# Patient Record
Sex: FEMALE | Race: WHITE | Employment: OTHER | URBAN - METROPOLITAN AREA
[De-identification: names, ages, dates, MRNs, and addresses within clinical notes are randomized per-mention and may not be internally consistent; named-entity substitution may affect disease eponyms.]

---

## 2018-04-11 RX ORDER — LATANOPROST 50 UG/ML
1 SOLUTION/ DROPS OPHTHALMIC NIGHTLY
COMMUNITY
End: 2020-02-25 | Stop reason: CLARIF

## 2018-04-17 ENCOUNTER — APPOINTMENT (OUTPATIENT)
Dept: GENERAL RADIOLOGY | Age: 83
End: 2018-04-17
Attending: UROLOGY
Payer: MEDICARE

## 2018-04-17 ENCOUNTER — ANESTHESIA EVENT (OUTPATIENT)
Dept: OPERATING ROOM | Age: 83
End: 2018-04-17
Payer: MEDICARE

## 2018-04-17 ENCOUNTER — HOSPITAL ENCOUNTER (OUTPATIENT)
Age: 83
Setting detail: OUTPATIENT SURGERY
Discharge: HOME | End: 2018-04-17
Attending: UROLOGY | Admitting: UROLOGY
Payer: MEDICARE

## 2018-04-17 ENCOUNTER — ANESTHESIA (OUTPATIENT)
Dept: OPERATING ROOM | Age: 83
End: 2018-04-17
Payer: MEDICARE

## 2018-04-17 VITALS
TEMPERATURE: 98.1 F | SYSTOLIC BLOOD PRESSURE: 148 MMHG | HEIGHT: 62 IN | WEIGHT: 135 LBS | DIASTOLIC BLOOD PRESSURE: 70 MMHG | OXYGEN SATURATION: 98 % | HEART RATE: 60 BPM | RESPIRATION RATE: 17 BRPM | BODY MASS INDEX: 24.84 KG/M2

## 2018-04-17 VITALS
SYSTOLIC BLOOD PRESSURE: 127 MMHG | OXYGEN SATURATION: 98 % | DIASTOLIC BLOOD PRESSURE: 66 MMHG | RESPIRATION RATE: 17 BRPM

## 2018-04-17 DIAGNOSIS — C66.2 URETERAL CARCINOMA, LEFT (HCC): Primary | ICD-10-CM

## 2018-04-17 PROCEDURE — C2617 STENT, NON-COR, TEM W/O DEL: HCPCS | Performed by: UROLOGY

## 2018-04-17 PROCEDURE — 76000 FLUOROSCOPY <1 HR PHYS/QHP: CPT

## 2018-04-17 PROCEDURE — 6360000004 HC RX CONTRAST MEDICATION: Performed by: UROLOGY

## 2018-04-17 PROCEDURE — C1758 CATHETER, URETERAL: HCPCS | Performed by: UROLOGY

## 2018-04-17 PROCEDURE — 3700000000 HC ANESTHESIA ATTENDED CARE: Performed by: UROLOGY

## 2018-04-17 PROCEDURE — A9577 INJ MULTIHANCE: HCPCS | Performed by: UROLOGY

## 2018-04-17 PROCEDURE — 74420 UROGRAPHY RTRGR +-KUB: CPT

## 2018-04-17 PROCEDURE — 3600000013 HC SURGERY LEVEL 3 ADDTL 15MIN: Performed by: UROLOGY

## 2018-04-17 PROCEDURE — 6360000002 HC RX W HCPCS: Performed by: UROLOGY

## 2018-04-17 PROCEDURE — 3600000003 HC SURGERY LEVEL 3 BASE: Performed by: UROLOGY

## 2018-04-17 PROCEDURE — 3700000001 HC ADD 15 MINUTES (ANESTHESIA): Performed by: UROLOGY

## 2018-04-17 PROCEDURE — 6360000002 HC RX W HCPCS: Performed by: NURSE ANESTHETIST, CERTIFIED REGISTERED

## 2018-04-17 PROCEDURE — C1894 INTRO/SHEATH, NON-LASER: HCPCS | Performed by: UROLOGY

## 2018-04-17 PROCEDURE — 2580000003 HC RX 258: Performed by: UROLOGY

## 2018-04-17 PROCEDURE — 7100000010 HC PHASE II RECOVERY - FIRST 15 MIN: Performed by: UROLOGY

## 2018-04-17 PROCEDURE — 88112 CYTOPATH CELL ENHANCE TECH: CPT

## 2018-04-17 PROCEDURE — 2500000003 HC RX 250 WO HCPCS: Performed by: NURSE ANESTHETIST, CERTIFIED REGISTERED

## 2018-04-17 PROCEDURE — 7100000011 HC PHASE II RECOVERY - ADDTL 15 MIN: Performed by: UROLOGY

## 2018-04-17 DEVICE — URETERAL STENT
Type: IMPLANTABLE DEVICE | Status: FUNCTIONAL
Brand: PERCUFLEX™

## 2018-04-17 RX ORDER — DEXAMETHASONE SODIUM PHOSPHATE 10 MG/ML
INJECTION, SOLUTION INTRAMUSCULAR; INTRAVENOUS PRN
Status: DISCONTINUED | OUTPATIENT
Start: 2018-04-17 | End: 2018-04-17 | Stop reason: SDUPTHER

## 2018-04-17 RX ORDER — SODIUM CHLORIDE 9 MG/ML
INJECTION, SOLUTION INTRAVENOUS CONTINUOUS
Status: DISCONTINUED | OUTPATIENT
Start: 2018-04-17 | End: 2018-04-17 | Stop reason: HOSPADM

## 2018-04-17 RX ORDER — SODIUM CHLORIDE 0.9 % (FLUSH) 0.9 %
10 SYRINGE (ML) INJECTION PRN
Status: DISCONTINUED | OUTPATIENT
Start: 2018-04-17 | End: 2018-04-17 | Stop reason: HOSPADM

## 2018-04-17 RX ORDER — HYDROMORPHONE HCL 110MG/55ML
0.5 PATIENT CONTROLLED ANALGESIA SYRINGE INTRAVENOUS EVERY 4 HOURS PRN
Status: DISCONTINUED | OUTPATIENT
Start: 2018-04-17 | End: 2018-04-17 | Stop reason: HOSPADM

## 2018-04-17 RX ORDER — CIPROFLOXACIN 2 MG/ML
200 INJECTION, SOLUTION INTRAVENOUS
Status: COMPLETED | OUTPATIENT
Start: 2018-04-17 | End: 2018-04-17

## 2018-04-17 RX ORDER — PROPOFOL 10 MG/ML
INJECTION, EMULSION INTRAVENOUS CONTINUOUS PRN
Status: DISCONTINUED | OUTPATIENT
Start: 2018-04-17 | End: 2018-04-17 | Stop reason: SDUPTHER

## 2018-04-17 RX ORDER — PHENAZOPYRIDINE HYDROCHLORIDE 100 MG/1
100 TABLET, FILM COATED ORAL 3 TIMES DAILY PRN
Qty: 30 TABLET | Refills: 1 | Status: SHIPPED | OUTPATIENT
Start: 2018-04-17 | End: 2018-04-20

## 2018-04-17 RX ORDER — CIPROFLOXACIN 250 MG/1
250 TABLET, FILM COATED ORAL 2 TIMES DAILY
Qty: 6 TABLET | Refills: 0 | Status: SHIPPED | OUTPATIENT
Start: 2018-04-17 | End: 2018-04-20

## 2018-04-17 RX ORDER — ONDANSETRON 2 MG/ML
4 INJECTION INTRAMUSCULAR; INTRAVENOUS EVERY 6 HOURS PRN
Status: DISCONTINUED | OUTPATIENT
Start: 2018-04-17 | End: 2018-04-17 | Stop reason: HOSPADM

## 2018-04-17 RX ORDER — SODIUM CHLORIDE 0.9 % (FLUSH) 0.9 %
10 SYRINGE (ML) INJECTION EVERY 12 HOURS SCHEDULED
Status: DISCONTINUED | OUTPATIENT
Start: 2018-04-17 | End: 2018-04-17 | Stop reason: HOSPADM

## 2018-04-17 RX ORDER — ACETAMINOPHEN AND CODEINE PHOSPHATE 300; 30 MG/1; MG/1
1 TABLET ORAL EVERY 4 HOURS PRN
Status: DISCONTINUED | OUTPATIENT
Start: 2018-04-17 | End: 2018-04-17 | Stop reason: HOSPADM

## 2018-04-17 RX ORDER — ONDANSETRON 2 MG/ML
INJECTION INTRAMUSCULAR; INTRAVENOUS PRN
Status: DISCONTINUED | OUTPATIENT
Start: 2018-04-17 | End: 2018-04-17 | Stop reason: SDUPTHER

## 2018-04-17 RX ORDER — PHENAZOPYRIDINE HYDROCHLORIDE 100 MG/1
100 TABLET, FILM COATED ORAL 3 TIMES DAILY PRN
Status: DISCONTINUED | OUTPATIENT
Start: 2018-04-17 | End: 2018-04-17 | Stop reason: HOSPADM

## 2018-04-17 RX ORDER — EPHEDRINE SULFATE/0.9% NACL/PF 50 MG/5 ML
SYRINGE (ML) INTRAVENOUS PRN
Status: DISCONTINUED | OUTPATIENT
Start: 2018-04-17 | End: 2018-04-17 | Stop reason: SDUPTHER

## 2018-04-17 RX ORDER — ACETAMINOPHEN AND CODEINE PHOSPHATE 300; 30 MG/1; MG/1
1-2 TABLET ORAL EVERY 4 HOURS PRN
Qty: 10 TABLET | Refills: 0 | Status: SHIPPED | OUTPATIENT
Start: 2018-04-17 | End: 2018-04-27

## 2018-04-17 RX ORDER — KETAMINE HYDROCHLORIDE 10 MG/ML
INJECTION, SOLUTION INTRAMUSCULAR; INTRAVENOUS PRN
Status: DISCONTINUED | OUTPATIENT
Start: 2018-04-17 | End: 2018-04-17 | Stop reason: SDUPTHER

## 2018-04-17 RX ADMIN — KETAMINE HYDROCHLORIDE 20 MG: 10 INJECTION, SOLUTION INTRAMUSCULAR; INTRAVENOUS at 11:02

## 2018-04-17 RX ADMIN — SODIUM CHLORIDE: 9 INJECTION, SOLUTION INTRAVENOUS at 08:11

## 2018-04-17 RX ADMIN — Medication 5 MG: at 11:06

## 2018-04-17 RX ADMIN — CIPROFLOXACIN 200 MG: 2 INJECTION, SOLUTION INTRAVENOUS at 09:51

## 2018-04-17 RX ADMIN — ONDANSETRON 4 MG: 2 INJECTION INTRAMUSCULAR; INTRAVENOUS at 10:57

## 2018-04-17 RX ADMIN — PROPOFOL 75 MCG/KG/MIN: 10 INJECTION, EMULSION INTRAVENOUS at 10:46

## 2018-04-17 RX ADMIN — DEXAMETHASONE SODIUM PHOSPHATE 10 MG: 10 INJECTION, SOLUTION INTRAMUSCULAR; INTRAVENOUS at 10:56

## 2018-04-17 ASSESSMENT — PULMONARY FUNCTION TESTS
PIF_VALUE: 0
PIF_VALUE: 1
PIF_VALUE: 0

## 2018-04-17 ASSESSMENT — PAIN - FUNCTIONAL ASSESSMENT: PAIN_FUNCTIONAL_ASSESSMENT: 0-10

## 2018-04-17 ASSESSMENT — PAIN SCALES - GENERAL
PAINLEVEL_OUTOF10: 0

## 2019-04-01 ENCOUNTER — TELEPHONE (OUTPATIENT)
Dept: ORTHOPEDIC SURGERY | Age: 84
End: 2019-04-01

## 2019-04-01 NOTE — TELEPHONE ENCOUNTER
Pam's daughter Juan Plater calling to schedule appointment, stating Sofia Nicholas is Dr. Marj Taylor aunt and she had a tear in her knee that he treated previously. Call transferred to [facility].

## 2019-04-04 ENCOUNTER — OFFICE VISIT (OUTPATIENT)
Dept: ORTHOPEDIC SURGERY | Age: 84
End: 2019-04-04
Payer: MEDICARE

## 2019-04-04 ENCOUNTER — HOSPITAL ENCOUNTER (OUTPATIENT)
Dept: GENERAL RADIOLOGY | Age: 84
Discharge: HOME | End: 2019-04-06
Payer: MEDICARE

## 2019-04-04 VITALS
HEIGHT: 63 IN | BODY MASS INDEX: 24.63 KG/M2 | HEART RATE: 57 BPM | SYSTOLIC BLOOD PRESSURE: 184 MMHG | WEIGHT: 139 LBS | DIASTOLIC BLOOD PRESSURE: 77 MMHG

## 2019-04-04 DIAGNOSIS — M17.11 LOCALIZED OSTEOARTHRITIS OF RIGHT KNEE: ICD-10-CM

## 2019-04-04 DIAGNOSIS — M25.561 CHRONIC PAIN OF RIGHT KNEE: Primary | ICD-10-CM

## 2019-04-04 DIAGNOSIS — M25.561 CHRONIC PAIN OF RIGHT KNEE: ICD-10-CM

## 2019-04-04 DIAGNOSIS — G89.29 CHRONIC PAIN OF RIGHT KNEE: ICD-10-CM

## 2019-04-04 DIAGNOSIS — G89.29 CHRONIC PAIN OF RIGHT KNEE: Primary | ICD-10-CM

## 2019-04-04 PROCEDURE — 73560 X-RAY EXAM OF KNEE 1 OR 2: CPT

## 2019-04-04 PROCEDURE — 99202 OFFICE O/P NEW SF 15 MIN: CPT | Performed by: ORTHOPAEDIC SURGERY

## 2019-04-04 PROCEDURE — 20610 DRAIN/INJ JOINT/BURSA W/O US: CPT | Performed by: ORTHOPAEDIC SURGERY

## 2019-04-04 PROCEDURE — 99203 OFFICE O/P NEW LOW 30 MIN: CPT | Performed by: ORTHOPAEDIC SURGERY

## 2019-04-04 PROCEDURE — 2500000003 HC RX 250 WO HCPCS

## 2019-04-04 PROCEDURE — 6360000002 HC RX W HCPCS

## 2019-04-04 RX ORDER — LIDOCAINE HYDROCHLORIDE 10 MG/ML
4 INJECTION, SOLUTION INFILTRATION; PERINEURAL ONCE
Status: COMPLETED | OUTPATIENT
Start: 2019-04-04 | End: 2019-04-04

## 2019-04-04 RX ORDER — BUPIVACAINE HYDROCHLORIDE 2.5 MG/ML
4 INJECTION, SOLUTION INFILTRATION; PERINEURAL ONCE
Status: COMPLETED | OUTPATIENT
Start: 2019-04-04 | End: 2019-04-04

## 2019-04-04 RX ORDER — TRIAMCINOLONE ACETONIDE 40 MG/ML
40 INJECTION, SUSPENSION INTRA-ARTICULAR; INTRAMUSCULAR ONCE
Status: COMPLETED | OUTPATIENT
Start: 2019-04-04 | End: 2019-04-04

## 2019-04-04 RX ADMIN — BUPIVACAINE HYDROCHLORIDE 10 MG: 2.5 INJECTION, SOLUTION INFILTRATION; PERINEURAL at 15:02

## 2019-04-04 RX ADMIN — TRIAMCINOLONE ACETONIDE 40 MG: 40 INJECTION, SUSPENSION INTRA-ARTICULAR; INTRAMUSCULAR at 15:04

## 2019-04-04 RX ADMIN — LIDOCAINE HYDROCHLORIDE 4 ML: 10 INJECTION, SOLUTION INFILTRATION; PERINEURAL at 15:03

## 2019-04-09 PROBLEM — M17.11 LOCALIZED OSTEOARTHRITIS OF RIGHT KNEE: Status: ACTIVE | Noted: 2019-04-09

## 2019-04-09 RX ORDER — TRIAMCINOLONE ACETONIDE 40 MG/ML
40 INJECTION, SUSPENSION INTRA-ARTICULAR; INTRAMUSCULAR ONCE
Status: DISCONTINUED | OUTPATIENT
Start: 2019-04-09 | End: 2019-10-31 | Stop reason: HOSPADM

## 2019-04-09 RX ORDER — BUPIVACAINE HYDROCHLORIDE 2.5 MG/ML
3 INJECTION, SOLUTION EPIDURAL; INFILTRATION; INTRACAUDAL ONCE
Status: DISCONTINUED | OUTPATIENT
Start: 2019-04-09 | End: 2019-10-31 | Stop reason: HOSPADM

## 2019-04-09 RX ORDER — LIDOCAINE HYDROCHLORIDE 10 MG/ML
3 INJECTION, SOLUTION INFILTRATION; PERINEURAL ONCE
Status: DISCONTINUED | OUTPATIENT
Start: 2019-04-09 | End: 2019-10-31 | Stop reason: HOSPADM

## 2019-04-09 NOTE — PROGRESS NOTES
Orthopaedic New Patient Note        Kiki Moctezuma is a 80 y.o. female, her YOB: 1928 with the following history as recorded in [facility]:    Patient Active Problem List    Diagnosis Date Noted    Localized osteoarthritis of right knee 04/09/2019    Gastritis 01/30/2015    Gastric mass 01/30/2015    Abdominal pain 01/29/2015    Hematemesis 01/29/2015    Hyperlipidemia     Hypertension     Glaucoma      Current Outpatient Medications   Medication Sig Dispense Refill    latanoprost (XALATAN) 0.005 % ophthalmic solution Place 1 drop into both eyes nightly      Naproxen Sodium (ALEVE) 220 MG CAPS Take 1 capsule by mouth daily as needed for Pain HOLD FOR PROCEDURE      timolol (BETIMOL) 0.25 % ophthalmic solution Place 1-2 drops into both eyes daily. Take 1 drop in the morning in both eyes      valsartan (DIOVAN) 320 MG tablet Take 320 mg by mouth every morning       simvastatin (ZOCOR) 10 MG tablet Take 20 mg by mouth nightly.  amLODIPine (NORVASC) 2.5 MG tablet Take 2.5 mg by mouth every morning       citalopram (CELEXA) 10 MG tablet Take 10 mg by mouth every morning        No current facility-administered medications for this visit. Allergies: Penicillins and Sulfa antibiotics  Past Medical History:   Diagnosis Date    Glaucoma     Hyperlipidemia     Hypertension      Past Surgical History:   Procedure Laterality Date    CYSTOSCOPY  04/04/2017    cysto retro left ureteroscopy, stent removal with Dr. Ed Johnson  07/18/2017    stent placement    CYSTOSCOPY  10/19/2017    Left stent placement    HYSTERECTOMY      MS CYSTOURETHROSCOPY,URETER CATHETER Left 4/17/2018    CYSTOSCOPY RETROGRADE PYELOGRAM BLADDER AND LEFT URETERAL WASHINGS INSERTION LEFT URETERAL STENT LEFT URETEROSCOPY performed by Brigette Peck MD at [facility] ENDOSCOPY  1/29/15     History reviewed. No pertinent family history.   Social History     Tobacco non-distended, non-tender and no rebound tenderness or guarding  NEUROLOGIC: Awake, alert, oriented to name, place and time. Cranial nerves II-XII are grossly intact. Motor is 5 out of 5 bilaterally. Sensory is intact. Right Lower Extremity Exam:  Skin intact, mild knee effusion  No pain with SLR or logroll  Tender to palpation over MJL. Demonstrates active knee flexion/extension, ankle plantar/dorsiflexion/great toe extension. States sensation intact to gross touch in sural/deep peroneal/superficial peroneal/saphenous/posterior tibial nerve distributions to foot/ankle. Active range of motion of knee 5 - 100  Stable V/V, L/PD  Palpable dorsalis pedis and posterior tibialis pulses, cap refill brisk in toes, foot warm/perfused. Compartments supple throughout thigh and leg, calves supple/NT    Xrays Reviewed  Right knee - mild to moderate degenerative changes seen most notable medial and PF compartmets, no acute fractures, osseous alignments maintained, mild osteopenia    ASSESSMENT:    ICD-10-CM    1. Chronic pain of right knee M25.561 XR KNEE RIGHT (1-2 VIEWS)    G89.29 bupivacaine (MARCAINE) 0.25 % injection 10 mg     lidocaine 1 % injection 4 mL     triamcinolone acetonide (KENALOG-40) injection 40 mg   2. Localized osteoarthritis of right knee M17.11        PLAN:   Had lengthy discussion with patient and family regarding their diagnosis, typical prognosis, and expected outcomes. We also discussed treatment options including treatment algorithm for knee OA  They have elected for Right knee CSI today  Can f/u PRN    PROCEDURE:  Right knee corticosteroid injection  With the patient's permission, her right knee was injected in standard sterile fashion with a mixture of (3ml of 1% lidocaine, 3ml of 0.25% bupivacain, 1ml of kenalog). Through the typical anteromedial portal site of the knee, the knee was injected without difficulty. The patient tolerated this well. A band-aid was applied.      Electronically Signed By  Charlie Kinsey DO  4/4/19    NOTE: This report was transcribed using voice recognition software.  Every effort was made to ensure accuracy; however, inadvertent computerized transcription errors may be present

## 2019-07-15 ENCOUNTER — TELEPHONE (OUTPATIENT)
Dept: PRIMARY CARE CLINIC | Age: 84
End: 2019-07-15

## 2019-07-16 ENCOUNTER — TELEPHONE (OUTPATIENT)
Dept: PRIMARY CARE CLINIC | Age: 84
End: 2019-07-16

## 2019-07-31 ENCOUNTER — TELEPHONE (OUTPATIENT)
Dept: PEDIATRICS CLINIC | Age: 84
End: 2019-07-31

## 2019-07-31 DIAGNOSIS — E11.9 DIET-CONTROLLED DIABETES MELLITUS (HCC): ICD-10-CM

## 2019-07-31 DIAGNOSIS — E03.9 ACQUIRED HYPOTHYROIDISM: Primary | ICD-10-CM

## 2019-07-31 DIAGNOSIS — E78.2 MIXED HYPERLIPIDEMIA: ICD-10-CM

## 2019-08-12 ENCOUNTER — HOSPITAL ENCOUNTER (OUTPATIENT)
Age: 84
Discharge: HOME | End: 2019-08-14
Payer: MEDICARE

## 2019-08-12 DIAGNOSIS — E11.9 DIET-CONTROLLED DIABETES MELLITUS (HCC): ICD-10-CM

## 2019-08-12 DIAGNOSIS — E78.2 MIXED HYPERLIPIDEMIA: ICD-10-CM

## 2019-08-12 DIAGNOSIS — E03.9 ACQUIRED HYPOTHYROIDISM: ICD-10-CM

## 2019-08-12 LAB
ALBUMIN SERPL-MCNC: 4.3 G/DL (ref 3.5–5.2)
ALP BLD-CCNC: 86 U/L (ref 35–104)
ALT SERPL-CCNC: 13 U/L (ref 0–32)
ANION GAP SERPL CALCULATED.3IONS-SCNC: 14 MMOL/L (ref 7–16)
AST SERPL-CCNC: 20 U/L (ref 0–31)
BASOPHILS ABSOLUTE: 0.05 E9/L (ref 0–0.2)
BASOPHILS RELATIVE PERCENT: 1 % (ref 0–2)
BILIRUB SERPL-MCNC: 0.5 MG/DL (ref 0–1.2)
BUN BLDV-MCNC: 24 MG/DL (ref 8–23)
CALCIUM SERPL-MCNC: 9.7 MG/DL (ref 8.6–10.2)
CHLORIDE BLD-SCNC: 106 MMOL/L (ref 98–107)
CHOLESTEROL, TOTAL: 239 MG/DL (ref 0–199)
CO2: 21 MMOL/L (ref 22–29)
CREAT SERPL-MCNC: 0.9 MG/DL (ref 0.5–1)
EOSINOPHILS ABSOLUTE: 0.11 E9/L (ref 0.05–0.5)
EOSINOPHILS RELATIVE PERCENT: 2.2 % (ref 0–6)
GFR AFRICAN AMERICAN: >60
GFR NON-AFRICAN AMERICAN: 59 ML/MIN/1.73
GLUCOSE BLD-MCNC: 103 MG/DL (ref 74–99)
HBA1C MFR BLD: 5.8 % (ref 4–5.6)
HCT VFR BLD CALC: 45.5 % (ref 34–48)
HDLC SERPL-MCNC: 50 MG/DL
HEMOGLOBIN: 13.9 G/DL (ref 11.5–15.5)
IMMATURE GRANULOCYTES #: 0.01 E9/L
IMMATURE GRANULOCYTES %: 0.2 % (ref 0–5)
LDL CHOLESTEROL CALCULATED: 150 MG/DL (ref 0–99)
LYMPHOCYTES ABSOLUTE: 1.16 E9/L (ref 1.5–4)
LYMPHOCYTES RELATIVE PERCENT: 23.7 % (ref 20–42)
MCH RBC QN AUTO: 30.3 PG (ref 26–35)
MCHC RBC AUTO-ENTMCNC: 30.5 % (ref 32–34.5)
MCV RBC AUTO: 99.1 FL (ref 80–99.9)
MONOCYTES ABSOLUTE: 0.4 E9/L (ref 0.1–0.95)
MONOCYTES RELATIVE PERCENT: 8.2 % (ref 2–12)
NEUTROPHILS ABSOLUTE: 3.16 E9/L (ref 1.8–7.3)
NEUTROPHILS RELATIVE PERCENT: 64.7 % (ref 43–80)
PDW BLD-RTO: 13.3 FL (ref 11.5–15)
PLATELET # BLD: 167 E9/L (ref 130–450)
PMV BLD AUTO: 10.7 FL (ref 7–12)
POTASSIUM SERPL-SCNC: 4.7 MMOL/L (ref 3.5–5)
RBC # BLD: 4.59 E12/L (ref 3.5–5.5)
SODIUM BLD-SCNC: 141 MMOL/L (ref 132–146)
T4 TOTAL: 5.8 MCG/DL (ref 4.5–11.7)
TOTAL PROTEIN: 7.3 G/DL (ref 6.4–8.3)
TRIGL SERPL-MCNC: 197 MG/DL (ref 0–149)
TSH SERPL DL<=0.05 MIU/L-ACNC: 2.5 UIU/ML (ref 0.27–4.2)
VLDLC SERPL CALC-MCNC: 39 MG/DL
WBC # BLD: 4.9 E9/L (ref 4.5–11.5)

## 2019-08-12 PROCEDURE — 84443 ASSAY THYROID STIM HORMONE: CPT

## 2019-08-12 PROCEDURE — 85025 COMPLETE CBC W/AUTO DIFF WBC: CPT

## 2019-08-12 PROCEDURE — 80053 COMPREHEN METABOLIC PANEL: CPT

## 2019-08-12 PROCEDURE — 80061 LIPID PANEL: CPT

## 2019-08-12 PROCEDURE — 84436 ASSAY OF TOTAL THYROXINE: CPT

## 2019-08-12 PROCEDURE — 36415 COLL VENOUS BLD VENIPUNCTURE: CPT

## 2019-08-12 PROCEDURE — 83036 HEMOGLOBIN GLYCOSYLATED A1C: CPT

## 2019-08-20 ENCOUNTER — OFFICE VISIT (OUTPATIENT)
Dept: PRIMARY CARE CLINIC | Age: 84
End: 2019-08-20
Payer: MEDICARE

## 2019-08-20 VITALS
TEMPERATURE: 97.8 F | WEIGHT: 140.8 LBS | BODY MASS INDEX: 27.64 KG/M2 | SYSTOLIC BLOOD PRESSURE: 128 MMHG | HEIGHT: 60 IN | DIASTOLIC BLOOD PRESSURE: 82 MMHG

## 2019-08-20 DIAGNOSIS — F41.9 ANXIETY: ICD-10-CM

## 2019-08-20 DIAGNOSIS — I10 ESSENTIAL HYPERTENSION: Primary | ICD-10-CM

## 2019-08-20 DIAGNOSIS — E03.9 ACQUIRED HYPOTHYROIDISM: ICD-10-CM

## 2019-08-20 DIAGNOSIS — M81.0 AGE-RELATED OSTEOPOROSIS WITHOUT CURRENT PATHOLOGICAL FRACTURE: ICD-10-CM

## 2019-08-20 DIAGNOSIS — K21.9 GASTROESOPHAGEAL REFLUX DISEASE WITHOUT ESOPHAGITIS: ICD-10-CM

## 2019-08-20 DIAGNOSIS — E78.2 MIXED HYPERLIPIDEMIA: ICD-10-CM

## 2019-08-20 DIAGNOSIS — E11.9 DIET-CONTROLLED DIABETES MELLITUS (HCC): ICD-10-CM

## 2019-08-20 PROCEDURE — 99214 OFFICE O/P EST MOD 30 MIN: CPT | Performed by: FAMILY MEDICINE

## 2019-08-20 RX ORDER — CITALOPRAM 10 MG/1
10 TABLET ORAL EVERY MORNING
Qty: 90 TABLET | Refills: 12 | Status: SHIPPED
Start: 2019-08-20 | End: 2020-08-19 | Stop reason: SDUPTHER

## 2019-08-20 RX ORDER — SIMVASTATIN 20 MG
20 TABLET ORAL NIGHTLY
Qty: 90 TABLET | Refills: 5 | Status: SHIPPED
Start: 2019-08-20 | End: 2020-10-26 | Stop reason: SDUPTHER

## 2019-08-20 RX ORDER — RANITIDINE 150 MG/1
150 TABLET ORAL 2 TIMES DAILY
Qty: 180 TABLET | Refills: 5 | Status: SHIPPED | OUTPATIENT
Start: 2019-08-20 | End: 2019-10-29 | Stop reason: ALTCHOICE

## 2019-08-20 RX ORDER — LOSARTAN POTASSIUM 100 MG/1
100 TABLET ORAL DAILY
Qty: 90 TABLET | Refills: 5 | Status: SHIPPED
Start: 2019-08-20 | End: 2020-10-16 | Stop reason: SDUPTHER

## 2019-08-20 RX ORDER — AMLODIPINE BESYLATE 2.5 MG/1
2.5 TABLET ORAL EVERY MORNING
Qty: 90 TABLET | Refills: 12 | Status: SHIPPED | OUTPATIENT
Start: 2019-08-20 | End: 2019-11-11 | Stop reason: SDUPTHER

## 2019-08-20 ASSESSMENT — ENCOUNTER SYMPTOMS
ALLERGIC/IMMUNOLOGIC NEGATIVE: 1
EYES NEGATIVE: 1
RESPIRATORY NEGATIVE: 1
GASTROINTESTINAL NEGATIVE: 1

## 2019-08-20 NOTE — PROGRESS NOTES
Medical: Not on file     Non-medical: Not on file   Tobacco Use    Smoking status: Never Smoker    Smokeless tobacco: Never Used   Substance and Sexual Activity    Alcohol use: No    Drug use: No    Sexual activity: Never   Lifestyle    Physical activity:     Days per week: Not on file     Minutes per session: Not on file    Stress: Not on file   Relationships    Social connections:     Talks on phone: Not on file     Gets together: Not on file     Attends Quaker service: Not on file     Active member of club or organization: Not on file     Attends meetings of clubs or organizations: Not on file     Relationship status: Not on file    Intimate partner violence:     Fear of current or ex partner: Not on file     Emotionally abused: Not on file     Physically abused: Not on file     Forced sexual activity: Not on file   Other Topics Concern    Not on file   Social History Narrative    [id number] : 1928 Sex: F Age: 80 years    Subjective    CC: HERE FOR 6 MO CK FOR BP    HPI: HERE WITH DAUGHTER STIL ACITVE--- LAB NTOED MISSES MEDS A LOT----LAB OK D    LOW    ROS:    Const: DENIES SYMPTOMS OTHER THAN STATED ABOVE    Eyes: DENIES SUDDEN CHANGE OF VISION    ENMT: DENIES PURULENT DISCHARGE AND PAINFUL LESIONS    CV: DENIES SQUEEZING CHEST PAIN, MASSIVE EDEMA OR CONSTANT PALPITAIONS    Resp: DENIES HEMOPTYSIS    GI: DENIES CONSTANT ABDOMINAL PAIN OR GROSSLY BLOODY DISCHARGBE    : DENIES DYSURIA OR HEMATURIA    Musculo: DENIES JOINT PAIN AND WEAKNESS    Skin: DENIES PURULENT DRAINING RASH    Neuro: DENIES INTRACTABLE HEADACHE, SEIZURE AND SYNCOPE    Psych: DENIES CONSTANT ANXIETY, SEVERE DEPRESSION AND RECURRENT PANIC    Endocrine: DENIES EXTREME SHIFTS IN GLUCOSE AND SEVERE THYROID SYMPTOMS    Hema/Lymph: DENIES GROSS BLEEDING OR CLOTTING SYMPTOMS    Current Meds:Handicap Placard dx OA 5 yrs    Losartan Potassium 100 mg 1 by mouth every day replaces valsartan    Ranitidine 150 Maximum Strength 150 mg 1 by mouth twice a day    Amlodipine Besylate 2.5 mg one daily for bp---morning    Citalopram Hydrobromide 10 mg 1 po qam --for stress    Timolol Maleate 0.5 % 1 gtt ou bid    Zocor 20 mg generic ok - 1 po qd--hs no food    Allergies: Sulfa - rash, Enalapril Maleate    PMH:    Shot Record:    Roceph-Rochepin 1GM-NDC#[id number]    Ceft1gm-Ceftriaxone Sodium 1GM Injection 16. Chronic Diagnosis: Degenerative joint disease involving multiple joints, Type II diabetes mellitus    uncontrolled, Essential hypertension, Mixed hyperlipidemia. Health Maintenance:    Couseled on Home Safety - (2017)    Stress Test - 09 neg. (Jaimes)    Duplex Carotid Ultasound - 09 no evidence of significant stenosis    HTN    LIPID    GLAUCOMA    C HYSTER    CVA--    OA L KNEE    DIET DM    SON SHAHZAD HINES AND WILL LIVE WITH HER    Pam Luis  1928 Page #2    LOW VIT B12 AND VIT D 4-10    DAUGHTER WORKS AT DR [employer] OFFICE    GYN DR Estrella Haji    US KIDNEY WITH RIGHT RENAL CYST AND SEVERAL RIGHT RENAL STONES    ADMIT WITH GI BLEED----AND EGD WITH ESOPHAGITIS AND POSS MASS BUT BX NEG---    2-15----DR HORN------RE DONE DR WALLER 5-15    ER WITH FALL WITH 20 % COMP FX L-1 AND CT ABD IWTH LEFT HYDRONEPHROSIS AND    URETER WITH NO STONE------GB STONE OR SLUDGE NOTED    CT  WITH DISTAL LEFT URETER MASS---DX WITH URETER CA---- LEFT----DR MAGGI FITZPATRICK-----STENT    SON DX WITH BLADDER CA -    Reviewed, no changes.     Objective    BP: 128/82 T: 98.2 Ht: 60\" 5'0\" Ht cm: 152.4 Wt: 141lb Wt Prior: 137lb as of 18 Wt Dif: +4lb Wt    k.958 Wt kg Prior: 62.143 as of 18 Wt kg Dif: +1.815 BMI: 27.5 BSA: 1.61    Exam:    Const: ALERT AND ORIENTED X 3    Eyes: EOMI, CLARE    ENMT: CLEAR WITH NO ERYTHEMA    Neck: SUPPLE AND SYMMETRIC WITH NO MASSES OR JVD    Resp: NO RALES, RONCHI OR WHEEZING    CV:R R R WITH NO

## 2019-10-10 ENCOUNTER — TELEPHONE (OUTPATIENT)
Dept: PRIMARY CARE CLINIC | Age: 84
End: 2019-10-10

## 2019-10-22 ENCOUNTER — TELEPHONE (OUTPATIENT)
Dept: PRIMARY CARE CLINIC | Age: 84
End: 2019-10-22

## 2019-10-22 DIAGNOSIS — R26.9 GAIT ABNORMALITY: ICD-10-CM

## 2019-10-22 DIAGNOSIS — M51.9 INTERVERTEBRAL LUMBAR DISC DISORDER: Primary | ICD-10-CM

## 2019-10-29 ENCOUNTER — HOSPITAL ENCOUNTER (OUTPATIENT)
Age: 84
Discharge: HOME | End: 2019-10-31
Payer: MEDICARE

## 2019-10-29 ENCOUNTER — OFFICE VISIT (OUTPATIENT)
Dept: FAMILY MEDICINE CLINIC | Age: 84
End: 2019-10-29
Payer: MEDICARE

## 2019-10-29 ENCOUNTER — APPOINTMENT (OUTPATIENT)
Dept: ULTRASOUND IMAGING | Age: 84
DRG: 446 | End: 2019-10-29
Payer: MEDICARE

## 2019-10-29 ENCOUNTER — HOSPITAL ENCOUNTER (INPATIENT)
Age: 84
LOS: 2 days | Discharge: HOME | DRG: 446 | End: 2019-10-31
Attending: EMERGENCY MEDICINE | Admitting: INTERNAL MEDICINE
Payer: MEDICARE

## 2019-10-29 VITALS
RESPIRATION RATE: 20 BRPM | BODY MASS INDEX: 24.17 KG/M2 | WEIGHT: 141.6 LBS | SYSTOLIC BLOOD PRESSURE: 122 MMHG | HEIGHT: 64 IN | OXYGEN SATURATION: 99 % | HEART RATE: 79 BPM | TEMPERATURE: 98.6 F | DIASTOLIC BLOOD PRESSURE: 76 MMHG

## 2019-10-29 DIAGNOSIS — R10.9 RIGHT FLANK PAIN: ICD-10-CM

## 2019-10-29 DIAGNOSIS — R11.0 NAUSEA: ICD-10-CM

## 2019-10-29 DIAGNOSIS — K81.0 ACUTE CHOLECYSTITIS: Primary | ICD-10-CM

## 2019-10-29 DIAGNOSIS — R10.11 RUQ PAIN: ICD-10-CM

## 2019-10-29 DIAGNOSIS — R10.9 RIGHT FLANK PAIN: Primary | ICD-10-CM

## 2019-10-29 LAB
ALBUMIN SERPL-MCNC: 4.7 G/DL (ref 3.5–5.2)
ALP BLD-CCNC: 92 U/L (ref 35–104)
ALT SERPL-CCNC: 12 U/L (ref 0–32)
ANION GAP SERPL CALCULATED.3IONS-SCNC: 13 MMOL/L (ref 7–16)
AST SERPL-CCNC: 19 U/L (ref 0–31)
BACTERIA: ABNORMAL /HPF
BASOPHILS ABSOLUTE: 0.02 E9/L (ref 0–0.2)
BASOPHILS RELATIVE PERCENT: 0.1 % (ref 0–2)
BILIRUB SERPL-MCNC: 0.5 MG/DL (ref 0–1.2)
BILIRUBIN DIRECT: <0.2 MG/DL (ref 0–0.3)
BILIRUBIN URINE: NEGATIVE
BILIRUBIN, INDIRECT: NORMAL MG/DL (ref 0–1)
BILIRUBIN, POC: NEGATIVE
BLOOD URINE, POC: NORMAL
BLOOD, URINE: ABNORMAL
BUN BLDV-MCNC: 15 MG/DL (ref 8–23)
CALCIUM SERPL-MCNC: 9.7 MG/DL (ref 8.6–10.2)
CHLORIDE BLD-SCNC: 97 MMOL/L (ref 98–107)
CLARITY, POC: NORMAL
CLARITY: ABNORMAL
CO2: 24 MMOL/L (ref 22–29)
COLOR, POC: NORMAL
COLOR: ABNORMAL
CREAT SERPL-MCNC: 0.8 MG/DL (ref 0.5–1)
EKG ATRIAL RATE: 70 BPM
EKG P AXIS: -21 DEGREES
EKG P-R INTERVAL: 138 MS
EKG Q-T INTERVAL: 380 MS
EKG QRS DURATION: 86 MS
EKG QTC CALCULATION (BAZETT): 410 MS
EKG R AXIS: -27 DEGREES
EKG T AXIS: 25 DEGREES
EKG VENTRICULAR RATE: 70 BPM
EOSINOPHILS ABSOLUTE: 0 E9/L (ref 0.05–0.5)
EOSINOPHILS RELATIVE PERCENT: 0 % (ref 0–6)
GFR AFRICAN AMERICAN: >60
GFR NON-AFRICAN AMERICAN: >60 ML/MIN/1.73
GLUCOSE BLD-MCNC: 103 MG/DL (ref 74–99)
GLUCOSE URINE, POC: 250
GLUCOSE URINE: 250 MG/DL
HCT VFR BLD CALC: 45.4 % (ref 34–48)
HEMOGLOBIN: 14.8 G/DL (ref 11.5–15.5)
IMMATURE GRANULOCYTES #: 0.06 E9/L
IMMATURE GRANULOCYTES %: 0.4 % (ref 0–5)
KETONES, POC: NEGATIVE
KETONES, URINE: NEGATIVE MG/DL
LEUKOCYTE EST, POC: NORMAL
LEUKOCYTE ESTERASE, URINE: ABNORMAL
LIPASE: 23 U/L (ref 13–60)
LYMPHOCYTES ABSOLUTE: 1.21 E9/L (ref 1.5–4)
LYMPHOCYTES RELATIVE PERCENT: 8.3 % (ref 20–42)
MCH RBC QN AUTO: 30.5 PG (ref 26–35)
MCHC RBC AUTO-ENTMCNC: 32.6 % (ref 32–34.5)
MCV RBC AUTO: 93.6 FL (ref 80–99.9)
MONOCYTES ABSOLUTE: 0.99 E9/L (ref 0.1–0.95)
MONOCYTES RELATIVE PERCENT: 6.8 % (ref 2–12)
NEUTROPHILS ABSOLUTE: 12.37 E9/L (ref 1.8–7.3)
NEUTROPHILS RELATIVE PERCENT: 84.4 % (ref 43–80)
NITRITE, POC: NEGATIVE
NITRITE, URINE: NEGATIVE
PDW BLD-RTO: 12.7 FL (ref 11.5–15)
PH UA: 6 (ref 5–9)
PH, POC: 6.5
PLATELET # BLD: 164 E9/L (ref 130–450)
PMV BLD AUTO: 10.2 FL (ref 7–12)
POTASSIUM REFLEX MAGNESIUM: 3.8 MMOL/L (ref 3.5–5)
PROTEIN UA: 30 MG/DL
PROTEIN, POC: 100
RBC # BLD: 4.85 E12/L (ref 3.5–5.5)
RBC UA: ABNORMAL /HPF (ref 0–2)
SODIUM BLD-SCNC: 134 MMOL/L (ref 132–146)
SPECIFIC GRAVITY UA: 1.02 (ref 1–1.03)
SPECIFIC GRAVITY, POC: 1.02
TOTAL PROTEIN: 7.8 G/DL (ref 6.4–8.3)
TROPONIN: <0.01 NG/ML (ref 0–0.03)
UROBILINOGEN, POC: 0.2
UROBILINOGEN, URINE: 0.2 E.U./DL
WBC # BLD: 14.7 E9/L (ref 4.5–11.5)
WBC UA: ABNORMAL /HPF (ref 0–5)

## 2019-10-29 PROCEDURE — 80048 BASIC METABOLIC PNL TOTAL CA: CPT

## 2019-10-29 PROCEDURE — 6360000002 HC RX W HCPCS: Performed by: STUDENT IN AN ORGANIZED HEALTH CARE EDUCATION/TRAINING PROGRAM

## 2019-10-29 PROCEDURE — 4040F PNEUMOC VAC/ADMIN/RCVD: CPT | Performed by: PHYSICIAN ASSISTANT

## 2019-10-29 PROCEDURE — 83690 ASSAY OF LIPASE: CPT

## 2019-10-29 PROCEDURE — G8484 FLU IMMUNIZE NO ADMIN: HCPCS | Performed by: PHYSICIAN ASSISTANT

## 2019-10-29 PROCEDURE — 81001 URINALYSIS AUTO W/SCOPE: CPT

## 2019-10-29 PROCEDURE — 1090F PRES/ABSN URINE INCON ASSESS: CPT | Performed by: PHYSICIAN ASSISTANT

## 2019-10-29 PROCEDURE — 99214 OFFICE O/P EST MOD 30 MIN: CPT | Performed by: PHYSICIAN ASSISTANT

## 2019-10-29 PROCEDURE — 2500000003 HC RX 250 WO HCPCS: Performed by: STUDENT IN AN ORGANIZED HEALTH CARE EDUCATION/TRAINING PROGRAM

## 2019-10-29 PROCEDURE — 2580000003 HC RX 258: Performed by: STUDENT IN AN ORGANIZED HEALTH CARE EDUCATION/TRAINING PROGRAM

## 2019-10-29 PROCEDURE — 1123F ACP DISCUSS/DSCN MKR DOCD: CPT | Performed by: PHYSICIAN ASSISTANT

## 2019-10-29 PROCEDURE — 93010 ELECTROCARDIOGRAM REPORT: CPT | Performed by: INTERNAL MEDICINE

## 2019-10-29 PROCEDURE — 6360000002 HC RX W HCPCS: Performed by: INTERNAL MEDICINE

## 2019-10-29 PROCEDURE — 85025 COMPLETE CBC W/AUTO DIFF WBC: CPT

## 2019-10-29 PROCEDURE — 80076 HEPATIC FUNCTION PANEL: CPT

## 2019-10-29 PROCEDURE — 99285 EMERGENCY DEPT VISIT HI MDM: CPT

## 2019-10-29 PROCEDURE — G8420 CALC BMI NORM PARAMETERS: HCPCS | Performed by: PHYSICIAN ASSISTANT

## 2019-10-29 PROCEDURE — 1200000000 HC SEMI PRIVATE

## 2019-10-29 PROCEDURE — 87088 URINE BACTERIA CULTURE: CPT

## 2019-10-29 PROCEDURE — 99223 1ST HOSP IP/OBS HIGH 75: CPT | Performed by: INTERNAL MEDICINE

## 2019-10-29 PROCEDURE — 81002 URINALYSIS NONAUTO W/O SCOPE: CPT | Performed by: PHYSICIAN ASSISTANT

## 2019-10-29 PROCEDURE — G8427 DOCREV CUR MEDS BY ELIG CLIN: HCPCS | Performed by: PHYSICIAN ASSISTANT

## 2019-10-29 PROCEDURE — 93005 ELECTROCARDIOGRAM TRACING: CPT | Performed by: GENERAL PRACTICE

## 2019-10-29 PROCEDURE — 76705 ECHO EXAM OF ABDOMEN: CPT

## 2019-10-29 PROCEDURE — 1036F TOBACCO NON-USER: CPT | Performed by: PHYSICIAN ASSISTANT

## 2019-10-29 PROCEDURE — 84484 ASSAY OF TROPONIN QUANT: CPT

## 2019-10-29 RX ORDER — NETARSUDIL 0.2 MG/ML
1 SOLUTION/ DROPS OPHTHALMIC; TOPICAL DAILY
Status: ON HOLD | COMMUNITY
Start: 2019-10-30 | End: 2021-01-01

## 2019-10-29 RX ORDER — KETOROLAC TROMETHAMINE 30 MG/ML
15 INJECTION, SOLUTION INTRAMUSCULAR; INTRAVENOUS EVERY 6 HOURS PRN
Status: DISCONTINUED | OUTPATIENT
Start: 2019-10-29 | End: 2019-10-31 | Stop reason: HOSPADM

## 2019-10-29 RX ORDER — ONDANSETRON 2 MG/ML
4 INJECTION INTRAMUSCULAR; INTRAVENOUS EVERY 6 HOURS PRN
Status: DISCONTINUED | OUTPATIENT
Start: 2019-10-29 | End: 2019-10-31 | Stop reason: HOSPADM

## 2019-10-29 RX ORDER — SODIUM CHLORIDE 0.9 % (FLUSH) 0.9 %
10 SYRINGE (ML) INJECTION EVERY 12 HOURS SCHEDULED
Status: DISCONTINUED | OUTPATIENT
Start: 2019-10-29 | End: 2019-10-31 | Stop reason: HOSPADM

## 2019-10-29 RX ORDER — ACETAMINOPHEN 325 MG/1
650 TABLET ORAL EVERY 4 HOURS PRN
Status: DISCONTINUED | OUTPATIENT
Start: 2019-10-29 | End: 2019-10-31 | Stop reason: HOSPADM

## 2019-10-29 RX ORDER — SODIUM CHLORIDE 9 MG/ML
INJECTION, SOLUTION INTRAVENOUS CONTINUOUS
Status: DISCONTINUED | OUTPATIENT
Start: 2019-10-29 | End: 2019-10-30

## 2019-10-29 RX ORDER — FAMOTIDINE 10 MG
10 TABLET ORAL PRN
COMMUNITY
End: 2020-02-25 | Stop reason: CLARIF

## 2019-10-29 RX ORDER — SODIUM CHLORIDE 0.9 % (FLUSH) 0.9 %
10 SYRINGE (ML) INJECTION PRN
Status: DISCONTINUED | OUTPATIENT
Start: 2019-10-29 | End: 2019-10-31 | Stop reason: HOSPADM

## 2019-10-29 RX ADMIN — CEFTRIAXONE 2 G: 2 INJECTION, POWDER, FOR SOLUTION INTRAMUSCULAR; INTRAVENOUS at 21:07

## 2019-10-29 RX ADMIN — METRONIDAZOLE 500 MG: 500 INJECTION, SOLUTION INTRAVENOUS at 22:28

## 2019-10-29 RX ADMIN — ENOXAPARIN SODIUM 40 MG: 40 INJECTION SUBCUTANEOUS at 21:10

## 2019-10-29 SDOH — HEALTH STABILITY: MENTAL HEALTH: HOW OFTEN DO YOU HAVE A DRINK CONTAINING ALCOHOL?: NEVER

## 2019-10-29 ASSESSMENT — ENCOUNTER SYMPTOMS
CHOKING: 0
EYE PAIN: 0
VOMITING: 0
SORE THROAT: 0
DIARRHEA: 0
WHEEZING: 0
SHORTNESS OF BREATH: 0
NAUSEA: 1
ABDOMINAL PAIN: 1
SINUS PAIN: 0
COUGH: 0
CHEST TIGHTNESS: 0

## 2019-10-29 ASSESSMENT — PAIN SCALES - GENERAL
PAINLEVEL_OUTOF10: 0
PAINLEVEL_OUTOF10: 7

## 2019-10-29 ASSESSMENT — PAIN DESCRIPTION - FREQUENCY: FREQUENCY: CONTINUOUS

## 2019-10-29 ASSESSMENT — PAIN DESCRIPTION - PAIN TYPE
TYPE: ACUTE PAIN

## 2019-10-29 ASSESSMENT — PAIN DESCRIPTION - LOCATION
LOCATION: ABDOMEN

## 2019-10-29 ASSESSMENT — PAIN DESCRIPTION - DESCRIPTORS: DESCRIPTORS: CRAMPING;THROBBING

## 2019-10-30 LAB
ALBUMIN SERPL-MCNC: 3.8 G/DL (ref 3.5–5.2)
ALP BLD-CCNC: 77 U/L (ref 35–104)
ALT SERPL-CCNC: 10 U/L (ref 0–32)
ANION GAP SERPL CALCULATED.3IONS-SCNC: 13 MMOL/L (ref 7–16)
AST SERPL-CCNC: 16 U/L (ref 0–31)
BASOPHILS ABSOLUTE: 0.02 E9/L (ref 0–0.2)
BASOPHILS RELATIVE PERCENT: 0.2 % (ref 0–2)
BILIRUB SERPL-MCNC: 0.5 MG/DL (ref 0–1.2)
BUN BLDV-MCNC: 14 MG/DL (ref 8–23)
CALCIUM SERPL-MCNC: 8.9 MG/DL (ref 8.6–10.2)
CHLORIDE BLD-SCNC: 102 MMOL/L (ref 98–107)
CO2: 23 MMOL/L (ref 22–29)
CREAT SERPL-MCNC: 0.8 MG/DL (ref 0.5–1)
EOSINOPHILS ABSOLUTE: 0 E9/L (ref 0.05–0.5)
EOSINOPHILS RELATIVE PERCENT: 0 % (ref 0–6)
GFR AFRICAN AMERICAN: >60
GFR NON-AFRICAN AMERICAN: >60 ML/MIN/1.73
GLUCOSE BLD-MCNC: 130 MG/DL (ref 74–99)
HCT VFR BLD CALC: 40 % (ref 34–48)
HEMOGLOBIN: 13.1 G/DL (ref 11.5–15.5)
IMMATURE GRANULOCYTES #: 0.03 E9/L
IMMATURE GRANULOCYTES %: 0.3 % (ref 0–5)
LV EF: 60 %
LVEF MODALITY: NORMAL
LYMPHOCYTES ABSOLUTE: 1.53 E9/L (ref 1.5–4)
LYMPHOCYTES RELATIVE PERCENT: 14.7 % (ref 20–42)
MCH RBC QN AUTO: 30.7 PG (ref 26–35)
MCHC RBC AUTO-ENTMCNC: 32.8 % (ref 32–34.5)
MCV RBC AUTO: 93.7 FL (ref 80–99.9)
MONOCYTES ABSOLUTE: 0.87 E9/L (ref 0.1–0.95)
MONOCYTES RELATIVE PERCENT: 8.3 % (ref 2–12)
NEUTROPHILS ABSOLUTE: 7.99 E9/L (ref 1.8–7.3)
NEUTROPHILS RELATIVE PERCENT: 76.5 % (ref 43–80)
PDW BLD-RTO: 12.9 FL (ref 11.5–15)
PLATELET # BLD: 159 E9/L (ref 130–450)
PMV BLD AUTO: 10.5 FL (ref 7–12)
POTASSIUM REFLEX MAGNESIUM: 3.8 MMOL/L (ref 3.5–5)
POTASSIUM SERPL-SCNC: 3.8 MMOL/L (ref 3.5–5)
RBC # BLD: 4.27 E12/L (ref 3.5–5.5)
SODIUM BLD-SCNC: 138 MMOL/L (ref 132–146)
TOTAL PROTEIN: 6.6 G/DL (ref 6.4–8.3)
WBC # BLD: 10.4 E9/L (ref 4.5–11.5)

## 2019-10-30 PROCEDURE — APPSS60 APP SPLIT SHARED TIME 46-60 MINUTES: Performed by: NURSE PRACTITIONER

## 2019-10-30 PROCEDURE — 80053 COMPREHEN METABOLIC PANEL: CPT

## 2019-10-30 PROCEDURE — 6370000000 HC RX 637 (ALT 250 FOR IP): Performed by: INTERNAL MEDICINE

## 2019-10-30 PROCEDURE — 99232 SBSQ HOSP IP/OBS MODERATE 35: CPT | Performed by: INTERNAL MEDICINE

## 2019-10-30 PROCEDURE — 36415 COLL VENOUS BLD VENIPUNCTURE: CPT

## 2019-10-30 PROCEDURE — 2580000003 HC RX 258: Performed by: INTERNAL MEDICINE

## 2019-10-30 PROCEDURE — 99223 1ST HOSP IP/OBS HIGH 75: CPT | Performed by: INTERNAL MEDICINE

## 2019-10-30 PROCEDURE — 2500000003 HC RX 250 WO HCPCS: Performed by: STUDENT IN AN ORGANIZED HEALTH CARE EDUCATION/TRAINING PROGRAM

## 2019-10-30 PROCEDURE — 93306 TTE W/DOPPLER COMPLETE: CPT

## 2019-10-30 PROCEDURE — 6360000002 HC RX W HCPCS: Performed by: INTERNAL MEDICINE

## 2019-10-30 PROCEDURE — 2580000003 HC RX 258: Performed by: STUDENT IN AN ORGANIZED HEALTH CARE EDUCATION/TRAINING PROGRAM

## 2019-10-30 PROCEDURE — 80048 BASIC METABOLIC PNL TOTAL CA: CPT

## 2019-10-30 PROCEDURE — 1200000000 HC SEMI PRIVATE

## 2019-10-30 PROCEDURE — 6360000002 HC RX W HCPCS: Performed by: STUDENT IN AN ORGANIZED HEALTH CARE EDUCATION/TRAINING PROGRAM

## 2019-10-30 PROCEDURE — 85025 COMPLETE CBC W/AUTO DIFF WBC: CPT

## 2019-10-30 RX ORDER — DORZOLAMIDE HYDROCHLORIDE AND TIMOLOL MALEATE 20; 5 MG/ML; MG/ML
1 SOLUTION/ DROPS OPHTHALMIC 2 TIMES DAILY
Status: ON HOLD | COMMUNITY
End: 2021-01-01

## 2019-10-30 RX ORDER — CEFDINIR 300 MG/1
300 CAPSULE ORAL 2 TIMES DAILY
Qty: 14 CAPSULE | Refills: 0 | Status: SHIPPED | OUTPATIENT
Start: 2019-10-30 | End: 2019-11-06

## 2019-10-30 RX ORDER — ACETAMINOPHEN 160 MG
1 TABLET,DISINTEGRATING ORAL
COMMUNITY
End: 2019-11-22 | Stop reason: SDUPTHER

## 2019-10-30 RX ORDER — CEFDINIR 300 MG/1
300 CAPSULE ORAL EVERY 12 HOURS SCHEDULED
Status: DISCONTINUED | OUTPATIENT
Start: 2019-10-31 | End: 2019-10-31 | Stop reason: HOSPADM

## 2019-10-30 RX ORDER — DORZOLAMIDE HYDROCHLORIDE AND TIMOLOL MALEATE 20; 5 MG/ML; MG/ML
1 SOLUTION/ DROPS OPHTHALMIC 2 TIMES DAILY
Status: DISCONTINUED | OUTPATIENT
Start: 2019-10-30 | End: 2019-10-31 | Stop reason: HOSPADM

## 2019-10-30 RX ORDER — SIMVASTATIN 20 MG
20 TABLET ORAL NIGHTLY
Status: DISCONTINUED | OUTPATIENT
Start: 2019-10-30 | End: 2019-10-31 | Stop reason: HOSPADM

## 2019-10-30 RX ORDER — LATANOPROST 50 UG/ML
1 SOLUTION/ DROPS OPHTHALMIC NIGHTLY
Status: DISCONTINUED | OUTPATIENT
Start: 2019-10-30 | End: 2019-10-30 | Stop reason: ALTCHOICE

## 2019-10-30 RX ORDER — AMLODIPINE BESYLATE 2.5 MG/1
2.5 TABLET ORAL EVERY MORNING
Status: DISCONTINUED | OUTPATIENT
Start: 2019-10-31 | End: 2019-10-31 | Stop reason: HOSPADM

## 2019-10-30 RX ORDER — METRONIDAZOLE 500 MG/1
500 TABLET ORAL EVERY 8 HOURS SCHEDULED
Status: DISCONTINUED | OUTPATIENT
Start: 2019-10-31 | End: 2019-10-31 | Stop reason: HOSPADM

## 2019-10-30 RX ORDER — LOSARTAN POTASSIUM 50 MG/1
100 TABLET ORAL DAILY
Status: DISCONTINUED | OUTPATIENT
Start: 2019-10-30 | End: 2019-10-31 | Stop reason: HOSPADM

## 2019-10-30 RX ORDER — METRONIDAZOLE 500 MG/1
500 TABLET ORAL 2 TIMES DAILY
Qty: 14 TABLET | Refills: 0 | Status: SHIPPED | OUTPATIENT
Start: 2019-10-30 | End: 2019-11-06

## 2019-10-30 RX ORDER — CITALOPRAM 20 MG/1
10 TABLET ORAL EVERY MORNING
Status: DISCONTINUED | OUTPATIENT
Start: 2019-10-31 | End: 2019-10-31 | Stop reason: HOSPADM

## 2019-10-30 RX ORDER — CALCIUM CARBONATE 200(500)MG
1 TABLET,CHEWABLE ORAL DAILY
Status: ON HOLD | COMMUNITY
End: 2020-08-28 | Stop reason: HOSPADM

## 2019-10-30 RX ADMIN — SIMVASTATIN 20 MG: 20 TABLET, FILM COATED ORAL at 20:22

## 2019-10-30 RX ADMIN — ENOXAPARIN SODIUM 40 MG: 40 INJECTION SUBCUTANEOUS at 21:00

## 2019-10-30 RX ADMIN — CEFTRIAXONE 2 G: 2 INJECTION, POWDER, FOR SOLUTION INTRAMUSCULAR; INTRAVENOUS at 18:33

## 2019-10-30 RX ADMIN — Medication 10 ML: at 20:23

## 2019-10-30 RX ADMIN — SODIUM CHLORIDE: 9 INJECTION, SOLUTION INTRAVENOUS at 09:17

## 2019-10-30 RX ADMIN — METRONIDAZOLE 500 MG: 500 INJECTION, SOLUTION INTRAVENOUS at 20:00

## 2019-10-30 RX ADMIN — DORZOLAMIDE HYDROCHLORIDE AND TIMOLOL MALEATE 1 DROP: 20; 5 SOLUTION/ DROPS OPHTHALMIC at 20:25

## 2019-10-30 RX ADMIN — METRONIDAZOLE 500 MG: 500 INJECTION, SOLUTION INTRAVENOUS at 12:01

## 2019-10-30 RX ADMIN — LOSARTAN POTASSIUM 100 MG: 50 TABLET ORAL at 18:33

## 2019-10-30 RX ADMIN — METRONIDAZOLE 500 MG: 500 INJECTION, SOLUTION INTRAVENOUS at 03:28

## 2019-10-31 VITALS
SYSTOLIC BLOOD PRESSURE: 115 MMHG | WEIGHT: 141 LBS | HEART RATE: 76 BPM | TEMPERATURE: 98.4 F | HEIGHT: 64 IN | BODY MASS INDEX: 24.07 KG/M2 | DIASTOLIC BLOOD PRESSURE: 71 MMHG | RESPIRATION RATE: 16 BRPM | OXYGEN SATURATION: 92 %

## 2019-10-31 LAB
ALBUMIN SERPL-MCNC: 3.6 G/DL (ref 3.5–5.2)
ALP BLD-CCNC: 75 U/L (ref 35–104)
ALT SERPL-CCNC: 12 U/L (ref 0–32)
ANION GAP SERPL CALCULATED.3IONS-SCNC: 12 MMOL/L (ref 7–16)
AST SERPL-CCNC: 16 U/L (ref 0–31)
BASOPHILS ABSOLUTE: 0.04 E9/L (ref 0–0.2)
BASOPHILS RELATIVE PERCENT: 0.4 % (ref 0–2)
BILIRUB SERPL-MCNC: 0.6 MG/DL (ref 0–1.2)
BUN BLDV-MCNC: 14 MG/DL (ref 8–23)
CALCIUM SERPL-MCNC: 9 MG/DL (ref 8.6–10.2)
CHLORIDE BLD-SCNC: 105 MMOL/L (ref 98–107)
CO2: 24 MMOL/L (ref 22–29)
CREAT SERPL-MCNC: 0.9 MG/DL (ref 0.5–1)
EOSINOPHILS ABSOLUTE: 0.02 E9/L (ref 0.05–0.5)
EOSINOPHILS RELATIVE PERCENT: 0.2 % (ref 0–6)
GFR AFRICAN AMERICAN: >60
GFR NON-AFRICAN AMERICAN: 59 ML/MIN/1.73
GLUCOSE BLD-MCNC: 119 MG/DL (ref 74–99)
HCT VFR BLD CALC: 39 % (ref 34–48)
HEMOGLOBIN: 12.3 G/DL (ref 11.5–15.5)
IMMATURE GRANULOCYTES #: 0.02 E9/L
IMMATURE GRANULOCYTES %: 0.2 % (ref 0–5)
LYMPHOCYTES ABSOLUTE: 1.53 E9/L (ref 1.5–4)
LYMPHOCYTES RELATIVE PERCENT: 17 % (ref 20–42)
MCH RBC QN AUTO: 30.4 PG (ref 26–35)
MCHC RBC AUTO-ENTMCNC: 31.5 % (ref 32–34.5)
MCV RBC AUTO: 96.3 FL (ref 80–99.9)
MONOCYTES ABSOLUTE: 0.86 E9/L (ref 0.1–0.95)
MONOCYTES RELATIVE PERCENT: 9.5 % (ref 2–12)
NEUTROPHILS ABSOLUTE: 6.54 E9/L (ref 1.8–7.3)
NEUTROPHILS RELATIVE PERCENT: 72.7 % (ref 43–80)
PDW BLD-RTO: 13.2 FL (ref 11.5–15)
PLATELET # BLD: 145 E9/L (ref 130–450)
PMV BLD AUTO: 10.6 FL (ref 7–12)
POTASSIUM REFLEX MAGNESIUM: 3.9 MMOL/L (ref 3.5–5)
RBC # BLD: 4.05 E12/L (ref 3.5–5.5)
SODIUM BLD-SCNC: 141 MMOL/L (ref 132–146)
TOTAL PROTEIN: 6.3 G/DL (ref 6.4–8.3)
WBC # BLD: 9 E9/L (ref 4.5–11.5)

## 2019-10-31 PROCEDURE — 80053 COMPREHEN METABOLIC PANEL: CPT

## 2019-10-31 PROCEDURE — 6370000000 HC RX 637 (ALT 250 FOR IP): Performed by: STUDENT IN AN ORGANIZED HEALTH CARE EDUCATION/TRAINING PROGRAM

## 2019-10-31 PROCEDURE — 99239 HOSP IP/OBS DSCHRG MGMT >30: CPT | Performed by: INTERNAL MEDICINE

## 2019-10-31 PROCEDURE — 2500000003 HC RX 250 WO HCPCS: Performed by: STUDENT IN AN ORGANIZED HEALTH CARE EDUCATION/TRAINING PROGRAM

## 2019-10-31 PROCEDURE — 2580000003 HC RX 258: Performed by: INTERNAL MEDICINE

## 2019-10-31 PROCEDURE — 36415 COLL VENOUS BLD VENIPUNCTURE: CPT

## 2019-10-31 PROCEDURE — 6370000000 HC RX 637 (ALT 250 FOR IP): Performed by: INTERNAL MEDICINE

## 2019-10-31 PROCEDURE — 85025 COMPLETE CBC W/AUTO DIFF WBC: CPT

## 2019-10-31 RX ADMIN — METRONIDAZOLE 500 MG: 500 INJECTION, SOLUTION INTRAVENOUS at 04:00

## 2019-10-31 RX ADMIN — LOSARTAN POTASSIUM 100 MG: 50 TABLET ORAL at 08:13

## 2019-10-31 RX ADMIN — DORZOLAMIDE HYDROCHLORIDE AND TIMOLOL MALEATE 1 DROP: 20; 5 SOLUTION/ DROPS OPHTHALMIC at 08:15

## 2019-10-31 RX ADMIN — CITALOPRAM HYDROBROMIDE 10 MG: 20 TABLET ORAL at 08:14

## 2019-10-31 RX ADMIN — CEFDINIR 300 MG: 300 CAPSULE ORAL at 08:14

## 2019-10-31 RX ADMIN — SKIN PROTECTANT: 44 OINTMENT TOPICAL at 11:35

## 2019-10-31 RX ADMIN — METRONIDAZOLE 500 MG: 500 TABLET ORAL at 10:35

## 2019-10-31 RX ADMIN — Medication 10 ML: at 08:17

## 2019-10-31 RX ADMIN — AMLODIPINE BESYLATE 2.5 MG: 2.5 TABLET ORAL at 08:14

## 2019-11-01 LAB — URINE CULTURE, ROUTINE: NORMAL

## 2019-11-04 ENCOUNTER — TELEPHONE (OUTPATIENT)
Dept: PHARMACY | Facility: CLINIC | Age: 84
End: 2019-11-04

## 2019-11-06 ENCOUNTER — TELEPHONE (OUTPATIENT)
Dept: CARDIOLOGY CLINIC | Age: 84
End: 2019-11-06

## 2019-11-08 ENCOUNTER — TELEPHONE (OUTPATIENT)
Dept: PRIMARY CARE CLINIC | Age: 84
End: 2019-11-08

## 2019-11-11 DIAGNOSIS — I10 ESSENTIAL HYPERTENSION: ICD-10-CM

## 2019-11-11 RX ORDER — AMLODIPINE BESYLATE 2.5 MG/1
2.5 TABLET ORAL EVERY MORNING
Qty: 90 TABLET | Refills: 12 | Status: SHIPPED
Start: 2019-11-11 | End: 2020-10-27 | Stop reason: SDUPTHER

## 2019-11-23 RX ORDER — ACETAMINOPHEN 160 MG
1 TABLET,DISINTEGRATING ORAL DAILY
Qty: 90 CAPSULE | Refills: 3 | Status: ON HOLD
Start: 2019-11-23 | End: 2022-01-01 | Stop reason: HOSPADM

## 2019-11-25 ENCOUNTER — TELEPHONE (OUTPATIENT)
Dept: PRIMARY CARE CLINIC | Age: 84
End: 2019-11-25

## 2020-01-24 ENCOUNTER — TELEPHONE (OUTPATIENT)
Dept: ADMINISTRATIVE | Age: 85
End: 2020-01-24

## 2020-01-28 ENCOUNTER — TELEPHONE (OUTPATIENT)
Dept: PRIMARY CARE CLINIC | Age: 85
End: 2020-01-28

## 2020-01-28 ENCOUNTER — OFFICE VISIT (OUTPATIENT)
Dept: FAMILY MEDICINE CLINIC | Age: 85
End: 2020-01-28
Payer: MEDICARE

## 2020-01-28 VITALS — OXYGEN SATURATION: 96 % | TEMPERATURE: 97.5 F | HEART RATE: 65 BPM | BODY MASS INDEX: 24.37 KG/M2 | WEIGHT: 142 LBS

## 2020-01-28 LAB
APPEARANCE FLUID: NORMAL
BILIRUBIN, POC: NORMAL
BLOOD URINE, POC: NORMAL
CLARITY, POC: CLEAR
COLOR, POC: YELLOW
GLUCOSE URINE, POC: NORMAL
KETONES, POC: NORMAL
LEUKOCYTE EST, POC: NORMAL
NITRITE, POC: NORMAL
PH, POC: 6.5
PROTEIN, POC: NORMAL
SPECIFIC GRAVITY, POC: 1.01
UROBILINOGEN, POC: 0.2

## 2020-01-28 PROCEDURE — 99213 OFFICE O/P EST LOW 20 MIN: CPT | Performed by: FAMILY MEDICINE

## 2020-01-28 PROCEDURE — G8427 DOCREV CUR MEDS BY ELIG CLIN: HCPCS | Performed by: FAMILY MEDICINE

## 2020-01-28 PROCEDURE — G8484 FLU IMMUNIZE NO ADMIN: HCPCS | Performed by: FAMILY MEDICINE

## 2020-01-28 PROCEDURE — 81002 URINALYSIS NONAUTO W/O SCOPE: CPT | Performed by: FAMILY MEDICINE

## 2020-01-28 PROCEDURE — 1123F ACP DISCUSS/DSCN MKR DOCD: CPT | Performed by: FAMILY MEDICINE

## 2020-01-28 PROCEDURE — 1090F PRES/ABSN URINE INCON ASSESS: CPT | Performed by: FAMILY MEDICINE

## 2020-01-28 PROCEDURE — G8420 CALC BMI NORM PARAMETERS: HCPCS | Performed by: FAMILY MEDICINE

## 2020-01-28 PROCEDURE — 1036F TOBACCO NON-USER: CPT | Performed by: FAMILY MEDICINE

## 2020-01-28 PROCEDURE — 4040F PNEUMOC VAC/ADMIN/RCVD: CPT | Performed by: FAMILY MEDICINE

## 2020-01-28 RX ORDER — CIPROFLOXACIN 250 MG/1
250 TABLET, FILM COATED ORAL 2 TIMES DAILY
Qty: 6 TABLET | Refills: 1 | Status: SHIPPED
Start: 2020-01-28 | End: 2020-02-17

## 2020-01-28 ASSESSMENT — ENCOUNTER SYMPTOMS: RESPIRATORY NEGATIVE: 1

## 2020-01-28 NOTE — PROGRESS NOTES
by mouth nightly, Disp: 90 tablet, Rfl: 5    citalopram (CELEXA) 10 MG tablet, Take 1 tablet by mouth every morning, Disp: 90 tablet, Rfl: 12    losartan (COZAAR) 100 MG tablet, Take 1 tablet by mouth daily, Disp: 90 tablet, Rfl: 5    latanoprost (XALATAN) 0.005 % ophthalmic solution, Place 1 drop into both eyes nightly, Disp: , Rfl:     Naproxen Sodium (ALEVE) 220 MG CAPS, Take 1 capsule by mouth daily as needed for Pain HOLD FOR PROCEDURE, Disp: , Rfl:   Allergies   Allergen Reactions    Enalapril Maleate Other (See Comments)     Other reaction(s): cough/rash    Penicillins     Sulfa Antibiotics        Past Medical History:   Diagnosis Date    Glaucoma     Hyperlipidemia     Hypertension      Past Surgical History:   Procedure Laterality Date    CYSTOSCOPY  04/04/2017    cysto retro left ureteroscopy, stent removal with Dr. Ramon Parrish  07/18/2017    stent placement    CYSTOSCOPY  10/19/2017    Left stent placement    HYSTERECTOMY      IN CYSTOURETHROSCOPY,URETER CATHETER Left 4/17/2018    CYSTOSCOPY RETROGRADE PYELOGRAM BLADDER AND LEFT URETERAL WASHINGS INSERTION LEFT URETERAL STENT LEFT URETEROSCOPY performed by Pastor Mauricio MD at [place] [id number]  1/29/15     Family History   Problem Relation Age of Onset    Early Death Mother     Prostate Cancer Father     Kidney Cancer Sister     Cancer Brother     Alzheimer's Disease Brother     Cancer Sister      Social History     Tobacco Use    Smoking status: Never Smoker    Smokeless tobacco: Never Used   Substance Use Topics    Alcohol use: Never     Frequency: Never    Drug use: Never        Vitals:    01/28/20 1207   Pulse: 65   Temp: 97.5 °F (36.4 °C)   SpO2: 96%   Weight: 142 lb (64.4 kg)       Physical Exam  Cardiovascular:      Rate and Rhythm: Regular rhythm. Heart sounds: No murmur. Pulmonary:      Effort: Pulmonary effort is normal.      Breath sounds: Normal breath sounds. Abdominal:      General: Abdomen is flat. Palpations: Abdomen is soft. Tenderness: There is no right CVA tenderness or left CVA tenderness. Assessment and Plan:  Jennifer Godoy was seen today for hematuria and urinary frequency. Diagnoses and all orders for this visit:    Acute cystitis with hematuria  -     POCT Urinalysis no Micro  -     External Referral To Urology    Other orders  -     ciprofloxacin (CIPRO) 250 MG tablet; Take 1 tablet by mouth 2 times daily        Orders Placed This Encounter   Medications    ciprofloxacin (CIPRO) 250 MG tablet     Sig: Take 1 tablet by mouth 2 times daily     Dispense:  6 tablet     Refill:  1        Patient advised to follow up with PCP as needed.         Seen By:  Ora Judd, DO

## 2020-01-28 NOTE — PATIENT INSTRUCTIONS
The patient's urine was heavy with blood 4+ blood minimal nitrates and leukocytes was not cultured she was placed on Cipro 250 twice daily for 3 days and an appointment was set up with Dr. Bree Coley reassured the for evaluation and treatment and I will notify Dr. Chary Arriaga of her problem.

## 2020-02-04 ENCOUNTER — HOSPITAL ENCOUNTER (OUTPATIENT)
Age: 85
Discharge: HOME | End: 2020-02-06
Payer: MEDICARE

## 2020-02-04 LAB
ALBUMIN SERPL-MCNC: 4.3 G/DL (ref 3.5–5.2)
ALP BLD-CCNC: 86 U/L (ref 35–104)
ALT SERPL-CCNC: 12 U/L (ref 0–32)
ANION GAP SERPL CALCULATED.3IONS-SCNC: 14 MMOL/L (ref 7–16)
AST SERPL-CCNC: 19 U/L (ref 0–31)
BASOPHILS ABSOLUTE: 0.06 E9/L (ref 0–0.2)
BASOPHILS RELATIVE PERCENT: 1.1 % (ref 0–2)
BILIRUB SERPL-MCNC: 0.6 MG/DL (ref 0–1.2)
BUN BLDV-MCNC: 23 MG/DL (ref 8–23)
CALCIUM SERPL-MCNC: 9.6 MG/DL (ref 8.6–10.2)
CHLORIDE BLD-SCNC: 107 MMOL/L (ref 98–107)
CHOLESTEROL, TOTAL: 209 MG/DL (ref 0–199)
CO2: 21 MMOL/L (ref 22–29)
CREAT SERPL-MCNC: 0.9 MG/DL (ref 0.5–1)
EOSINOPHILS ABSOLUTE: 0.12 E9/L (ref 0.05–0.5)
EOSINOPHILS RELATIVE PERCENT: 2.1 % (ref 0–6)
GFR AFRICAN AMERICAN: >60
GFR NON-AFRICAN AMERICAN: 59 ML/MIN/1.73
GLUCOSE BLD-MCNC: 101 MG/DL (ref 74–99)
HBA1C MFR BLD: 6 % (ref 4–5.6)
HCT VFR BLD CALC: 46.3 % (ref 34–48)
HDLC SERPL-MCNC: 52 MG/DL
HEMOGLOBIN: 13.9 G/DL (ref 11.5–15.5)
IMMATURE GRANULOCYTES #: 0.02 E9/L
IMMATURE GRANULOCYTES %: 0.4 % (ref 0–5)
LDL CHOLESTEROL CALCULATED: 127 MG/DL (ref 0–99)
LYMPHOCYTES ABSOLUTE: 1.36 E9/L (ref 1.5–4)
LYMPHOCYTES RELATIVE PERCENT: 24.1 % (ref 20–42)
MCH RBC QN AUTO: 29.5 PG (ref 26–35)
MCHC RBC AUTO-ENTMCNC: 30 % (ref 32–34.5)
MCV RBC AUTO: 98.3 FL (ref 80–99.9)
MONOCYTES ABSOLUTE: 0.45 E9/L (ref 0.1–0.95)
MONOCYTES RELATIVE PERCENT: 8 % (ref 2–12)
NEUTROPHILS ABSOLUTE: 3.63 E9/L (ref 1.8–7.3)
NEUTROPHILS RELATIVE PERCENT: 64.3 % (ref 43–80)
PDW BLD-RTO: 13.3 FL (ref 11.5–15)
PLATELET # BLD: 174 E9/L (ref 130–450)
PMV BLD AUTO: 10.9 FL (ref 7–12)
POTASSIUM SERPL-SCNC: 4.3 MMOL/L (ref 3.5–5)
RBC # BLD: 4.71 E12/L (ref 3.5–5.5)
SODIUM BLD-SCNC: 142 MMOL/L (ref 132–146)
T4 TOTAL: 7.6 MCG/DL (ref 4.5–11.7)
TOTAL PROTEIN: 7.5 G/DL (ref 6.4–8.3)
TRIGL SERPL-MCNC: 149 MG/DL (ref 0–149)
TSH SERPL DL<=0.05 MIU/L-ACNC: 3.52 UIU/ML (ref 0.27–4.2)
VITAMIN D 25-HYDROXY: 38 NG/ML (ref 30–100)
VLDLC SERPL CALC-MCNC: 30 MG/DL
WBC # BLD: 5.6 E9/L (ref 4.5–11.5)

## 2020-02-04 PROCEDURE — 80053 COMPREHEN METABOLIC PANEL: CPT

## 2020-02-04 PROCEDURE — 80061 LIPID PANEL: CPT

## 2020-02-04 PROCEDURE — 84443 ASSAY THYROID STIM HORMONE: CPT

## 2020-02-04 PROCEDURE — 85025 COMPLETE CBC W/AUTO DIFF WBC: CPT

## 2020-02-04 PROCEDURE — 84436 ASSAY OF TOTAL THYROXINE: CPT

## 2020-02-04 PROCEDURE — 83036 HEMOGLOBIN GLYCOSYLATED A1C: CPT

## 2020-02-04 PROCEDURE — 82306 VITAMIN D 25 HYDROXY: CPT

## 2020-02-04 PROCEDURE — 36415 COLL VENOUS BLD VENIPUNCTURE: CPT

## 2020-02-07 ENCOUNTER — HOSPITAL ENCOUNTER (OUTPATIENT)
Age: 85
Discharge: HOME | End: 2020-02-09
Payer: MEDICARE

## 2020-02-07 PROCEDURE — 88112 CYTOPATH CELL ENHANCE TECH: CPT

## 2020-02-17 ENCOUNTER — TELEPHONE (OUTPATIENT)
Dept: CARDIOLOGY CLINIC | Age: 85
End: 2020-02-17

## 2020-02-17 ENCOUNTER — OFFICE VISIT (OUTPATIENT)
Dept: PRIMARY CARE CLINIC | Age: 85
End: 2020-02-17
Payer: MEDICARE

## 2020-02-17 VITALS
SYSTOLIC BLOOD PRESSURE: 126 MMHG | HEIGHT: 60 IN | WEIGHT: 142 LBS | BODY MASS INDEX: 27.88 KG/M2 | DIASTOLIC BLOOD PRESSURE: 78 MMHG | TEMPERATURE: 97.9 F

## 2020-02-17 LAB
BILIRUBIN, POC: NORMAL
BLOOD URINE, POC: NORMAL
CLARITY, POC: NORMAL
COLOR, POC: YELLOW
GLUCOSE URINE, POC: NORMAL
KETONES, POC: NORMAL
LEUKOCYTE EST, POC: NORMAL
NITRITE, POC: NORMAL
PH, POC: 6
PROTEIN, POC: NORMAL
SPECIFIC GRAVITY, POC: 1.01
UROBILINOGEN, POC: 2

## 2020-02-17 PROCEDURE — 81002 URINALYSIS NONAUTO W/O SCOPE: CPT | Performed by: FAMILY MEDICINE

## 2020-02-17 PROCEDURE — 1123F ACP DISCUSS/DSCN MKR DOCD: CPT | Performed by: FAMILY MEDICINE

## 2020-02-17 PROCEDURE — 4040F PNEUMOC VAC/ADMIN/RCVD: CPT | Performed by: FAMILY MEDICINE

## 2020-02-17 PROCEDURE — G8427 DOCREV CUR MEDS BY ELIG CLIN: HCPCS | Performed by: FAMILY MEDICINE

## 2020-02-17 PROCEDURE — 99214 OFFICE O/P EST MOD 30 MIN: CPT | Performed by: FAMILY MEDICINE

## 2020-02-17 PROCEDURE — G8484 FLU IMMUNIZE NO ADMIN: HCPCS | Performed by: FAMILY MEDICINE

## 2020-02-17 PROCEDURE — G8417 CALC BMI ABV UP PARAM F/U: HCPCS | Performed by: FAMILY MEDICINE

## 2020-02-17 PROCEDURE — 1090F PRES/ABSN URINE INCON ASSESS: CPT | Performed by: FAMILY MEDICINE

## 2020-02-17 PROCEDURE — 1036F TOBACCO NON-USER: CPT | Performed by: FAMILY MEDICINE

## 2020-02-17 NOTE — PROGRESS NOTES
20  Name: Carmelo Stahl    : 3/20/1928    Sex: female    Age: 80 y.o. Subjective:  Chief Complaint: Patient is here for   6 m ock  Re     Chol and lab     Here with imani    had blood urine and   Saw  jeremiahiutscar  Last week and for  Cysto   In   Two week s    Fel ok     No  Cp ros ob    Hb  Better  She was in the hospital in October and a new heart murmur was formed at that time. Cardiology evaluated and did do echocardiogram.  She was in with gallbladder symptoms they are prepping for surgery but she refused to have the gallbladder surgery done and has had no symptoms since she has no chest pain or shortness of breath. Review of Systems   Constitutional: Negative. HENT: Negative. Eyes: Negative. Respiratory: Negative. Negative for shortness of breath. Cardiovascular: Negative. Negative for chest pain and leg swelling. Gastrointestinal: Negative. Endocrine: Negative. Genitourinary: Positive for hematuria. Musculoskeletal: Negative. Skin: Negative. Allergic/Immunologic: Negative. Neurological: Negative. Hematological: Negative. Psychiatric/Behavioral: Negative.           Current Outpatient Medications:     Cholecalciferol (VITAMIN D-3 SUPER STRENGTH) 50 MCG (2000 UT) TABS, Take 1 tablet by mouth daily, Disp: 90 tablet, Rfl: 5    Cholecalciferol (VITAMIN D3) 50 MCG (2000 UT) CAPS, Take 1 capsule by mouth daily, Disp: 90 capsule, Rfl: 3    amLODIPine (NORVASC) 2.5 MG tablet, Take 1 tablet by mouth every morning, Disp: 90 tablet, Rfl: 12    calcium carbonate (TUMS) 500 MG chewable tablet, Take 1 tablet by mouth daily, Disp: , Rfl:     bimatoprost (LUMIGAN) 0.01 % SOLN ophthalmic drops, Place 1 drop into both eyes nightly, Disp: , Rfl:     dorzolamide-timolol (COSOPT) 22.3-6.8 MG/ML ophthalmic solution, Place 1 drop into both eyes 2 times daily, Disp: , Rfl:     famotidine (PEPCID) 10 MG tablet, Take 10 mg by mouth 2 times daily, Disp: , Rfl:    CYSTOURETHROSCOPY,URETER CATHETER Left 4/17/2018    CYSTOSCOPY RETROGRADE PYELOGRAM BLADDER AND LEFT URETERAL WASHINGS INSERTION LEFT URETERAL STENT LEFT URETEROSCOPY performed by Laura Rutherford [facility]  1/29/15      Vitals:    02/17/20 0943   BP: 126/78   Temp: 97.9 °F (36.6 °C)   Weight: 142 lb (64.4 kg)   Height: 5' (1.524 m)       Objective:    Physical Exam  Vitals signs reviewed. Constitutional:       Appearance: She is well-developed. HENT:      Head: Normocephalic. Eyes:      Pupils: Pupils are equal, round, and reactive to light. Neck:      Musculoskeletal: Normal range of motion. Cardiovascular:      Rate and Rhythm: Normal rate and regular rhythm. Heart sounds: Murmur present. Pulmonary:      Effort: Pulmonary effort is normal.      Breath sounds: Normal breath sounds. Abdominal:      Palpations: Abdomen is soft. Musculoskeletal: Normal range of motion. Skin:     General: Skin is warm. Neurological:      Mental Status: She is alert and oriented to person, place, and time. Psychiatric:         Behavior: Behavior normal.         [facility]. was seen today for hyperlipidemia, hypertension and results. Diagnoses and all orders for this visit:    Acute cystitis with hematuria  -     POCT Urinalysis no Micro    Nonrheumatic aortic valve insufficiency  -     [address] [facility]    Essential hypertension    Mixed hyperlipidemia    Biliary calculus of other site without obstruction        Comments: There is an appoint with cardiology for preoperative clearance regarding her aortic regurgitation before her cystoscopy in 3 weeks. Low-fat low sugar diet. Exercise. Check blood pressure at home weekly. Low-fat diet. Call if any gallbladder symptoms. UA is noted today. No burning sensation will hold off on treatment unless symptoms. A great deal of time spent reviewing medications, diet, exercise, social issues.  Also reviewing the chart before entering the room with patient and finishing charting after leaving patient's room. More than half of that time was spent face to face with the patient in counseling and coordinating care.       Follow Up: Return in about 6 months (around 8/17/2020) for lab  beofre   see  referral.     Seen by:  Bud Vickers DO

## 2020-02-17 NOTE — TELEPHONE ENCOUNTER
Left patient a message to call and schedule a appt with either Dr Magdalena Goldberg or any available Dr. Marcia Carlos in hospital. referral in que.

## 2020-02-18 PROBLEM — I35.1 NONRHEUMATIC AORTIC VALVE INSUFFICIENCY: Status: ACTIVE | Noted: 2020-02-18

## 2020-02-18 PROBLEM — K21.9 GASTROESOPHAGEAL REFLUX DISEASE WITHOUT ESOPHAGITIS: Chronic | Status: ACTIVE | Noted: 2019-08-20

## 2020-02-18 PROBLEM — K80.20 CHOLELITHIASIS: Chronic | Status: ACTIVE | Noted: 2020-02-18

## 2020-02-18 PROBLEM — K80.20 CHOLELITHIASIS: Status: ACTIVE | Noted: 2020-02-18

## 2020-02-18 PROBLEM — E11.9 DIET-CONTROLLED DIABETES MELLITUS (HCC): Status: RESOLVED | Noted: 2019-08-20 | Resolved: 2020-02-18

## 2020-02-18 PROBLEM — M17.11 LOCALIZED OSTEOARTHRITIS OF RIGHT KNEE: Chronic | Status: ACTIVE | Noted: 2019-04-09

## 2020-02-18 PROBLEM — E78.2 MIXED HYPERLIPIDEMIA: Chronic | Status: ACTIVE | Noted: 2019-08-20

## 2020-02-18 PROBLEM — K81.0 ACUTE CHOLECYSTITIS: Status: RESOLVED | Noted: 2019-10-29 | Resolved: 2020-02-18

## 2020-02-18 PROBLEM — I10 ESSENTIAL HYPERTENSION: Chronic | Status: ACTIVE | Noted: 2019-08-20

## 2020-02-18 PROBLEM — I35.1 NONRHEUMATIC AORTIC VALVE INSUFFICIENCY: Chronic | Status: ACTIVE | Noted: 2020-02-18

## 2020-02-18 PROBLEM — F41.9 ANXIETY: Chronic | Status: ACTIVE | Noted: 2019-08-20

## 2020-02-18 ASSESSMENT — ENCOUNTER SYMPTOMS: SHORTNESS OF BREATH: 0

## 2020-02-18 ASSESSMENT — PATIENT HEALTH QUESTIONNAIRE - PHQ9
2. FEELING DOWN, DEPRESSED OR HOPELESS: 0
1. LITTLE INTEREST OR PLEASURE IN DOING THINGS: 0
SUM OF ALL RESPONSES TO PHQ QUESTIONS 1-9: 0
SUM OF ALL RESPONSES TO PHQ QUESTIONS 1-9: 0
SUM OF ALL RESPONSES TO PHQ9 QUESTIONS 1 & 2: 0

## 2020-02-25 ENCOUNTER — OFFICE VISIT (OUTPATIENT)
Dept: CARDIOLOGY CLINIC | Age: 85
End: 2020-02-25
Payer: MEDICARE

## 2020-02-25 VITALS
HEART RATE: 61 BPM | BODY MASS INDEX: 27.48 KG/M2 | RESPIRATION RATE: 16 BRPM | SYSTOLIC BLOOD PRESSURE: 136 MMHG | WEIGHT: 140 LBS | DIASTOLIC BLOOD PRESSURE: 82 MMHG | HEIGHT: 60 IN

## 2020-02-25 PROCEDURE — 4040F PNEUMOC VAC/ADMIN/RCVD: CPT | Performed by: INTERNAL MEDICINE

## 2020-02-25 PROCEDURE — G8484 FLU IMMUNIZE NO ADMIN: HCPCS | Performed by: INTERNAL MEDICINE

## 2020-02-25 PROCEDURE — 1090F PRES/ABSN URINE INCON ASSESS: CPT | Performed by: INTERNAL MEDICINE

## 2020-02-25 PROCEDURE — 1123F ACP DISCUSS/DSCN MKR DOCD: CPT | Performed by: INTERNAL MEDICINE

## 2020-02-25 PROCEDURE — 93000 ELECTROCARDIOGRAM COMPLETE: CPT | Performed by: INTERNAL MEDICINE

## 2020-02-25 PROCEDURE — G8427 DOCREV CUR MEDS BY ELIG CLIN: HCPCS | Performed by: INTERNAL MEDICINE

## 2020-02-25 PROCEDURE — G8417 CALC BMI ABV UP PARAM F/U: HCPCS | Performed by: INTERNAL MEDICINE

## 2020-02-25 PROCEDURE — 1036F TOBACCO NON-USER: CPT | Performed by: INTERNAL MEDICINE

## 2020-02-25 PROCEDURE — 99214 OFFICE O/P EST MOD 30 MIN: CPT | Performed by: INTERNAL MEDICINE

## 2020-02-25 NOTE — PROGRESS NOTES
Patient Active Problem List   Diagnosis    Glaucoma    Gastric mass    Localized osteoarthritis of right knee    Essential hypertension    Mixed hyperlipidemia    Gastroesophageal reflux disease without esophagitis    Anxiety    Nonrheumatic aortic valve stenosis    Nonrheumatic aortic valve insufficiency    Cholelithiasis       Current Outpatient Medications   Medication Sig Dispense Refill    Multiple Vitamins-Minerals (ONCOVITE PO) Take by mouth daily      Cholecalciferol (VITAMIN D3) 50 MCG (2000 UT) CAPS Take 1 capsule by mouth daily 90 capsule 3    amLODIPine (NORVASC) 2.5 MG tablet Take 1 tablet by mouth every morning 90 tablet 12    calcium carbonate (TUMS) 500 MG chewable tablet Take 1 tablet by mouth daily      bimatoprost (LUMIGAN) 0.01 % SOLN ophthalmic drops Place 1 drop into both eyes nightly      dorzolamide-timolol (COSOPT) 22.3-6.8 MG/ML ophthalmic solution Place 1 drop into both eyes 2 times daily      RHOPRESSA 0.02 % SOLN Apply 1 drop to eye daily Indications: administer one drop in both eyes every morning      simvastatin (ZOCOR) 20 MG tablet Take 1 tablet by mouth nightly 90 tablet 5    citalopram (CELEXA) 10 MG tablet Take 1 tablet by mouth every morning 90 tablet 12    losartan (COZAAR) 100 MG tablet Take 1 tablet by mouth daily 90 tablet 5    Naproxen Sodium (ALEVE) 220 MG CAPS Take 1 capsule by mouth daily as needed for Pain HOLD FOR PROCEDURE       No current facility-administered medications for this visit. CC:    Patient is seen in follow up for:  1. Preoperative clearance    2. Nonrheumatic aortic valve insufficiency    3. Nonrheumatic aortic valve stenosis    4. Essential hypertension    5. Mixed hyperlipidemia        HPI:  Patient is seen in preoperative evaluation prior to cystoscopy. She is noted to have some hematuria. Patient is doing well without any specific cardiac problems.   Patient denies any shortness of breath, chest pain, lightheadedness or Other Topics Concern    Not on file   Social History Narrative        Stress Test - 09 neg. (Jaimes)    Duplex Carotid Ultasound - 09 no evidence of significant stenosis    HTN    LIPID    GLAUCOMA    C HYSTER    CVA--    OA L KNEE    DIET DM    SON SHAHZAD HINES AND WILL LIVE WITH HER    Pam Luis  1928 Page #2    LOW VIT B12 AND VIT D 4-10    DAUGHTER WORKS AT DR [employer] OFFICE    GYN DR Lewis Roys    US KIDNEY WITH RIGHT RENAL CYST AND SEVERAL RIGHT RENAL STONES    ADMIT WITH GI BLEED----AND EGD WITH ESOPHAGITIS AND POSS MASS BUT BX NEG---    2-15----DR HORN------RE DONE DR WALLER -15    ER WITH FALL WITH 20 % COMP FX L-1 AND CT ABD IWTH LEFT HYDRONEPHROSIS AND    URETER WITH NO STONE------GB STONE OR SLUDGE NOTED    CT 16 WITH DISTAL LEFT URETER MASS---DX WITH URETER CA---- LEFT----DR MAGGI FITZPATRICK-----STENT    SON DX WITH BLADDER CA 12-16    Admit  10-19 with GB sx and new cary murmur   Seen by  Kelley panchal and echo with mod AR       Family History   Problem Relation Age of Onset    Early Death Mother     Prostate Cancer Father     Kidney Cancer Sister     Cancer Brother     Alzheimer's Disease Brother     Cancer Sister        Past Medical History:   Diagnosis Date    Abdominal pain 2015    Acute cholecystitis 10/29/2019    Diet-controlled diabetes mellitus ([address] 2019    Gastritis 2015    Glaucoma     Hematemesis 2015    Hyperlipidemia     Hypertension        PHYSICAL EXAM:  CONSTITUTIONAL:  Well developed, well nourished    Vitals:    20 0933   BP: 136/82   Pulse: 61   Resp: 16   Weight: 140 lb (63.5 kg)   Height: 5' (1.524 m)     HEAD & FACE: Normocephalic. Symmetric. EYES: No xanthelasma. Conjunctivae not injected. EARS, NOSE, MOUTH & THROAT: Good dentition. No oral pallor or cyanosis. NECK: No JVD at 30 degrees. No thyromegaly. RESPIRATORY: Clear to auscultation and percussion in all fields.   No use of accessory muscle

## 2020-04-03 ENCOUNTER — TELEPHONE (OUTPATIENT)
Dept: PRIMARY CARE CLINIC | Age: 85
End: 2020-04-03

## 2020-04-03 RX ORDER — AMMONIUM LACTATE 12 G/100G
LOTION TOPICAL
Qty: 1 BOTTLE | Refills: 5 | Status: SHIPPED
Start: 2020-04-03 | End: 2022-01-01

## 2020-07-28 ENCOUNTER — TELEPHONE (OUTPATIENT)
Dept: PRIMARY CARE CLINIC | Age: 85
End: 2020-07-28

## 2020-08-04 ENCOUNTER — HOSPITAL ENCOUNTER (OUTPATIENT)
Age: 85
Discharge: HOME | End: 2020-08-06
Payer: MEDICARE

## 2020-08-04 LAB
ALBUMIN SERPL-MCNC: 4.3 G/DL (ref 3.5–5.2)
ALP BLD-CCNC: 89 U/L (ref 35–104)
ALT SERPL-CCNC: 11 U/L (ref 0–32)
ANION GAP SERPL CALCULATED.3IONS-SCNC: 13 MMOL/L (ref 7–16)
AST SERPL-CCNC: 17 U/L (ref 0–31)
BASOPHILS ABSOLUTE: 0.06 E9/L (ref 0–0.2)
BASOPHILS RELATIVE PERCENT: 0.9 % (ref 0–2)
BILIRUB SERPL-MCNC: 0.4 MG/DL (ref 0–1.2)
BUN BLDV-MCNC: 40 MG/DL (ref 8–23)
CALCIUM SERPL-MCNC: 9.8 MG/DL (ref 8.6–10.2)
CHLORIDE BLD-SCNC: 104 MMOL/L (ref 98–107)
CHOLESTEROL, TOTAL: 197 MG/DL (ref 0–199)
CO2: 25 MMOL/L (ref 22–29)
CREAT SERPL-MCNC: 1.5 MG/DL (ref 0.5–1)
EOSINOPHILS ABSOLUTE: 0.13 E9/L (ref 0.05–0.5)
EOSINOPHILS RELATIVE PERCENT: 2 % (ref 0–6)
GFR AFRICAN AMERICAN: 39
GFR NON-AFRICAN AMERICAN: 32 ML/MIN/1.73
GLUCOSE BLD-MCNC: 97 MG/DL (ref 74–99)
HBA1C MFR BLD: 6.1 % (ref 4–5.6)
HCT VFR BLD CALC: 43.5 % (ref 34–48)
HDLC SERPL-MCNC: 50 MG/DL
HEMOGLOBIN: 13.4 G/DL (ref 11.5–15.5)
IMMATURE GRANULOCYTES #: 0.02 E9/L
IMMATURE GRANULOCYTES %: 0.3 % (ref 0–5)
LDL CHOLESTEROL CALCULATED: 109 MG/DL (ref 0–99)
LYMPHOCYTES ABSOLUTE: 1.38 E9/L (ref 1.5–4)
LYMPHOCYTES RELATIVE PERCENT: 21.5 % (ref 20–42)
MCH RBC QN AUTO: 30.1 PG (ref 26–35)
MCHC RBC AUTO-ENTMCNC: 30.8 % (ref 32–34.5)
MCV RBC AUTO: 97.8 FL (ref 80–99.9)
MONOCYTES ABSOLUTE: 0.46 E9/L (ref 0.1–0.95)
MONOCYTES RELATIVE PERCENT: 7.2 % (ref 2–12)
NEUTROPHILS ABSOLUTE: 4.36 E9/L (ref 1.8–7.3)
NEUTROPHILS RELATIVE PERCENT: 68.1 % (ref 43–80)
PDW BLD-RTO: 13.2 FL (ref 11.5–15)
PLATELET # BLD: 179 E9/L (ref 130–450)
PMV BLD AUTO: 10.9 FL (ref 7–12)
POTASSIUM SERPL-SCNC: 4.9 MMOL/L (ref 3.5–5)
RBC # BLD: 4.45 E12/L (ref 3.5–5.5)
SODIUM BLD-SCNC: 142 MMOL/L (ref 132–146)
TOTAL PROTEIN: 7.4 G/DL (ref 6.4–8.3)
TRIGL SERPL-MCNC: 191 MG/DL (ref 0–149)
VITAMIN D 25-HYDROXY: 46 NG/ML (ref 30–100)
VLDLC SERPL CALC-MCNC: 38 MG/DL
WBC # BLD: 6.4 E9/L (ref 4.5–11.5)

## 2020-08-04 PROCEDURE — 82306 VITAMIN D 25 HYDROXY: CPT

## 2020-08-04 PROCEDURE — 36415 COLL VENOUS BLD VENIPUNCTURE: CPT

## 2020-08-04 PROCEDURE — 80053 COMPREHEN METABOLIC PANEL: CPT

## 2020-08-04 PROCEDURE — 85025 COMPLETE CBC W/AUTO DIFF WBC: CPT

## 2020-08-04 PROCEDURE — 80061 LIPID PANEL: CPT

## 2020-08-04 PROCEDURE — 83036 HEMOGLOBIN GLYCOSYLATED A1C: CPT

## 2020-08-06 ENCOUNTER — HOSPITAL ENCOUNTER (OUTPATIENT)
Age: 85
Discharge: HOME | End: 2020-08-08
Payer: MEDICARE

## 2020-08-06 ENCOUNTER — OFFICE VISIT (OUTPATIENT)
Dept: PRIMARY CARE CLINIC | Age: 85
End: 2020-08-06
Payer: MEDICARE

## 2020-08-06 VITALS
DIASTOLIC BLOOD PRESSURE: 78 MMHG | BODY MASS INDEX: 27.54 KG/M2 | WEIGHT: 141 LBS | TEMPERATURE: 98.1 F | SYSTOLIC BLOOD PRESSURE: 142 MMHG

## 2020-08-06 PROBLEM — C66.2: Status: ACTIVE | Noted: 2020-08-06

## 2020-08-06 LAB
BILIRUBIN, POC: NORMAL
BLOOD URINE, POC: NORMAL
CLARITY, POC: NORMAL
COLOR, POC: YELLOW
GLUCOSE URINE, POC: NORMAL
KETONES, POC: NORMAL
LEUKOCYTE EST, POC: NORMAL
NITRITE, POC: NORMAL
PH, POC: 6.5
PROTEIN, POC: NORMAL
SPECIFIC GRAVITY, POC: 1.02
UROBILINOGEN, POC: 0.2

## 2020-08-06 PROCEDURE — 1036F TOBACCO NON-USER: CPT | Performed by: FAMILY MEDICINE

## 2020-08-06 PROCEDURE — G8428 CUR MEDS NOT DOCUMENT: HCPCS | Performed by: FAMILY MEDICINE

## 2020-08-06 PROCEDURE — 99214 OFFICE O/P EST MOD 30 MIN: CPT | Performed by: FAMILY MEDICINE

## 2020-08-06 PROCEDURE — 87088 URINE BACTERIA CULTURE: CPT

## 2020-08-06 PROCEDURE — 1123F ACP DISCUSS/DSCN MKR DOCD: CPT | Performed by: FAMILY MEDICINE

## 2020-08-06 PROCEDURE — G8417 CALC BMI ABV UP PARAM F/U: HCPCS | Performed by: FAMILY MEDICINE

## 2020-08-06 PROCEDURE — 4040F PNEUMOC VAC/ADMIN/RCVD: CPT | Performed by: FAMILY MEDICINE

## 2020-08-06 PROCEDURE — 81002 URINALYSIS NONAUTO W/O SCOPE: CPT | Performed by: FAMILY MEDICINE

## 2020-08-06 PROCEDURE — 1090F PRES/ABSN URINE INCON ASSESS: CPT | Performed by: FAMILY MEDICINE

## 2020-08-06 ASSESSMENT — ENCOUNTER SYMPTOMS
ABDOMINAL PAIN: 0
DIARRHEA: 0
EYES NEGATIVE: 1
ALLERGIC/IMMUNOLOGIC NEGATIVE: 1
NAUSEA: 1
RESPIRATORY NEGATIVE: 1

## 2020-08-06 ASSESSMENT — PATIENT HEALTH QUESTIONNAIRE - PHQ9
SUM OF ALL RESPONSES TO PHQ QUESTIONS 1-9: 0
1. LITTLE INTEREST OR PLEASURE IN DOING THINGS: 0
2. FEELING DOWN, DEPRESSED OR HOPELESS: 0
SUM OF ALL RESPONSES TO PHQ QUESTIONS 1-9: 0
SUM OF ALL RESPONSES TO PHQ9 QUESTIONS 1 & 2: 0

## 2020-08-06 NOTE — PROGRESS NOTES
20  Name: Remberto Arellano    : 3/20/1928    Sex: female    Age: 80 y.o. Subjective:  Chief Complaint: Patient is here for dizzy and light heded   A nd lab     Happened few day s ago and again  One wek before   Here with imani daily  Dx covid----  seond epi yest   Was due to see spenser  Few mo ago and imani  cancleed   Du eto corona  Was du eot  The  Cysto again    Bun  crea up     Quite a bit form before   She hardl y dirnks much fluid      Review of Systems   Constitutional: Negative. HENT: Negative. Eyes: Negative. Respiratory: Negative. Cardiovascular: Negative. Gastrointestinal: Positive for nausea. Negative for abdominal pain and diarrhea. Endocrine: Negative. Genitourinary: Negative. Musculoskeletal: Negative. Skin: Negative. Allergic/Immunologic: Negative. Neurological: Negative. Hematological: Negative. Psychiatric/Behavioral: Negative. Current Outpatient Medications:     Handicap Placard MISC, by Does not apply route Dx  Arthritis   5  yrs, Disp: 1 each, Rfl: 0    ammonium lactate (LAC-HYDRIN) 12 % lotion, Apply topically daily. , Disp: 1 Bottle, Rfl: 5    Multiple Vitamins-Minerals (ONCOVITE PO), Take by mouth daily, Disp: , Rfl:     Cholecalciferol (VITAMIN D3) 50 MCG (2000) CAPS, Take 1 capsule by mouth daily, Disp: 90 capsule, Rfl: 3    amLODIPine (NORVASC) 2.5 MG tablet, Take 1 tablet by mouth every morning, Disp: 90 tablet, Rfl: 12    calcium carbonate (TUMS) 500 MG chewable tablet, Take 1 tablet by mouth daily, Disp: , Rfl:     bimatoprost (LUMIGAN) 0.01 % SOLN ophthalmic drops, Place 1 drop into both eyes nightly, Disp: , Rfl:     dorzolamide-timolol (COSOPT) 22.3-6.8 MG/ML ophthalmic solution, Place 1 drop into both eyes 2 times daily, Disp: , Rfl:     RHOPRESSA 0.02 % SOLN, Apply 1 drop to eye daily Indications: administer one drop in both eyes every morning, Disp: , Rfl:     simvastatin (ZOCOR) 20 MG tablet, Take 1 tablet by mouth nightly, Disp: 90 tablet, Rfl: 5    citalopram (CELEXA) 10 MG tablet, Take 1 tablet by mouth every morning, Disp: 90 tablet, Rfl: 12    losartan (COZAAR) 100 MG tablet, Take 1 tablet by mouth daily, Disp: 90 tablet, Rfl: 5  Allergies   Allergen Reactions    Enalapril Maleate Other (See Comments)     Other reaction(s): cough/rash    Penicillins     Sulfa Antibiotics      Social History     Socioeconomic History    Marital status:      Spouse name: Not on file    Number of children: Not on file    Years of education: Not on file    Highest education level: Not on file   Occupational History    Not on file   Social Needs    Financial resource strain: Not on file    Food insecurity     Worry: Not on file     Inability: Not on file    Transportation needs     Medical: Not on file     Non-medical: Not on file   Tobacco Use    Smoking status: Never Smoker    Smokeless tobacco: Never Used   Substance and Sexual Activity    Alcohol use: Never     Frequency: Never    Drug use: Never    Sexual activity: Never   Lifestyle    Physical activity     Days per week: Not on file     Minutes per session: Not on file    Stress: Not on file   Relationships    Social connections     Talks on phone: Not on file     Gets together: Not on file     Attends Christianity service: Not on file     Active member of club or organization: Not on file     Attends meetings of clubs or organizations: Not on file     Relationship status: Not on file    Intimate partner violence     Fear of current or ex partner: Not on file     Emotionally abused: Not on file     Physically abused: Not on file     Forced sexual activity: Not on file   Other Topics Concern    Not on file   Social History Narrative        Stress Test - 8/7/09 neg.  (Jaimes)    Duplex Carotid Ultasound - 8/5/09 no evidence of significant stenosis    HTN    LIPID    GLAUCOMA    C HYSTER    CVA--    OA L KNEE    DIET DM    SON [employer] AND WILL LIVE WITH HER    Pam Luis  1928 Page #2    LOW VIT B12 AND VIT D 4-10    DAUGHTER WORKS AT DR [employer] OFFICE    GYN DR Pascale Castañeda    US KIDNEY WITH RIGHT RENAL CYST AND SEVERAL RIGHT RENAL STONES    ADMIT WITH GI BLEED----AND EGD WITH ESOPHAGITIS AND POSS MASS BUT BX NEG---    2-15----DR HORN------RE DONE DR WALLER -15    ER WITH FALL WITH 20 % COMP FX L-1 AND CT ABD IWTH LEFT HYDRONEPHROSIS AND    URETER WITH NO STONE------GB STONE OR SLUDGE NOTED    CT  WITH DISTAL LEFT URETER MASS---DX WITH URETER CA---- LEFT----DR MAGGI FITZPATRICK-----STENT    SON DX WITH BLADDER CA     Admit  10-19 with GB sx and new cary murmur   Seen by  Rosie panchal and echo with mod AR      Past Medical History:   Diagnosis Date    Abdominal pain 2015    Acute cholecystitis 10/29/2019    Diet-controlled diabetes mellitus ([address] 2019    Gastritis 2015    Glaucoma     Hematemesis 2015    Hyperlipidemia     Hypertension      Family History   Problem Relation Age of Onset    Early Death Mother     Prostate Cancer Father     Kidney Cancer Sister     Cancer Brother     Alzheimer's Disease Brother     Cancer Sister       Past Surgical History:   Procedure Laterality Date    CYSTOSCOPY  2017    cysto retro left ureteroscopy, stent removal with Dr. Anna Maldonado  2017    stent placement    CYSTOSCOPY  10/19/2017    Left stent placement    HYSTERECTOMY      NE CYSTOURETHROSCOPY,URETER CATHETER Left 2018    CYSTOSCOPY RETROGRADE PYELOGRAM BLADDER AND LEFT URETERAL WASHINGS INSERTION LEFT URETERAL STENT LEFT URETEROSCOPY performed by Tammy Lawson MD at [facility] ENDOSCOPY  1/29/15      Vitals:    20 1245   BP: (!) 142/78   Temp: 98.1 °F (36.7 °C)   Weight: 141 lb (64 kg)       Objective:    Physical Exam  Vitals signs reviewed. Constitutional:       Appearance: She is well-developed.    HENT:      Head: Normocephalic. Eyes:      Pupils: Pupils are equal, round, and reactive to light. Neck:      Musculoskeletal: Normal range of motion. Cardiovascular:      Rate and Rhythm: Normal rate and regular rhythm. Pulmonary:      Effort: Pulmonary effort is normal.      Breath sounds: Normal breath sounds. Abdominal:      Palpations: Abdomen is soft. Musculoskeletal:      Comments: Dec rom both knees   Skin:     General: Skin is warm. Neurological:      Mental Status: She is alert and oriented to person, place, and time. Psychiatric:         Behavior: Behavior normal.         Pool Daniel was seen today for discuss labs and urinary tract infection. Diagnoses and all orders for this visit:    Acute cystitis without hematuria  -     POCT Urinalysis no Micro  -     Culture, Urine; Future    Dehydration  -     Comprehensive Metabolic Panel; Future    Ureteral carcinoma, left ([facility])    Kidney disease, chronic, stage III (GFR 30-59 ml/min) ([facility])  -     Comprehensive Metabolic Panel; Future  -     Handicap Placard MISC; by Does not apply route Dx  Arthritis   5  yrs    Exposure to COVID-19 virus  -     Covid-19, Antibody; Future        Comments: culture   No nsaids   Inc fluids     Ten d with lab before   iamni to call urol and get sen  A great deal of time spent reviewing medications, diet, exercise, social issues. Also reviewing the chart before entering the room with patient and finishing charting after leaving patient's room. More than half of that time was spent face to face with the patient in counseling and coordinating care. Follow Up: Return for reg .      Seen by:  Torie Kerns DO

## 2020-08-09 LAB — URINE CULTURE, ROUTINE: NORMAL

## 2020-08-10 ENCOUNTER — TELEPHONE (OUTPATIENT)
Dept: PRIMARY CARE CLINIC | Age: 85
End: 2020-08-10

## 2020-08-13 ENCOUNTER — HOSPITAL ENCOUNTER (OUTPATIENT)
Age: 85
Discharge: HOME | End: 2020-08-15
Payer: MEDICARE

## 2020-08-13 LAB
ALBUMIN SERPL-MCNC: 4.3 G/DL (ref 3.5–5.2)
ALP BLD-CCNC: 90 U/L (ref 35–104)
ALT SERPL-CCNC: 11 U/L (ref 0–32)
ANION GAP SERPL CALCULATED.3IONS-SCNC: 10 MMOL/L (ref 7–16)
AST SERPL-CCNC: 17 U/L (ref 0–31)
BILIRUB SERPL-MCNC: 0.4 MG/DL (ref 0–1.2)
BUN BLDV-MCNC: 35 MG/DL (ref 8–23)
CALCIUM SERPL-MCNC: 9.8 MG/DL (ref 8.6–10.2)
CHLORIDE BLD-SCNC: 102 MMOL/L (ref 98–107)
CO2: 25 MMOL/L (ref 22–29)
CREAT SERPL-MCNC: 1.6 MG/DL (ref 0.5–1)
GFR AFRICAN AMERICAN: 36
GFR NON-AFRICAN AMERICAN: 30 ML/MIN/1.73
GLUCOSE BLD-MCNC: 102 MG/DL (ref 74–99)
POTASSIUM SERPL-SCNC: 5 MMOL/L (ref 3.5–5)
SODIUM BLD-SCNC: 137 MMOL/L (ref 132–146)
TOTAL PROTEIN: 7.3 G/DL (ref 6.4–8.3)

## 2020-08-13 PROCEDURE — 80053 COMPREHEN METABOLIC PANEL: CPT

## 2020-08-13 PROCEDURE — 36415 COLL VENOUS BLD VENIPUNCTURE: CPT

## 2020-08-17 ENCOUNTER — OFFICE VISIT (OUTPATIENT)
Dept: PRIMARY CARE CLINIC | Age: 85
End: 2020-08-17
Payer: MEDICARE

## 2020-08-17 VITALS
DIASTOLIC BLOOD PRESSURE: 68 MMHG | SYSTOLIC BLOOD PRESSURE: 126 MMHG | TEMPERATURE: 98.1 F | BODY MASS INDEX: 28.12 KG/M2 | WEIGHT: 144 LBS

## 2020-08-17 PROBLEM — N18.30 KIDNEY DISEASE, CHRONIC, STAGE III (GFR 30-59 ML/MIN) (HCC): Status: ACTIVE | Noted: 2020-08-17

## 2020-08-17 LAB
BILIRUBIN, POC: NORMAL
BLOOD URINE, POC: NORMAL
CLARITY, POC: CLEAR
COLOR, POC: YELLOW
GLUCOSE URINE, POC: NORMAL
KETONES, POC: NORMAL
LEUKOCYTE EST, POC: NORMAL
NITRITE, POC: NORMAL
PH, POC: 6.5
PROTEIN, POC: NORMAL
SPECIFIC GRAVITY, POC: 1.01
UROBILINOGEN, POC: 0.2

## 2020-08-17 PROCEDURE — G8428 CUR MEDS NOT DOCUMENT: HCPCS | Performed by: FAMILY MEDICINE

## 2020-08-17 PROCEDURE — 81002 URINALYSIS NONAUTO W/O SCOPE: CPT | Performed by: FAMILY MEDICINE

## 2020-08-17 PROCEDURE — 1036F TOBACCO NON-USER: CPT | Performed by: FAMILY MEDICINE

## 2020-08-17 PROCEDURE — 1123F ACP DISCUSS/DSCN MKR DOCD: CPT | Performed by: FAMILY MEDICINE

## 2020-08-17 PROCEDURE — G8417 CALC BMI ABV UP PARAM F/U: HCPCS | Performed by: FAMILY MEDICINE

## 2020-08-17 PROCEDURE — 4040F PNEUMOC VAC/ADMIN/RCVD: CPT | Performed by: FAMILY MEDICINE

## 2020-08-17 PROCEDURE — 1090F PRES/ABSN URINE INCON ASSESS: CPT | Performed by: FAMILY MEDICINE

## 2020-08-17 PROCEDURE — 99214 OFFICE O/P EST MOD 30 MIN: CPT | Performed by: FAMILY MEDICINE

## 2020-08-17 ASSESSMENT — ENCOUNTER SYMPTOMS
RESPIRATORY NEGATIVE: 1
ALLERGIC/IMMUNOLOGIC NEGATIVE: 1
EYES NEGATIVE: 1
GASTROINTESTINAL NEGATIVE: 1

## 2020-08-17 NOTE — H&P
NO ISSUES AT THIS TIME   HER DAUGHTER AND SON ARE WITH HER TODAY   LAST URETEROSCOPY WAS ON 4/17/18 WHICH SHOWED NO RECURRENT DISEASE   URINE CYTOLOGY ON 4/17/18 WAS NEGATIVE   4/4/17: URINE CYTOLOGY WAS NEGATIVE   URINE CYTOLOGY ON 10/25/16 WAS NEGATIVE   URINE CULTURE ON 4/4/17 GREW ENTEROCOCCUS AND PSEUDOMONAS AND WAS TREATED WITH ANTIBIOTICS WHICH SHE FINISHED   2/22/17: COMPLETED 6 WEEKLY BCG TREATMENTS   + NOCTURIA X1   - HEMATURIA   - PAIN   LASIX RENAL SCAN 11/2/16 WAS NORMAL WITH 49% LEFT AND 51% LEFT RENAL FUNCTION   PATHOLOGY FROM LEFT URETERAL TUMOR BIOPSY ON 10/25/16 WAS POSITIVE FOR LOW GRADE PAPILLARY UROTHELIAL CARCINOMA, NON INVASIVE   CT SCAN ON 9/19/16 SHOWED LEFT HYDRONEPHROSIS AND AN ENHANCING LEFT DISTAL URETERAL TUMOR (NO METS)   BUN AND CREATININE ON 10/30/19 WERE 14 AND 0.8, RESPECTIVELY        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 The problem is on the left side. Her cancer was diagnosed 4 years ago. She has had laser ablation to treat her transitional cell carcinoma. She has not received radiation therapy. She did not receive chemotherapy for her cancer. She did not see blood in her urine. She is not having pain in new locations. She does have a good appetite. Her bowels are moving normally. She has not recently had unwanted weight loss. Her last cysto was 2 years ago. Her last radiologic test to evaluate the kidneys was 4 years ago.         ALLERGIES: Enalapril Maleate  Sulfa      MEDICATIONS: Oncovite tablet 2 tablet PO BID   Amlodipine Besilate   Losartan Potassium   Lumigan   Rhopressa   Simvastatin   Timolol Maleate   Vitamin D3        PSH: Bladder Instill AntiCA Agent - 2017, 2017, 2017, 2017, 2017, 2017  Cysto Uretero Biopsy Fulgura, Left, W/ LEFT JJ URETERAL STENT INSERTION - 2016  Cysto Uretero W/excise Tumor, Left, W/URETERAL STENT EXCHANGE - 2016  Cystoscopy Insert Stent - 10/19/2017  Cystoscopy Ureteroscopy, Left - 2018 - at [facility] - [id number], 2017, Left - 2017  Hysterectomy      NON- PSH: None             PMH: Dysuria - 2017  Malignant neoplasm of bladder, unspecified, S/P INTRAVESICAL BC17--17 - 2017  Malignant neoplasm of unspecified ureter, Left, LOW GRADE, NON-INVASIVE UROTHELIAL CARCINOMA - 2016  Hydroureter, Left - 2016  Cyst of kidney, acquired, Right  Family history of malignant neoplasm of kidney, SON HAS BLADDER CANCER  Urinary Tract Inf, Unspec site       NON- PMH: Dvrtclos of lg int w/o perforation or abscess w/o bleeding  Essential (primary) hypertension  Gastro-esophageal reflux disease without esophagitis  Other cerebrovascular disease  Other fracture of unspecified lumbar vertebra, sequela  Other specified glaucoma  Pure hypercholesterolemia, unspecified  Type 2 diabetes mellitus without complications       FAMILY HISTORY: Bladder Cancer - Son  Heart Disease - Runs in Family  Kidney Cancer - Runs in Family     SOCIAL HISTORY: Marital Status:   Preferred Language: English; Ethnicity: Unknown; Race: White  Current Smoking Status: Patient has never smoked. Does not use smokeless tobacco.  Has never drank. Does not use drugs. Drinks 1 caffeinated drink per day. Has not had a blood transfusion. Patient's occupation is/was RETIRED. Notes: FOUR CHILDREN     REVIEW OF SYSTEMS:      Constitutional:  Patient denies fever, chills, and weight loss. Eyes:  Patient denies wearing glasses. Ears, Nose, Mouth, Throat:  Patient denies hearing loss. Cardiovascular:  Patient denies chest pains, swollen ankles, and irregular heartbeat. Respiratory:  Patient denies shortness of breath, wheezing, and chronic cough. Gastrointestinal:  Patient denies abdominal pain, nausea/vomiting, and change in bowels.      Genitourinary:  Patient denies incontinence, painful urination, blood in urine, gerard catheter, and suprapubic catheter. Musculoskeletal:  Patient denies using wheelchair, using walker, and using cane. Integumentary/Skin:  Patient denies rash, persistent itching, and skin cancer history. Neurological:  Patient denies numbness, tingling, dizziness, and altered mental status. Hematologic/Lymphatic:  Patient denies swollen glands, abnormal bleeding, and history blood transfusion. VITAL SIGNS:              Weight 145 lb / 65.77 kg      Height 63 in / 160.02 cm      Pulse 72 /min      Resp. Rate 18 /min      BMI 25.7 kg/m²      MULTI-SYSTEM PHYSICAL EXAMINATION:       Constitutional: Well-nourished. No physical deformities. Normally developed. Good grooming. Neck: Neck symmetrical, not swollen. Normal tracheal position. Respiratory: No labored breathing, no use of accessory muscles. Cardiovascular: Normal temperature, normal extremity pulses, no swelling, no varicosities. Lymphatic: No enlargement of neck, axillae, groin. Skin: No paleness, no jaundice, no cyanosis. No lesion, no ulcer, no rash. Neurologic / Psychiatric: Oriented to time, oriented to place, oriented to person. No depression, no anxiety, no agitation. Gastrointestinal: No mass, no tenderness, no rigidity, non obese abdomen. Eyes: Normal conjunctivae. Normal eyelids. Ears, Nose, Mouth, and Throat: Left ear no scars, no lesions, no masses. Right ear no scars, no lesions, no masses. Nose no scars, no lesions, no masses. Normal hearing. Normal lips. Musculoskeletal: Normal gait and station of head and neck. PAST DATA REVIEWED:     Source Of History:  Patient, Family/Caregiver   Lab Test Review:  Creatinine, Blood Urea Nitrogen (BUN)   Records Review:  Pathology Reports, Previous Patient Records   X-Ray Review: C.T. Abdomen/Pelvis: Reviewed Report.         PROCEDURES: None     ASSESSMENT:       ICD-10 Details   1 :  Malignant neoplasm of unspecified ureter - C66.9 Left, Stable   2  Urinary Tract Inf, Unspec site - N39.0 Improving     PLAN:    Orders   Labs Urine Cytology   Schedule   Return Visit/Planned Activity: Follow Up After Testing   Procedure: Unspecified Date at 3 - [facility]. [place] - [id number] - Cysto Uretero Biopsy Fulgura - [id number], left, RETROGRADE PYELOGRAPHY, POSSIBLE JJ URETERAL STENT INSERTION, LEFT KIDNEY WASHINGS FOR CYTOLOGY   Patient Handouts   Provided Patient Education Sheets    Paper Handout Provided PAT   Document   Letter(s):  Created for General Fredy D.O. Created for Patient: Clinical Summary   Billing Summary:  Created   The patient and I talked at length about etiologies of urinary tract infection. We discussed bacterial and viral UTIs. We discussed the possible relationship between urinary tract infection and bladder outlet obstruction, neurogenic bladder, urethral stenosis, urethral stricture, meatal stenosis, urinary tract stones, congenital urinary tract abnormalities, acquired urinary tract abnormalities, hydronephrosis, ureteral obstruction, and the development of chronic cystitis. Alternative treatment options were discussed with the patient in detail. All questions were answered    Antibiotics, anti-inflammatories, and urinary analgesics were discussed with the patient. The patient gives informed consent to proceed with conservative treatment of their urinary tract infection with urinary tract imaging as indicated. The patient was given instructions to call for abdominal pain, pelvic pain, perirectal pain, nausea, vomiting, diarrhea, fever over 100? ?F, chills, hematuria, dysuria, frequency, urgency, or urge incontinence. Notes:  She feels well today. She does not need additional antibiotics at this time.  I recommended she undergo surveillance cystoscopy with left ureteroscopy and possible biopsy with fulguration as she has had done in the past. She may require additional postoperative intravesical BCG if she has recurrence of her tumor in the left distal ureter.

## 2020-08-17 NOTE — PROGRESS NOTES
20  Name: Collins Coffman    : 3/20/1928    Sex: female    Age: 80 y.o. Subjective:  Chief Complaint: Patient is here for ten days  Ck  Re kidney function     Here iwh daughter    perla diallo ws to [facility] urol and  She faield to do so   Was due fo rpoceudre  And canclled due to covid  Bun better   crea sl worse       Pt states feels  Better    No  Dizzy or light heded feelings   fo rfew weeks  Pt states she took an avil this am and told her last time to not take      Review of Systems   Constitutional: Negative. HENT: Negative. Eyes: Negative. Respiratory: Negative. Cardiovascular: Negative. Gastrointestinal: Negative. Endocrine: Negative. Genitourinary: Negative. Musculoskeletal: Negative. Skin: Negative. Allergic/Immunologic: Negative. Neurological: Negative. Hematological: Negative. Psychiatric/Behavioral: Negative. Current Outpatient Medications:     Handicap Placard MISC, by Does not apply route Dx  Arthritis   5  yrs, Disp: 1 each, Rfl: 0    ammonium lactate (LAC-HYDRIN) 12 % lotion, Apply topically daily. , Disp: 1 Bottle, Rfl: 5    Multiple Vitamins-Minerals (ONCOVITE PO), Take by mouth daily, Disp: , Rfl:     Cholecalciferol (VITAMIN D3) 50 MCG ( UT) CAPS, Take 1 capsule by mouth daily, Disp: 90 capsule, Rfl: 3    amLODIPine (NORVASC) 2.5 MG tablet, Take 1 tablet by mouth every morning, Disp: 90 tablet, Rfl: 12    calcium carbonate (TUMS) 500 MG chewable tablet, Take 1 tablet by mouth daily, Disp: , Rfl:     bimatoprost (LUMIGAN) 0.01 % SOLN ophthalmic drops, Place 1 drop into both eyes nightly, Disp: , Rfl:     dorzolamide-timolol (COSOPT) 22.3-6.8 MG/ML ophthalmic solution, Place 1 drop into both eyes 2 times daily, Disp: , Rfl:     RHOPRESSA 0.02 % SOLN, Apply 1 drop to eye daily Indications: administer one drop in both eyes every morning, Disp: , Rfl:     simvastatin (ZOCOR) 20 MG tablet, Take 1 tablet by mouth nightly, Disp: 90 tablet, Rfl: 5    citalopram (CELEXA) 10 MG tablet, Take 1 tablet by mouth every morning, Disp: 90 tablet, Rfl: 12    losartan (COZAAR) 100 MG tablet, Take 1 tablet by mouth daily, Disp: 90 tablet, Rfl: 5  Allergies   Allergen Reactions    Enalapril Maleate Other (See Comments)     Other reaction(s): cough/rash    Penicillins     Sulfa Antibiotics      Social History     Socioeconomic History    Marital status:      Spouse name: Not on file    Number of children: Not on file    Years of education: Not on file    Highest education level: Not on file   Occupational History    Not on file   Social Needs    Financial resource strain: Not on file    Food insecurity     Worry: Not on file     Inability: Not on file    Transportation needs     Medical: Not on file     Non-medical: Not on file   Tobacco Use    Smoking status: Never Smoker    Smokeless tobacco: Never Used   Substance and Sexual Activity    Alcohol use: Never     Frequency: Never    Drug use: Never    Sexual activity: Never   Lifestyle    Physical activity     Days per week: Not on file     Minutes per session: Not on file    Stress: Not on file   Relationships    Social connections     Talks on phone: Not on file     Gets together: Not on file     Attends Jainism service: Not on file     Active member of club or organization: Not on file     Attends meetings of clubs or organizations: Not on file     Relationship status: Not on file    Intimate partner violence     Fear of current or ex partner: Not on file     Emotionally abused: Not on file     Physically abused: Not on file     Forced sexual activity: Not on file   Other Topics Concern    Not on file   Social History Narrative        Stress Test - 8/7/09 neg.  (Jaimes)    Duplex Carotid Ultasound - 8/5/09 no evidence of significant stenosis    HTN    LIPID    GLAUCOMA    C HYSTER    CVA--    OA L KNEE    DIET DM    [employer] AND WILL LIVE WITH HER Pam Luis  1928 Page #2    LOW VIT B12 AND VIT D 4-10    DAUGHTER WORKS AT [employer] OFFICE    GYN DR Lili Santacruz    US KIDNEY WITH RIGHT RENAL CYST AND SEVERAL RIGHT RENAL STONES    ADMIT WITH GI BLEED----AND EGD WITH ESOPHAGITIS AND POSS MASS BUT BX NEG---    2-15----DR HORN------RE DONE DR WALLER 5-15    ER WITH FALL WITH 20 % COMP FX L-1 AND CT ABD IWTH LEFT HYDRONEPHROSIS AND    URETER WITH NO STONE------GB STONE OR SLUDGE NOTED    CT  WITH DISTAL LEFT URETER MASS---DX WITH URETER CA---- LEFT----DR MAGGI FITZPATRICK-----STENT    SON DX WITH BLADDER CA     Admit  10-19 with GB sx and new cary murmur   Seen by  Renaldo panchal and echo with mod AR      Past Medical History:   Diagnosis Date    Abdominal pain 2015    Acute cholecystitis 10/29/2019    Diet-controlled diabetes mellitus ([address] 2019    Gastritis 2015    Glaucoma     Hematemesis 2015    Hyperlipidemia     Hypertension      Family History   Problem Relation Age of Onset    Early Death Mother     Prostate Cancer Father     Kidney Cancer Sister     Cancer Brother     Alzheimer's Disease Brother     Cancer Sister       Past Surgical History:   Procedure Laterality Date    CYSTOSCOPY  2017    cysto retro left ureteroscopy, stent removal with Dr. Jennifer Berg  2017    stent placement    CYSTOSCOPY  10/19/2017    Left stent placement    HYSTERECTOMY      [place] CYSTOURETHROSCOPY,URETER CATHETER Left 2018    CYSTOSCOPY RETROGRADE PYELOGRAM BLADDER AND LEFT URETERAL WASHINGS INSERTION LEFT URETERAL STENT LEFT URETEROSCOPY performed by Karlene Aguillon MD at [facility] ENDOSCOPY  1/29/15      Vitals:    20 1234   BP: 126/68   Temp: 98.1 °F (36.7 °C)   Weight: 144 lb (65.3 kg)       Objective:    Physical Exam  Vitals signs reviewed. Constitutional:       Appearance: She is well-developed. HENT:      Head: Normocephalic.    Eyes: Pupils: Pupils are equal, round, and reactive to light. Neck:      Musculoskeletal: Normal range of motion. Cardiovascular:      Rate and Rhythm: Normal rate and regular rhythm. Pulmonary:      Effort: Pulmonary effort is normal.      Breath sounds: Normal breath sounds. Abdominal:      Palpations: Abdomen is soft. Musculoskeletal: Normal range of motion. Skin:     General: Skin is warm. Neurological:      Mental Status: She is alert and oriented to person, place, and time. Psychiatric:         Behavior: Behavior normal.         Idris Ho was seen today for discuss labs. Diagnoses and all orders for this visit:    Ureteral carcinoma, left ([facility])  -     CBC Auto Differential; Future  -     Comprehensive Metabolic Panel; Future  -     Urinalysis; Future    Acute cystitis without hematuria  -     POCT Urinalysis no Micro  -     CBC Auto Differential; Future  -     Comprehensive Metabolic Panel; Future  -     Urinalysis; Future    Essential hypertension    Kidney disease, chronic, stage III (GFR 30-59 ml/min) ([facility])        Comments: matt in room  [facility] urol and get seen asap  If unable  Then will need to let meknow and I will cll uroloffice    Inc  Fluids    . Zora Schulz A great deal of time spent reviewing medications, diet, exercise, social issues. Also reviewing the chart before entering the room with patient and finishing charting after leaving patient's room. More than half of that time was spent face to face with the patient in counseling and coordinating care. Follow Up: Return in about 2 months (around 10/17/2020) for Lab Before.      Seen by:  Maria A Damon DO

## 2020-08-17 NOTE — PROGRESS NOTES
Rick 36 PRE-ADMISSION TESTING GENERAL INSTRUCTIONS- [facility]-phone number:[phone]    GENERAL INSTRUCTIONS  [x] Antibacterial Soap shower Night before and/or AM of Surgery  [] Hernán wipe instruction sheet and wipes given. [x] Nothing by mouth after midnight, including gum, candy, mints, or water. [x] You may brush your teeth, gargle, but do NOT swallow water. []Hibiclens shower  the night before and the morning of surgery. Do not use             Hibiclens on your face or head. []No smoking, chewing tobacco, illegal drugs, or alcohol within 24 hours of your surgery. [x] Jewelry, valuables or body piercing's should not be brought to the hospital. All body and/or tongue piercing's must be removed prior to arriving to hospital.  ALL hair pins must be removed. [x] Do not wear makeup, lotions, powders, deodorant. Nail polish as directed by the nurse. [x] Arrange transportation with a responsible adult  to and from the hospital. If you do not have a responsible adult  to transport you, you will need to make arrangements with a medical transportation company (i.e. [employer]. A Uber/taxi/bus is not appropriate unless you are accompanied by a responsible adult ). Arrange for someone to be with you for the remainder of the day and for 24 hours after your procedure due to having had anesthesia. Who will be your  for transportation?_________FAMILY_________   Who will be staying with you for 24 hrs after your procedure?_______FAM___________  [x] Bring insurance card and photo ID.  [] Transfusion Bracelet: Please bring with you to hospital, day of surgery  [] Bring urine specimen day of surgery. Any small container is acceptable. [] Use inhalers the morning of surgery and bring with you to hospital.  [] Bring copy of living will or healthcare power of  papers to be placed in your electronic record.   [] CPAP/BI-PAP: Please bring your machine if you pre-op area if you have concerns about your blood sugar [phone]. [] Use your inhalers the morning of surgery. Bring your emergency inhaler with you day of surgery. [] Follow physician instructions regarding any blood thinners you may be taking. WHAT TO EXPECT:  [x] The day of surgery you will be greeted and checked in by the [employer].  In addition, you will be registered in the [facility] by a Patient Access Representative. Please bring your photo ID and insurance card. A nurse will greet you in accordance to the time you are needed in the pre-op area to prepare you for surgery. Please do not be discouraged if you are not greeted in the order you arrive as there are many variables that are involved in patient preparation. Your patience is greatly appreciated as you wait for your nurse. Please bring in items such as: books, magazines, newspapers, electronics, or any other items  to occupy your time in the waiting area. []  Delays may occur with surgery and staff will make a sincere effort to keep you informed of delays. If any delays occur with your procedure, we apologize ahead of time for your inconvenience as we recognize the value of your time.

## 2020-08-17 NOTE — PROGRESS NOTES
PT DAUGHTER BARBARA CHOSE [facility] AS COVID TESTING SITE 8/18 FROM [id number]. AWARE TO BRING ID AND TO SELF ISOLATE.

## 2020-08-18 ENCOUNTER — HOSPITAL ENCOUNTER (OUTPATIENT)
Age: 85
Discharge: HOME | End: 2020-08-20
Payer: MEDICARE

## 2020-08-18 PROCEDURE — U0003 INFECTIOUS AGENT DETECTION BY NUCLEIC ACID (DNA OR RNA); SEVERE ACUTE RESPIRATORY SYNDROME CORONAVIRUS 2 (SARS-COV-2) (CORONAVIRUS DISEASE [COVID-19]), AMPLIFIED PROBE TECHNIQUE, MAKING USE OF HIGH THROUGHPUT TECHNOLOGIES AS DESCRIBED BY CMS-2020-01-R: HCPCS

## 2020-08-19 ENCOUNTER — ANESTHESIA EVENT (OUTPATIENT)
Dept: OPERATING ROOM | Age: 85
End: 2020-08-19
Payer: MEDICARE

## 2020-08-19 LAB
SARS-COV-2: NOT DETECTED
SOURCE: NORMAL

## 2020-08-19 RX ORDER — CITALOPRAM 10 MG/1
10 TABLET ORAL EVERY MORNING
Qty: 90 TABLET | Refills: 3 | Status: SHIPPED
Start: 2020-08-19 | End: 2021-01-01 | Stop reason: SDUPTHER

## 2020-08-20 ENCOUNTER — ANESTHESIA (OUTPATIENT)
Dept: OPERATING ROOM | Age: 85
End: 2020-08-20
Payer: MEDICARE

## 2020-08-20 ENCOUNTER — HOSPITAL ENCOUNTER (OUTPATIENT)
Age: 85
Setting detail: OBSERVATION
Discharge: HOME | End: 2020-08-21
Attending: UROLOGY | Admitting: UROLOGY
Payer: MEDICARE

## 2020-08-20 VITALS — DIASTOLIC BLOOD PRESSURE: 69 MMHG | OXYGEN SATURATION: 100 % | SYSTOLIC BLOOD PRESSURE: 126 MMHG

## 2020-08-20 DIAGNOSIS — U07.1 COVID-19: Primary | ICD-10-CM

## 2020-08-20 DIAGNOSIS — Z01.818 PRE-OP TESTING: ICD-10-CM

## 2020-08-20 PROBLEM — R31.0 GROSS HEMATURIA: Status: ACTIVE | Noted: 2020-08-20

## 2020-08-20 PROBLEM — E78.5 HYPERLIPEMIA: Chronic | Status: ACTIVE | Noted: 2019-08-20

## 2020-08-20 PROBLEM — D49.59 URETERAL TUMOR: Status: ACTIVE | Noted: 2020-08-20

## 2020-08-20 PROBLEM — E78.5 HYPERLIPEMIA: Status: ACTIVE | Noted: 2019-08-20

## 2020-08-20 PROBLEM — D49.4 BLADDER TUMOR: Status: ACTIVE | Noted: 2020-08-20

## 2020-08-20 LAB — METER GLUCOSE: 109 MG/DL (ref 74–99)

## 2020-08-20 PROCEDURE — 6360000002 HC RX W HCPCS: Performed by: NURSE ANESTHETIST, CERTIFIED REGISTERED

## 2020-08-20 PROCEDURE — G0378 HOSPITAL OBSERVATION PER HR: HCPCS

## 2020-08-20 PROCEDURE — 3600000003 HC SURGERY LEVEL 3 BASE: Performed by: UROLOGY

## 2020-08-20 PROCEDURE — 82962 GLUCOSE BLOOD TEST: CPT

## 2020-08-20 PROCEDURE — 7100000000 HC PACU RECOVERY - FIRST 15 MIN: Performed by: UROLOGY

## 2020-08-20 PROCEDURE — 6360000002 HC RX W HCPCS: Performed by: UROLOGY

## 2020-08-20 PROCEDURE — 3600000013 HC SURGERY LEVEL 3 ADDTL 15MIN: Performed by: UROLOGY

## 2020-08-20 PROCEDURE — 96372 THER/PROPH/DIAG INJ SC/IM: CPT

## 2020-08-20 PROCEDURE — 2580000003 HC RX 258: Performed by: UROLOGY

## 2020-08-20 PROCEDURE — 6370000000 HC RX 637 (ALT 250 FOR IP): Performed by: INTERNAL MEDICINE

## 2020-08-20 PROCEDURE — 2500000003 HC RX 250 WO HCPCS: Performed by: NURSE ANESTHETIST, CERTIFIED REGISTERED

## 2020-08-20 PROCEDURE — 2500000003 HC RX 250 WO HCPCS: Performed by: UROLOGY

## 2020-08-20 PROCEDURE — 3700000001 HC ADD 15 MINUTES (ANESTHESIA): Performed by: UROLOGY

## 2020-08-20 PROCEDURE — 88307 TISSUE EXAM BY PATHOLOGIST: CPT

## 2020-08-20 PROCEDURE — 2709999900 HC NON-CHARGEABLE SUPPLY: Performed by: UROLOGY

## 2020-08-20 PROCEDURE — 3700000000 HC ANESTHESIA ATTENDED CARE: Performed by: UROLOGY

## 2020-08-20 PROCEDURE — 2720000010 HC SURG SUPPLY STERILE: Performed by: UROLOGY

## 2020-08-20 PROCEDURE — 7100000001 HC PACU RECOVERY - ADDTL 15 MIN: Performed by: UROLOGY

## 2020-08-20 PROCEDURE — 88112 CYTOPATH CELL ENHANCE TECH: CPT

## 2020-08-20 RX ORDER — LORAZEPAM 2 MG/ML
0.5 INJECTION INTRAMUSCULAR EVERY 6 HOURS PRN
Status: DISCONTINUED | OUTPATIENT
Start: 2020-08-20 | End: 2020-08-21 | Stop reason: HOSPADM

## 2020-08-20 RX ORDER — ATROPA BELLADONNA AND OPIUM 16.2; 6 MG/1; MG/1
60 SUPPOSITORY RECTAL EVERY 8 HOURS PRN
Status: DISCONTINUED | OUTPATIENT
Start: 2020-08-20 | End: 2020-08-21 | Stop reason: HOSPADM

## 2020-08-20 RX ORDER — PHENAZOPYRIDINE HYDROCHLORIDE 100 MG/1
100 TABLET, FILM COATED ORAL 3 TIMES DAILY PRN
Status: DISCONTINUED | OUTPATIENT
Start: 2020-08-20 | End: 2020-08-21 | Stop reason: HOSPADM

## 2020-08-20 RX ORDER — CIPROFLOXACIN 2 MG/ML
200 INJECTION, SOLUTION INTRAVENOUS EVERY 12 HOURS
Status: DISCONTINUED | OUTPATIENT
Start: 2020-08-21 | End: 2020-08-21 | Stop reason: HOSPADM

## 2020-08-20 RX ORDER — ZOLPIDEM TARTRATE 5 MG/1
5 TABLET ORAL NIGHTLY PRN
Status: DISCONTINUED | OUTPATIENT
Start: 2020-08-20 | End: 2020-08-21 | Stop reason: HOSPADM

## 2020-08-20 RX ORDER — OXYCODONE HYDROCHLORIDE AND ACETAMINOPHEN 5; 325 MG/1; MG/1
2 TABLET ORAL EVERY 4 HOURS PRN
Status: DISCONTINUED | OUTPATIENT
Start: 2020-08-20 | End: 2020-08-21 | Stop reason: HOSPADM

## 2020-08-20 RX ORDER — DORZOLAMIDE HYDROCHLORIDE AND TIMOLOL MALEATE 20; 5 MG/ML; MG/ML
1 SOLUTION/ DROPS OPHTHALMIC 2 TIMES DAILY
Status: DISCONTINUED | OUTPATIENT
Start: 2020-08-20 | End: 2020-08-21 | Stop reason: HOSPADM

## 2020-08-20 RX ORDER — ACETAMINOPHEN 325 MG/1
650 TABLET ORAL EVERY 6 HOURS PRN
Status: DISCONTINUED | OUTPATIENT
Start: 2020-08-20 | End: 2020-08-21 | Stop reason: HOSPADM

## 2020-08-20 RX ORDER — OXYCODONE HYDROCHLORIDE AND ACETAMINOPHEN 5; 325 MG/1; MG/1
1 TABLET ORAL EVERY 4 HOURS PRN
Status: DISCONTINUED | OUTPATIENT
Start: 2020-08-20 | End: 2020-08-21 | Stop reason: HOSPADM

## 2020-08-20 RX ORDER — HEPARIN SODIUM 10000 [USP'U]/ML
5000 INJECTION, SOLUTION INTRAVENOUS; SUBCUTANEOUS 2 TIMES DAILY
Status: DISCONTINUED | OUTPATIENT
Start: 2020-08-20 | End: 2020-08-21 | Stop reason: HOSPADM

## 2020-08-20 RX ORDER — CITALOPRAM 20 MG/1
10 TABLET ORAL EVERY MORNING
Status: DISCONTINUED | OUTPATIENT
Start: 2020-08-21 | End: 2020-08-21 | Stop reason: HOSPADM

## 2020-08-20 RX ORDER — GLYCOPYRROLATE 1 MG/5 ML
SYRINGE (ML) INTRAVENOUS PRN
Status: DISCONTINUED | OUTPATIENT
Start: 2020-08-20 | End: 2020-08-20 | Stop reason: SDUPTHER

## 2020-08-20 RX ORDER — ONDANSETRON 2 MG/ML
4 INJECTION INTRAMUSCULAR; INTRAVENOUS EVERY 6 HOURS PRN
Status: DISCONTINUED | OUTPATIENT
Start: 2020-08-20 | End: 2020-08-21 | Stop reason: HOSPADM

## 2020-08-20 RX ORDER — LOSARTAN POTASSIUM 50 MG/1
100 TABLET ORAL DAILY
Status: DISCONTINUED | OUTPATIENT
Start: 2020-08-20 | End: 2020-08-21 | Stop reason: HOSPADM

## 2020-08-20 RX ORDER — DEXTROSE MONOHYDRATE, SODIUM CHLORIDE, AND POTASSIUM CHLORIDE 50; 1.49; 4.5 G/1000ML; G/1000ML; G/1000ML
INJECTION, SOLUTION INTRAVENOUS CONTINUOUS
Status: DISCONTINUED | OUTPATIENT
Start: 2020-08-20 | End: 2020-08-21 | Stop reason: HOSPADM

## 2020-08-20 RX ORDER — LATANOPROST 50 UG/ML
1 SOLUTION/ DROPS OPHTHALMIC NIGHTLY
Status: DISCONTINUED | OUTPATIENT
Start: 2020-08-20 | End: 2020-08-21 | Stop reason: HOSPADM

## 2020-08-20 RX ORDER — CIPROFLOXACIN 2 MG/ML
200 INJECTION, SOLUTION INTRAVENOUS
Status: COMPLETED | OUTPATIENT
Start: 2020-08-20 | End: 2020-08-20

## 2020-08-20 RX ORDER — PROPOFOL 10 MG/ML
INJECTION, EMULSION INTRAVENOUS CONTINUOUS PRN
Status: DISCONTINUED | OUTPATIENT
Start: 2020-08-20 | End: 2020-08-20 | Stop reason: SDUPTHER

## 2020-08-20 RX ORDER — SODIUM CHLORIDE 9 MG/ML
INJECTION, SOLUTION INTRAVENOUS CONTINUOUS
Status: DISCONTINUED | OUTPATIENT
Start: 2020-08-20 | End: 2020-08-20

## 2020-08-20 RX ORDER — ONDANSETRON 2 MG/ML
INJECTION INTRAMUSCULAR; INTRAVENOUS PRN
Status: DISCONTINUED | OUTPATIENT
Start: 2020-08-20 | End: 2020-08-20 | Stop reason: SDUPTHER

## 2020-08-20 RX ORDER — AMLODIPINE BESYLATE 2.5 MG/1
2.5 TABLET ORAL EVERY MORNING
Status: DISCONTINUED | OUTPATIENT
Start: 2020-08-21 | End: 2020-08-21 | Stop reason: HOSPADM

## 2020-08-20 RX ORDER — ATORVASTATIN CALCIUM 10 MG/1
10 TABLET, FILM COATED ORAL NIGHTLY
Status: DISCONTINUED | OUTPATIENT
Start: 2020-08-20 | End: 2020-08-21 | Stop reason: HOSPADM

## 2020-08-20 RX ADMIN — Medication 0.2 MG: at 13:46

## 2020-08-20 RX ADMIN — LOSARTAN POTASSIUM 100 MG: 50 TABLET, FILM COATED ORAL at 21:22

## 2020-08-20 RX ADMIN — PROPOFOL 50 MCG/KG/MIN: 10 INJECTION, EMULSION INTRAVENOUS at 12:40

## 2020-08-20 RX ADMIN — ONDANSETRON HYDROCHLORIDE 4 MG: 2 INJECTION, SOLUTION INTRAMUSCULAR; INTRAVENOUS at 15:10

## 2020-08-20 RX ADMIN — SODIUM CHLORIDE: 9 INJECTION, SOLUTION INTRAVENOUS at 12:33

## 2020-08-20 RX ADMIN — POTASSIUM CHLORIDE, DEXTROSE MONOHYDRATE AND SODIUM CHLORIDE: 150; 5; 450 INJECTION, SOLUTION INTRAVENOUS at 17:14

## 2020-08-20 RX ADMIN — HEPARIN SODIUM 5000 UNITS: 10000 INJECTION, SOLUTION INTRAVENOUS; SUBCUTANEOUS at 21:22

## 2020-08-20 RX ADMIN — CIPROFLOXACIN 200 MG: 2 INJECTION, SOLUTION INTRAVENOUS at 12:35

## 2020-08-20 RX ADMIN — ATORVASTATIN CALCIUM 10 MG: 10 TABLET, FILM COATED ORAL at 21:22

## 2020-08-20 RX ADMIN — LATANOPROST 1 DROP: 50 SOLUTION OPHTHALMIC at 21:21

## 2020-08-20 ASSESSMENT — PAIN SCALES - GENERAL
PAINLEVEL_OUTOF10: 0

## 2020-08-20 ASSESSMENT — PULMONARY FUNCTION TESTS
PIF_VALUE: 1
PIF_VALUE: 0
PIF_VALUE: 1
PIF_VALUE: 0
PIF_VALUE: 1
PIF_VALUE: 1
PIF_VALUE: 0
PIF_VALUE: 1
PIF_VALUE: 1
PIF_VALUE: 0
PIF_VALUE: 1
PIF_VALUE: 0
PIF_VALUE: 3
PIF_VALUE: 1
PIF_VALUE: 0
PIF_VALUE: 1
PIF_VALUE: 0
PIF_VALUE: 1
PIF_VALUE: 1
PIF_VALUE: 0
PIF_VALUE: 1

## 2020-08-20 ASSESSMENT — PAIN DESCRIPTION - LOCATION: LOCATION: ABDOMEN

## 2020-08-20 ASSESSMENT — PAIN DESCRIPTION - PAIN TYPE: TYPE: SURGICAL PAIN

## 2020-08-20 ASSESSMENT — PAIN - FUNCTIONAL ASSESSMENT: PAIN_FUNCTIONAL_ASSESSMENT: 0-10

## 2020-08-20 NOTE — ANESTHESIA PROCEDURE NOTES
Spinal Block    Patient location during procedure: OR  Start time: 8/20/2020 1:18 PM  Reason for block: primary anesthetic and at surgeon's request  Staffing  Anesthesiologist: Saurabh Garcia MD  Performed: anesthesiologist   Preanesthetic Checklist  Completed: patient identified, site marked, surgical consent, pre-op evaluation, timeout performed, IV checked, risks and benefits discussed, monitors and equipment checked, anesthesia consent given, oxygen available and patient being monitored  Spinal Block  Patient position: sitting  Prep: ChloraPrep  Patient monitoring: cardiac monitor, continuous pulse ox, continuous capnometry and frequent blood pressure checks  Approach: midline  Location: L3/L4  Provider prep: mask, sterile gloves and sterile gown  Local infiltration: lidocaine  Agent: bupivacaine  Dose: 1.6  Dose: 1.6  Needle  Needle type: Pencan   Needle gauge: 25 G  Needle length: 3.5 in  Assessment  Sensory level: T6  Swirl obtained: Yes  CSF: clear  Attempts: 1  Hemodynamics: stable

## 2020-08-20 NOTE — H&P
W/URETERAL STENT EXCHANGE - 2016  Cystoscopy Insert Stent - 10/19/2017  Cystoscopy Ureteroscopy, Left - 2018 - at 4 - [address] [facility]. [place]ris Duel - [address], 2017, Left - 2017  Hysterectomy       NON- PSH: None                PMH: Dysuria - 2017  Malignant neoplasm of bladder, unspecified, S/P INTRAVESICAL BC17--17 - 2017  Malignant neoplasm of unspecified ureter, Left, LOW GRADE, NON-INVASIVE UROTHELIAL CARCINOMA - 2016  Hydroureter, Left - 2016  Cyst of kidney, acquired, Right  Family history of malignant neoplasm of kidney, SON HAS BLADDER CANCER  Urinary Tract Inf, Unspec site        NON- PMH: Dvrtclos of lg int w/o perforation or abscess w/o bleeding  Essential (primary) hypertension  Gastro-esophageal reflux disease without esophagitis  Other cerebrovascular disease  Other fracture of unspecified lumbar vertebra, sequela  Other specified glaucoma  Pure hypercholesterolemia, unspecified  Type 2 diabetes mellitus without complications        FAMILY HISTORY: Bladder Cancer - Son  Heart Disease - Runs in Family  Kidney Cancer - Runs in Family     SOCIAL HISTORY: Marital Status:   Preferred Language: English; Ethnicity: Unknown; Race: White  Current Smoking Status: Patient has never smoked. Does not use smokeless tobacco.  Has never drank. Does not use drugs. Drinks 1 caffeinated drink per day. Has not had a blood transfusion. Patient's occupation is/was RETIRED.         Notes: FOUR CHILDREN     REVIEW OF SYSTEMS:       Constitutional:  Patient denies fever, chills, and weight loss. Eyes:  Patient denies wearing glasses. Ears, Nose, Mouth, Throat:  Patient denies hearing loss. Cardiovascular:  Patient denies chest pains, swollen ankles, and irregular heartbeat. Respiratory:  Patient denies shortness of breath, wheezing, and chronic cough. Gastrointestinal:  Patient denies abdominal pain, nausea/vomiting, and change in bowels.      Genitourinary:  Patient denies incontinence, painful urination, blood in urine, gerard catheter, and suprapubic catheter. Musculoskeletal:  Patient denies using wheelchair, using walker, and using cane. Integumentary/Skin:  Patient denies rash, persistent itching, and skin cancer history. Neurological:  Patient denies numbness, tingling, dizziness, and altered mental status. Hematologic/Lymphatic:  Patient denies swollen glands, abnormal bleeding, and history blood transfusion. VITAL SIGNS:                   Weight 145 lb / 65.77 kg      Height 63 in / 160.02 cm      Pulse 72 /min      Resp. Rate 18 /min      BMI 25.7 kg/m²      MULTI-SYSTEM PHYSICAL EXAMINATION:        Constitutional: Well-nourished. No physical deformities. Normally developed. Good grooming. Neck: Neck symmetrical, not swollen. Normal tracheal position. Respiratory: No labored breathing, no use of accessory muscles. Cardiovascular: Normal temperature, normal extremity pulses, no swelling, no varicosities. Lymphatic: No enlargement of neck, axillae, groin. Skin: No paleness, no jaundice, no cyanosis. No lesion, no ulcer, no rash. Neurologic / Psychiatric: Oriented to time, oriented to place, oriented to person. No depression, no anxiety, no agitation. Gastrointestinal: No mass, no tenderness, no rigidity, non obese abdomen. Eyes: Normal conjunctivae. Normal eyelids. Ears, Nose, Mouth, and Throat: Left ear no scars, no lesions, no masses. Right ear no scars, no lesions, no masses. Nose no scars, no lesions, no masses. Normal hearing. Normal lips. Musculoskeletal: Normal gait and station of head and neck.                  PAST DATA REVIEWED:      Source Of History:  Patient, Family/Caregiver   Lab Test Review:  Creatinine, Blood Urea Nitrogen (BUN)   Records Review:  Pathology Reports, Previous Patient Records   X-Ray Review: C.T.  Abdomen/Pelvis: Reviewed Report.          PROCEDURES: None      ASSESSMENT:        ICD-10 Details   1 :  Malignant neoplasm of unspecified ureter - C66.9 Left, Stable   2   Urinary Tract Inf, Unspec site - N39.0 Improving      PLAN:     Orders   Labs Urine Cytology   Schedule   Return Visit/Planned Activity: Follow Up After Testing   Procedure: Unspecified Date at 3 - [address] [facility]. Anirudh Cousin - [address] - Cysto Uretero Biopsy [place] - [address], left, RETROGRADE PYELOGRAPHY, POSSIBLE JJ URETERAL STENT INSERTION, LEFT KIDNEY WASHINGS FOR CYTOLOGY   Patient Handouts       Provided Patient Education Sheets     Paper Handout Provided PAT   Document   Letter(s):  Created for [facility] LIZ SARAH     Created for Patient: Clinical Summary   Billing Summary:  Created   The patient and I talked at length about etiologies of urinary tract infection. We discussed bacterial and viral UTIs. We discussed the possible relationship between urinary tract infection and bladder outlet obstruction, neurogenic bladder, urethral stenosis, urethral stricture, meatal stenosis, urinary tract stones, congenital urinary tract abnormalities, acquired urinary tract abnormalities, hydronephrosis, ureteral obstruction, and the development of chronic cystitis. Alternative treatment options were discussed with the patient in detail. All questions were answered    Antibiotics, anti-inflammatories, and urinary analgesics were discussed with the patient. The patient gives informed consent to proceed with conservative treatment of their urinary tract infection with urinary tract imaging as indicated. The patient was given instructions to call for abdominal pain, pelvic pain, perirectal pain, nausea, vomiting, diarrhea, fever over 100? ?F, chills, hematuria, dysuria, frequency, urgency, or urge incontinence.        Notes:  She feels well today. She does not need additional antibiotics at this time.  I recommended she undergo surveillance cystoscopy with left ureteroscopy and possible biopsy with fulguration as she

## 2020-08-20 NOTE — ANESTHESIA POSTPROCEDURE EVALUATION
Department of Anesthesiology  Postprocedure Note    Patient: Bonifacio Jackson  MRN: [id number]  YOB: 1928  Date of evaluation: 8/21/2020  Time:  6:53 AM     Procedure Summary     Date:  08/20/20 Room / Location:  [facility] OR 11 / [facility]    Anesthesia Start:  5467 Anesthesia Stop:      Procedure:  CYSTOSCOPY, URETERO BIOPSY WITH FULGURATION, LEFT  RETROGRADE PYELOGRAM POSSIBLE URETERAL STENT INSERTION, LEFT KIDNEY WASHINGS FOR CYTOLOGY (Left ) Diagnosis:  (NEOPLASM OF URETER)    Surgeon:  Rosibel Morillo MD Responsible Provider:      Anesthesia Type:  MAC, spinal ASA Status:  2          Anesthesia Type: MAC, spinal    Johnna Phase I: Johnna Score: 10    Johnna Phase II:      Last vitals: Reviewed and per EMR flowsheets.        Anesthesia Post Evaluation    Patient location during evaluation: PACU  Patient participation: complete - patient participated  Level of consciousness: awake  Pain score: 3  Airway patency: patent  Nausea & Vomiting: no nausea and no vomiting  Complications: no  Cardiovascular status: blood pressure returned to baseline  Respiratory status: acceptable  Hydration status: euvolemic

## 2020-08-20 NOTE — PROGRESS NOTES
Patient admitted to PACU and placed on appropriate monitors. Patient on 40% face mask. Airway patent at this time. Report obtained from CRNA. Warm blankets applied. Patient had spinal anesthesia but is wiggling toes.

## 2020-08-20 NOTE — ANESTHESIA PRE PROCEDURE
Medications   Medication Dose Route Frequency Provider Last Rate Last Dose    0.9 % sodium chloride infusion   Intravenous Continuous Rosa Maria Sheikh MD        ciprofloxacin (CIPRO) IVPB 200 mg  200 mg Intravenous On Call to [facility], MD           Allergies:     Allergies   Allergen Reactions    Enalapril Maleate Other (See Comments)     Other reaction(s): cough/rash    Penicillins     Sulfa Antibiotics        Problem List:    Patient Active Problem List   Diagnosis Code    Glaucoma H40.9    Gastric mass K31.89    Localized osteoarthritis of right knee M17.11    Essential hypertension I10    Mixed hyperlipidemia E78.2    Gastroesophageal reflux disease without esophagitis K21.9    Anxiety F41.9    Nonrheumatic aortic valve stenosis I35.0    Nonrheumatic aortic valve insufficiency I35.1    Cholelithiasis K80.20    Ureteral carcinoma, 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 Nahed Mayer  07/18/2017    stent placement    CYSTOSCOPY  10/19/2017    Left stent placement    HYSTERECTOMY      RI CYSTOURETHROSCOPY,URETER CATHETER Left 4/17/2018    CYSTOSCOPY RETROGRADE PYELOGRAM BLADDER AND LEFT URETERAL WASHINGS INSERTION LEFT URETERAL STENT LEFT URETEROSCOPY performed by Bridgett Closs, MD at [facility] ENDOSCOPY  1/29/15       Social History:    Social History     Tobacco Use    Smoking status: Never Smoker    Smokeless tobacco: Never Used   Substance Use Topics    Alcohol use: Never Frequency: Never                                Counseling given: Not Answered      Vital Signs (Current):   Vitals:    08/20/20 1104   BP: (!) 181/81   Pulse: 62   Resp: 20   Temp: 97.6 °F (36.4 °C)   TempSrc: Temporal   SpO2: 96%   Weight: 144 lb (65.3 kg)   Height: 5' (1.524 m)                                              BP Readings from Last 3 Encounters:   08/20/20 (!) 181/81   08/17/20 126/68   08/06/20 (!) 142/78       NPO Status: Time of last liquid consumption: 0800                        Time of last solid consumption: 1800                        Date of last liquid consumption: 08/20/20                        Date of last solid food consumption: 08/19/20    BMI:   Wt Readings from Last 3 Encounters:   08/20/20 144 lb (65.3 kg)   08/17/20 144 lb (65.3 kg)   08/06/20 141 lb (64 kg)     Body mass index is 28.12 kg/m². CBC:   Lab Results   Component Value Date    WBC 6.4 08/04/2020    RBC 4.45 08/04/2020    HGB 13.4 08/04/2020    HCT 43.5 08/04/2020    MCV 97.8 08/04/2020    RDW 13.2 08/04/2020     08/04/2020       CMP:   Lab Results   Component Value Date     08/13/2020    K 5.0 08/13/2020    K 3.9 10/31/2019     08/13/2020    CO2 25 08/13/2020    BUN 35 08/13/2020    CREATININE 1.6 08/13/2020    GFRAA 36 08/13/2020    LABGLOM 30 08/13/2020    GLUCOSE 102 08/13/2020    PROT 7.3 08/13/2020    CALCIUM 9.8 08/13/2020    BILITOT 0.4 08/13/2020    ALKPHOS 90 08/13/2020    AST 17 08/13/2020    ALT 11 08/13/2020       POC Tests: No results for input(s): POCGLU, POCNA, POCK, POCCL, POCBUN, POCHEMO, POCHCT in the last 72 hours.     Coags:   Lab Results   Component Value Date    PROTIME 11.2 01/29/2015    INR 1.1 01/29/2015    APTT 24.5 01/29/2015       HCG (If Applicable): No results found for: PREGTESTUR, PREGSERUM, HCG, HCGQUANT     ABGs: No results found for: PHART, PO2ART, FCZ4KDF, XTY2TLK, BEART, L7EGOBWI     Type & Screen (If Applicable):  No results found for: LABABO, [address]    Anesthesia Evaluation  Patient summary reviewed no history of anesthetic complications:   Airway: Mallampati: II  TM distance: >3 FB   Neck ROM: full  Mouth opening: > = 3 FB Dental:    (+) edentulous      Pulmonary:Negative Pulmonary ROS breath sounds clear to auscultation                             Cardiovascular:    (+) hypertension:, hyperlipidemia        Rhythm: regular  Rate: normal                    Neuro/Psych:   Negative Neuro/Psych ROS              GI/Hepatic/Renal: Neg GI/Hepatic/Renal ROS  (+) GERD:,           Endo/Other: Negative Endo/Other ROS   (+) Diabetes, . Abdominal:           Vascular:                                        Anesthesia Plan      MAC and spinal     ASA 2     (Spoke with daughter and patient, preference is [facility]. If pt too uncomfortable will attempt spinal. )  Induction: intravenous. MIPS: Prophylactic antiemetics administered. Anesthetic plan and risks discussed with patient. Plan discussed with CRNA.                 David Urbano MD   8/20/2020 no

## 2020-08-20 NOTE — BRIEF OP NOTE
Brief Postoperative Note      Patient: Lawanda Beyer  YOB: 1928  MRN: [id number]    Date of Procedure: 8/20/2020    Pre-Op Diagnosis: NEOPLASM OF left ureter    Post-Op Diagnosis: Extensive bladder tumor recurrence       Procedure(s):  CYSTOSCOPY, TRANSURETHRAL RESECTION BLADDER TUMOR    Surgeon(s):  Lyn Carey MD    Assistant:  * No surgical staff found *    Anesthesia: Spinal    Estimated Blood Loss (mL): less than 243     Complications: None    Specimens:   ID Type Source Tests Collected by Time Destination   A : URINE FOR CYTOLOGY Urine Urine, Cystoscopic CYTOLOGY, NON-GYN Lyn Carey MD 8/20/2020 1255    B : BLADDER TUMOR Tissue Tissue SURGICAL PATHOLOGY Lyn Carey MD 8/20/2020 1345        Implants:  * No implants in log *      Drains:   Urethral Catheter Triple-lumen 22 fr (Active)       Findings: Extensive, recurrent tumor throughout the bladder.   Ureteral orifice's not visualized    Electronically signed by Lyn Carey MD on 8/20/2020 at 3:31 PM

## 2020-08-20 NOTE — OP NOTE
and sent to pathology for analysis. She was not able to tolerate this. The anesthesia staff then performed a spinal anesthetic. She was then reprepped and draped again. Inspection of the bladder showed extensive tumor recurrence at the left ureteral orifice and trigone as well as along the posterior bladder wall dome and on the lateral bladder walls as well. An extensive amount of the bladder was replaced by superficial appearing tumor. The cystoscope was removed. A 27 Jamaican continuous-flow resectoscope sheath with a Karyn  was passed per urethra into the bladder. A 12 degree lens plasma loop resectoscope was inserted. Sterile saline irrigation was utilized for irrigation. Transurethral resection of the bladder tumor nests was performed. Each 1 was individually resected without perforating the bladder. This was done until all macroscopic tumor was essentially removed. The tumor was evacuated from the bladder with the piston syringe and sent to pathology for analysis. The tumor around the left trigone was resected down to the base but I was not able to visualize the ureter or ureteral orifice on the left or the right side. I did not do retrograde studies. The resectoscope was removed. A monopolar 27 Jamaican continuous-flow resectoscope was inserted. A rollerball VaporTrode was then inserted. All of the tumor bed and surrounding urothelium was then cauterized with the rollerball until there is meticulous hemostasis. Again the bladder was inspected and there was no perforation. The resectoscope was removed. The bladder was drained with a 22 [employer] Sveta three-way Simplastic Webb. This irrigated freely and the urine was light pink in color. The balloon was inflated with 30 cc of sterile water and this was placed to gravity drainage. She was maintained on CBI and was taken to the PACU in stable condition. She tolerated the procedure well. She will be admitted overnight.   Her catheter will be removed early next week at home. A CT scan will be performed tomorrow to check for extensiveness of disease and potential metastatic lesions. If this is present, it is unlikely the family will pursue anything other than palliative care. We have discussed the possibility of a nephrostomy tube on the left if there is a high-grade obstruction which they will think about. If it does not show metastatic spread, she will be taken back to the operating room in roughly 2 to 3 weeks to undergo second look cystoscopy with attempt at  Left ureteroscopy and retrograde studies and possible left ureteroscopy with tumor ablation if possible.     Bernie Carmona MD  8/20/2020  3:40 PM

## 2020-08-20 NOTE — PROGRESS NOTES
Admitted to Same Day Surgery Unit. Preop instructions given to patient & family. COVID Test done: Yes    Results: Not detected     Self-quarantine guidelines followed since tested? Yes    Any unusual S/S or concerns expressed or observed?   None

## 2020-08-21 ENCOUNTER — APPOINTMENT (OUTPATIENT)
Dept: CT IMAGING | Age: 85
End: 2020-08-21
Attending: UROLOGY
Payer: MEDICARE

## 2020-08-21 VITALS
WEIGHT: 144 LBS | SYSTOLIC BLOOD PRESSURE: 119 MMHG | HEART RATE: 72 BPM | BODY MASS INDEX: 28.27 KG/M2 | OXYGEN SATURATION: 96 % | HEIGHT: 60 IN | RESPIRATION RATE: 18 BRPM | DIASTOLIC BLOOD PRESSURE: 62 MMHG | TEMPERATURE: 98 F

## 2020-08-21 LAB
ANION GAP SERPL CALCULATED.3IONS-SCNC: 12 MMOL/L (ref 7–16)
BUN BLDV-MCNC: 30 MG/DL (ref 8–23)
CALCIUM SERPL-MCNC: 8.7 MG/DL (ref 8.6–10.2)
CHLORIDE BLD-SCNC: 105 MMOL/L (ref 98–107)
CO2: 20 MMOL/L (ref 22–29)
CREAT SERPL-MCNC: 1.4 MG/DL (ref 0.5–1)
GFR AFRICAN AMERICAN: 43
GFR NON-AFRICAN AMERICAN: 35 ML/MIN/1.73
GLUCOSE BLD-MCNC: 119 MG/DL (ref 74–99)
HCT VFR BLD CALC: 36.5 % (ref 34–48)
HEMOGLOBIN: 11.6 G/DL (ref 11.5–15.5)
POTASSIUM SERPL-SCNC: 4.5 MMOL/L (ref 3.5–5)
SODIUM BLD-SCNC: 137 MMOL/L (ref 132–146)

## 2020-08-21 PROCEDURE — G0378 HOSPITAL OBSERVATION PER HR: HCPCS

## 2020-08-21 PROCEDURE — 36415 COLL VENOUS BLD VENIPUNCTURE: CPT

## 2020-08-21 PROCEDURE — 85014 HEMATOCRIT: CPT

## 2020-08-21 PROCEDURE — 80048 BASIC METABOLIC PNL TOTAL CA: CPT

## 2020-08-21 PROCEDURE — 74176 CT ABD & PELVIS W/O CONTRAST: CPT

## 2020-08-21 PROCEDURE — 6360000002 HC RX W HCPCS: Performed by: UROLOGY

## 2020-08-21 PROCEDURE — 96372 THER/PROPH/DIAG INJ SC/IM: CPT

## 2020-08-21 PROCEDURE — 6370000000 HC RX 637 (ALT 250 FOR IP): Performed by: INTERNAL MEDICINE

## 2020-08-21 PROCEDURE — 85018 HEMOGLOBIN: CPT

## 2020-08-21 RX ADMIN — LOSARTAN POTASSIUM 100 MG: 50 TABLET, FILM COATED ORAL at 08:17

## 2020-08-21 RX ADMIN — AMLODIPINE BESYLATE 2.5 MG: 2.5 TABLET ORAL at 08:17

## 2020-08-21 RX ADMIN — HEPARIN SODIUM 5000 UNITS: 10000 INJECTION, SOLUTION INTRAVENOUS; SUBCUTANEOUS at 08:18

## 2020-08-21 RX ADMIN — CIPROFLOXACIN 200 MG: 2 INJECTION, SOLUTION INTRAVENOUS at 00:27

## 2020-08-21 RX ADMIN — CITALOPRAM 10 MG: 20 TABLET, FILM COATED ORAL at 08:17

## 2020-08-21 ASSESSMENT — PAIN SCALES - GENERAL: PAINLEVEL_OUTOF10: 0

## 2020-08-21 NOTE — PROGRESS NOTES
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 She is not in any pain. She did sleep some. She denies any catheter discomfort. She hopes to go home today    Vitals:    08/21/20 0430   BP: (!) 111/56   Pulse: 65   Resp: 16   Temp: 98.2 °F (36.8 °C)   SpO2: 95%       Allergies: Enalapril maleate; Penicillins; and Sulfa antibiotics    PAST MEDICAL HISTORY:   Past Medical History:   Diagnosis Date    Abdominal pain 1/29/2015    Acute cholecystitis 10/29/2019    Diet-controlled diabetes mellitus ([address] 8/20/2019    Gastritis 1/30/2015    Glaucoma     Hematemesis 1/29/2015    Hyperlipidemia     Hypertension        PAST SURGICAL HISTORY:   Past Surgical History:   Procedure Laterality Date    CYSTOSCOPY  04/04/2017    cysto retro left ureteroscopy, stent removal with Dr. Franchesca Claros  07/18/2017    stent placement    CYSTOSCOPY  10/19/2017    Left stent placement    HYSTERECTOMY      DC CYSTOURETHROSCOPY,URETER CATHETER Left 4/17/2018    CYSTOSCOPY RETROGRADE PYELOGRAM BLADDER AND LEFT URETERAL WASHINGS INSERTION LEFT URETERAL STENT LEFT URETEROSCOPY performed by Bernie Carmona MD at [address] [place] [address] ENDOSCOPY  1/29/15        PAST FAMILY HISTORY:    Family History   Problem Relation Age of Onset    Early Death Mother     Prostate Cancer Father     Kidney Cancer Sister     Cancer Brother     Alzheimer's Disease Brother     Cancer Sister        PAST SOCIAL HISTORY:    Social History     Socioeconomic History    Marital status:       Spouse name: None    Number of children: None    Years of education: None    Highest education level: None   Occupational History    None   Social Needs    Financial resource strain: None    Food insecurity     Worry: None     Inability: None    Transportation needs     Medical: None     Non-medical: None   Tobacco Use  Smoking status: Never Smoker    Smokeless tobacco: Never Used   Substance and Sexual Activity    Alcohol use: Never     Frequency: Never    Drug use: Never    Sexual activity: Never   Lifestyle    Physical activity     Days per week: None     Minutes per session: None    Stress: None   Relationships    Social connections     Talks on phone: None     Gets together: None     Attends Voodoo service: None     Active member of club or organization: None     Attends meetings of clubs or organizations: None     Relationship status: None    Intimate partner violence     Fear of current or ex partner: None     Emotionally abused: None     Physically abused: None     Forced sexual activity: None   Other Topics Concern    None   Social History Narrative        Stress Test - 09 neg.  (Jaimes)    Duplex Carotid Ultasound - 09 no evidence of significant stenosis    HTN    LIPID    GLAUCOMA    C HYSTER    CVA--    OA L KNEE    DIET DM    SON SHAHZAD HINES AND WILL LIVE WITH HER    Pam Luis  1928 Page #2    LOW VIT B12 AND VIT D 4-10    DAUGHTER WORKS AT DR [employer] OFFICE    GYN DR Jennifer Stern    US KIDNEY WITH RIGHT RENAL CYST AND SEVERAL RIGHT RENAL STONES    ADMIT WITH GI BLEED----AND EGD WITH ESOPHAGITIS AND POSS MASS BUT BX NEG---    -15----DR HORN------RE DONE DR WALLER 5-15    ER WITH FALL WITH 20 % COMP FX L-1 AND CT ABD IWTH LEFT HYDRONEPHROSIS AND    URETER WITH NO STONE------GB STONE OR SLUDGE NOTED    CT  WITH DISTAL LEFT URETER MASS---DX WITH URETER CA---- LEFT----DR MAGGI FITZPATRICK-----STENT    SON DX WITH BLADDER CA     Admit  10-19 with GB sx and new cary murmur   Seen by  Afshan panchal and echo with mod AR       IV:    dextrose 5% and 0.45% NaCl with KCl 20 mEq 75 mL/hr at 20 1714       PRN: zolpidem, opium-belladonna, HYDROmorphone, oxyCODONE-acetaminophen **OR** oxyCODONE-acetaminophen, acetaminophen, ondansetron, phenazopyridine, LORazepam    Scheduled:    ciprofloxacin  200 mg Intravenous Q12H    heparin (porcine)  5,000 Units Subcutaneous BID    amLODIPine  2.5 mg Oral QAM    latanoprost  1 drop Both Eyes Nightly    citalopram  10 mg Oral QAM    dorzolamide-timolol  1 drop Both Eyes BID    losartan  100 mg Oral Daily    Netarsudil Dimesylate  1 drop Ophthalmic Daily    atorvastatin  10 mg Oral Nightly       Lab Results   Component Value Date     08/13/2020    K 5.0 08/13/2020    K 3.9 10/31/2019    BUN 35 08/13/2020    CREATININE 1.6 08/13/2020        Lab Results   Component Value Date    HGB 13.4 08/04/2020    HCT 43.5 08/04/2020       Review Of Systems:  Constitutional: Tired  Eyes: negative  Ears, nose, mouth, throat, and face: negative  Respiratory: negative  Cardiovascular: Hypertension  Gastrointestinal: negative  Genitourinary: Bladder cancer  Musculoskeletal: negative  Neurological: negative  Behavioral/Psych: Confusion  Endocrine: negative    Physical Exam:  Skin dry, without rashes  Respirations non-labored, intact  Abdomen soft, non-tender, non-distended. Active bowel sounds. Alert and oriented x3  Webb draining clear, watermelon colored urine. Minimal CBI    Assessment and Plan:  POD#1--S/P Cysto/TURBT  -Urine cytology and bladder tumor pathology are pending. She had extensive recurrence of disease within the urinary bladder lumen as well as likely within the left ureter. CT scan today to assess for extensiveness of disease. Palliative care will be considered should she have evidence of metastasis at this time. If not, a second look cystoscopy and resection of any residual bladder tumor will be scheduled as an outpatient in several weeks  -Stop CBI.   Irrigate the Webb manually to maintain patency.  -Webb will be kept in place over the weekend and removed as an outpatient early next week  -Finish empiric antibiotics  -Increase diet and activity level  -DVT prophylaxis  -Home today after the CT scan has been performed      Mathieu Guillen MD  8/21/2020  6:04 AM Less than 1 cm

## 2020-08-21 NOTE — CONSULTS
Consulted for medical management, this patient follows with PCP Dr. Mary Dickinson.  Care will be transitioned to Dr. Dar Jones    Electronically signed by Gio Quesada DO on 8/21/2020 at 10:11 AM

## 2020-08-21 NOTE — PROGRESS NOTES
Webb catheter education went over with patient and family member in the room. Leg bag and overnight bag sent home with patient.

## 2020-08-21 NOTE — CARE COORDINATION
Pt was discharged home with no needs before SW could see the Pt.    Bartolo Valdes, L.S.W.  [phone]

## 2020-08-24 ENCOUNTER — APPOINTMENT (OUTPATIENT)
Dept: CT IMAGING | Age: 85
DRG: 982 | End: 2020-08-24
Payer: MEDICARE

## 2020-08-24 ENCOUNTER — OFFICE VISIT (OUTPATIENT)
Dept: PRIMARY CARE CLINIC | Age: 85
End: 2020-08-24
Payer: MEDICARE

## 2020-08-24 ENCOUNTER — TELEPHONE (OUTPATIENT)
Dept: PRIMARY CARE CLINIC | Age: 85
End: 2020-08-24

## 2020-08-24 ENCOUNTER — HOSPITAL ENCOUNTER (INPATIENT)
Age: 85
LOS: 4 days | Discharge: HOME HEALTH | DRG: 982 | End: 2020-08-28
Attending: EMERGENCY MEDICINE | Admitting: INTERNAL MEDICINE
Payer: MEDICARE

## 2020-08-24 ENCOUNTER — APPOINTMENT (OUTPATIENT)
Dept: GENERAL RADIOLOGY | Age: 85
DRG: 982 | End: 2020-08-24
Payer: MEDICARE

## 2020-08-24 VITALS
BODY MASS INDEX: 27.73 KG/M2 | DIASTOLIC BLOOD PRESSURE: 82 MMHG | HEART RATE: 72 BPM | SYSTOLIC BLOOD PRESSURE: 128 MMHG | WEIGHT: 142 LBS | TEMPERATURE: 98.6 F | OXYGEN SATURATION: 98 %

## 2020-08-24 PROBLEM — R10.9 ABDOMINAL PAIN: Status: ACTIVE | Noted: 2020-08-24

## 2020-08-24 LAB
ALBUMIN SERPL-MCNC: 3.9 G/DL (ref 3.5–5.2)
ALP BLD-CCNC: 91 U/L (ref 35–104)
ALT SERPL-CCNC: 13 U/L (ref 0–32)
ANION GAP SERPL CALCULATED.3IONS-SCNC: 15 MMOL/L (ref 7–16)
AST SERPL-CCNC: 21 U/L (ref 0–31)
BACTERIA: ABNORMAL /HPF
BASOPHILS ABSOLUTE: 0.03 E9/L (ref 0–0.2)
BASOPHILS RELATIVE PERCENT: 0.1 % (ref 0–2)
BILIRUB SERPL-MCNC: 0.7 MG/DL (ref 0–1.2)
BILIRUBIN URINE: NEGATIVE
BLOOD, URINE: ABNORMAL
BUN BLDV-MCNC: 24 MG/DL (ref 8–23)
CALCIUM SERPL-MCNC: 9.4 MG/DL (ref 8.6–10.2)
CHLORIDE BLD-SCNC: 96 MMOL/L (ref 98–107)
CLARITY: ABNORMAL
CO2: 20 MMOL/L (ref 22–29)
COLOR: ABNORMAL
CREAT SERPL-MCNC: 1.6 MG/DL (ref 0.5–1)
EOSINOPHILS ABSOLUTE: 0.01 E9/L (ref 0.05–0.5)
EOSINOPHILS RELATIVE PERCENT: 0 % (ref 0–6)
GFR AFRICAN AMERICAN: 36
GFR NON-AFRICAN AMERICAN: 30 ML/MIN/1.73
GLUCOSE BLD-MCNC: 147 MG/DL (ref 74–99)
GLUCOSE URINE: NEGATIVE MG/DL
HCT VFR BLD CALC: 39 % (ref 34–48)
HEMOGLOBIN: 12.8 G/DL (ref 11.5–15.5)
IMMATURE GRANULOCYTES #: 0.15 E9/L
IMMATURE GRANULOCYTES %: 0.6 % (ref 0–5)
KETONES, URINE: ABNORMAL MG/DL
LACTIC ACID: 1.7 MMOL/L (ref 0.5–2.2)
LEUKOCYTE ESTERASE, URINE: ABNORMAL
LIPASE: 20 U/L (ref 13–60)
LYMPHOCYTES ABSOLUTE: 1.34 E9/L (ref 1.5–4)
LYMPHOCYTES RELATIVE PERCENT: 5.8 % (ref 20–42)
MCH RBC QN AUTO: 30.1 PG (ref 26–35)
MCHC RBC AUTO-ENTMCNC: 32.8 % (ref 32–34.5)
MCV RBC AUTO: 91.8 FL (ref 80–99.9)
MONOCYTES ABSOLUTE: 1.45 E9/L (ref 0.1–0.95)
MONOCYTES RELATIVE PERCENT: 6.3 % (ref 2–12)
NEUTROPHILS ABSOLUTE: 20.18 E9/L (ref 1.8–7.3)
NEUTROPHILS RELATIVE PERCENT: 87.2 % (ref 43–80)
NITRITE, URINE: POSITIVE
PDW BLD-RTO: 13.3 FL (ref 11.5–15)
PH UA: 6.5 (ref 5–9)
PLATELET # BLD: 203 E9/L (ref 130–450)
PMV BLD AUTO: 10.3 FL (ref 7–12)
POTASSIUM SERPL-SCNC: 4.5 MMOL/L (ref 3.5–5)
PROTEIN UA: >=300 MG/DL
RBC # BLD: 4.25 E12/L (ref 3.5–5.5)
RBC UA: ABNORMAL /HPF (ref 0–2)
SODIUM BLD-SCNC: 131 MMOL/L (ref 132–146)
SPECIFIC GRAVITY UA: 1.02 (ref 1–1.03)
TOTAL PROTEIN: 7.2 G/DL (ref 6.4–8.3)
TROPONIN: <0.01 NG/ML (ref 0–0.03)
UROBILINOGEN, URINE: 2 E.U./DL
WBC # BLD: 23.2 E9/L (ref 4.5–11.5)
WBC UA: >20 /HPF (ref 0–5)

## 2020-08-24 PROCEDURE — 1123F ACP DISCUSS/DSCN MKR DOCD: CPT | Performed by: FAMILY MEDICINE

## 2020-08-24 PROCEDURE — 6360000002 HC RX W HCPCS: Performed by: STUDENT IN AN ORGANIZED HEALTH CARE EDUCATION/TRAINING PROGRAM

## 2020-08-24 PROCEDURE — 84484 ASSAY OF TROPONIN QUANT: CPT

## 2020-08-24 PROCEDURE — 2500000003 HC RX 250 WO HCPCS: Performed by: STUDENT IN AN ORGANIZED HEALTH CARE EDUCATION/TRAINING PROGRAM

## 2020-08-24 PROCEDURE — G8428 CUR MEDS NOT DOCUMENT: HCPCS | Performed by: FAMILY MEDICINE

## 2020-08-24 PROCEDURE — 99213 OFFICE O/P EST LOW 20 MIN: CPT | Performed by: FAMILY MEDICINE

## 2020-08-24 PROCEDURE — 85025 COMPLETE CBC W/AUTO DIFF WBC: CPT

## 2020-08-24 PROCEDURE — 93005 ELECTROCARDIOGRAM TRACING: CPT | Performed by: STUDENT IN AN ORGANIZED HEALTH CARE EDUCATION/TRAINING PROGRAM

## 2020-08-24 PROCEDURE — 71045 X-RAY EXAM CHEST 1 VIEW: CPT

## 2020-08-24 PROCEDURE — 87088 URINE BACTERIA CULTURE: CPT

## 2020-08-24 PROCEDURE — 1090F PRES/ABSN URINE INCON ASSESS: CPT | Performed by: FAMILY MEDICINE

## 2020-08-24 PROCEDURE — 99283 EMERGENCY DEPT VISIT LOW MDM: CPT

## 2020-08-24 PROCEDURE — 83605 ASSAY OF LACTIC ACID: CPT

## 2020-08-24 PROCEDURE — G8417 CALC BMI ABV UP PARAM F/U: HCPCS | Performed by: FAMILY MEDICINE

## 2020-08-24 PROCEDURE — 74176 CT ABD & PELVIS W/O CONTRAST: CPT

## 2020-08-24 PROCEDURE — 1036F TOBACCO NON-USER: CPT | Performed by: FAMILY MEDICINE

## 2020-08-24 PROCEDURE — 96374 THER/PROPH/DIAG INJ IV PUSH: CPT

## 2020-08-24 PROCEDURE — P9612 CATHETERIZE FOR URINE SPEC: HCPCS

## 2020-08-24 PROCEDURE — 4040F PNEUMOC VAC/ADMIN/RCVD: CPT | Performed by: FAMILY MEDICINE

## 2020-08-24 PROCEDURE — 83690 ASSAY OF LIPASE: CPT

## 2020-08-24 PROCEDURE — 2060000000 HC ICU INTERMEDIATE R&B

## 2020-08-24 PROCEDURE — 80053 COMPREHEN METABOLIC PANEL: CPT

## 2020-08-24 PROCEDURE — 81001 URINALYSIS AUTO W/SCOPE: CPT

## 2020-08-24 PROCEDURE — 36415 COLL VENOUS BLD VENIPUNCTURE: CPT

## 2020-08-24 PROCEDURE — 2580000003 HC RX 258: Performed by: STUDENT IN AN ORGANIZED HEALTH CARE EDUCATION/TRAINING PROGRAM

## 2020-08-24 RX ORDER — 0.9 % SODIUM CHLORIDE 0.9 %
500 INTRAVENOUS SOLUTION INTRAVENOUS ONCE
Status: COMPLETED | OUTPATIENT
Start: 2020-08-24 | End: 2020-08-25

## 2020-08-24 RX ORDER — 0.9 % SODIUM CHLORIDE 0.9 %
1000 INTRAVENOUS SOLUTION INTRAVENOUS ONCE
Status: COMPLETED | OUTPATIENT
Start: 2020-08-24 | End: 2020-08-24

## 2020-08-24 RX ADMIN — SODIUM CHLORIDE 500 ML: 9 INJECTION, SOLUTION INTRAVENOUS at 23:48

## 2020-08-24 RX ADMIN — SODIUM CHLORIDE 1000 ML: 9 INJECTION, SOLUTION INTRAVENOUS at 22:00

## 2020-08-24 RX ADMIN — METRONIDAZOLE 500 MG: 500 INJECTION, SOLUTION INTRAVENOUS at 23:51

## 2020-08-24 RX ADMIN — CEFTRIAXONE 2 G: 2 INJECTION, POWDER, FOR SOLUTION INTRAMUSCULAR; INTRAVENOUS at 23:46

## 2020-08-24 ASSESSMENT — ENCOUNTER SYMPTOMS
ALLERGIC/IMMUNOLOGIC NEGATIVE: 1
RESPIRATORY NEGATIVE: 1
NAUSEA: 1
EYES NEGATIVE: 1
SHORTNESS OF BREATH: 0
ABDOMINAL PAIN: 1
ABDOMINAL PAIN: 1

## 2020-08-24 NOTE — ED NOTES
FIRST PROVIDER CONTACT ASSESSMENT NOTE      Department of Emergency Medicine   ED  First Provider Note   8/24/20  4:43 PM EDT    Chief Complaint: Abdominal Pain (pt sent in by Dr Susy Tierney . pt is not eating or drinking well, concerned for VIV. pt has a bladder tumor. )      History of Present Illness:    Michael Doty is a 80 y.o. female who presents to the ED by private car for abdominal pain. Patient was seen by her PCP today and is not eating or drinking well. Patient does have a known bladder tumor and does see Dr. Meghna Espinal for urology. They are concerned for an acute kidney injury at this time. Patient is having pain in her right upper quadrant and does have a history of gallbladder stones as well. Focused Screening Exam:  Constitutional:  Alert, appears stated age and is in no distress.   Patient seems to be confused at this time      *ALLERGIES*     Enalapril maleate; Penicillins; and Sulfa antibiotics     ED Triage Vitals   BP Temp Temp src Pulse Resp SpO2 Height Weight   08/24/20 1642 08/24/20 1634 -- 08/24/20 1634 08/24/20 1642 08/24/20 1634 -- --   (!) 149/75 98.6 °F (37 °C)  75 20 97 %          Initial Plan of Care:  Initiate Treatment-Testing, Proceed toTreatment Area When Bed Available for ED Attending/MLP to Continue Care    -----------------END OF FIRST PROVIDER CONTACT ASSESSMENT NOTE--------------  Electronically signed by Amberly Rodriguez PA-C   DD: 8/24/20       Amberly Rodriguez PA-C  08/24/20 [address]

## 2020-08-24 NOTE — PROGRESS NOTES
20  Name: Vijay Justice    : 3/20/1928    Sex: female    Age: 80 y.o. Subjective:  Chief Complaint: Patient is here for nausea      Patient presents with her daughter. She had a procedure done few days ago they were unable to access the left ureter. She is discharged with a creatinine of 1.4. Also the last few days of increasing on nausea and abdominal pain. Her pain is in the left low back and right upper quadrant. She does have multiple gallstones. Ambulance was called last night but felt it was not hardly she did not take her to the hospital.  They did talk to Dr. Genny Liu last night and he felt he should go to the ER. She is not eating or drinking very well and afraid she is going with acute kidney failure. Her CAT scan does not show significant metastatic disease from my review      Review of Systems   Constitutional: Negative. HENT: Negative. Eyes: Negative. Respiratory: Negative. Cardiovascular: Negative. Gastrointestinal: Positive for abdominal pain and nausea. Endocrine: Negative. Genitourinary: Negative. Musculoskeletal: Negative. Skin: Negative. Allergic/Immunologic: Negative. Neurological: Negative. Hematological: Negative. Psychiatric/Behavioral: Negative. Current Outpatient Medications:     citalopram (CELEXA) 10 MG tablet, Take 1 tablet by mouth every morning, Disp: 90 tablet, Rfl: 3    Handicap Placard MISC, by Does not apply route Dx  Arthritis   5  yrs, Disp: 1 each, Rfl: 0    ammonium lactate (LAC-HYDRIN) 12 % lotion, Apply topically daily. , Disp: 1 Bottle, Rfl: 5    Multiple Vitamins-Minerals (ONCOVITE PO), Take by mouth daily, Disp: , Rfl:     Cholecalciferol (VITAMIN D3) 50 MCG (2000) CAPS, Take 1 capsule by mouth daily, Disp: 90 capsule, Rfl: 3    amLODIPine (NORVASC) 2.5 MG tablet, Take 1 tablet by mouth every morning, Disp: 90 tablet, Rfl: 12    calcium carbonate (TUMS) 500 MG chewable tablet, Take 1 tablet Not on file     Forced sexual activity: Not on file   Other Topics Concern    Not on file   Social History Narrative        Stress Test - 09 neg.  (Jaimes)    Duplex Carotid Ultasound - 09 no evidence of significant stenosis    HTN    LIPID    GLAUCOMA    C HYSTER    CVA--    OA L KNEE    DIET DM    SON SHAHZAD HINES AND WILL LIVE WITH HER    Pam Luis  1928 Page #2    LOW VIT B12 AND VIT D 4-10    DAUGHTER WORKS AT [employer]    GYN DR Frederick Pineda    US KIDNEY WITH RIGHT RENAL CYST AND SEVERAL RIGHT RENAL STONES    ADMIT WITH GI BLEED----AND EGD WITH ESOPHAGITIS AND POSS MASS BUT BX NEG---    2-15----DR HORN------RE DONE DR WALLER -15    ER WITH FALL WITH 20 % COMP FX L-1 AND CT ABD IWTH LEFT HYDRONEPHROSIS AND    URETER WITH NO STONE------GB STONE OR SLUDGE NOTED    CT  WITH DISTAL LEFT URETER MASS---DX WITH URETER CA---- LEFT----DR MAGGI FITZPATRICK-----STENT    SON DX WITH BLADDER CA -    Admit  10-19 with GB sx and new cary murmur   Seen by  Rosmery panchal and echo with mod AR      Past Medical History:   Diagnosis Date    Abdominal pain 2015    Acute cholecystitis 10/29/2019    Diet-controlled diabetes mellitus ([address] 2019    Gastritis 2015    Glaucoma     Hematemesis 2015    Hyperlipidemia     Hypertension      Family History   Problem Relation Age of Onset    Early Death Mother     Prostate Cancer Father     Kidney Cancer Sister     Cancer Brother     Alzheimer's Disease Brother     Cancer Sister       Past Surgical History:   Procedure Laterality Date    CYSTOSCOPY  2017    cysto retro left ureteroscopy, stent removal with Dr. Oletha Osler  2017    stent placement    CYSTOSCOPY  10/19/2017    Left stent placement    CYSTOSCOPY INSERTION / REMOVAL STENT / STONE Left 2020    CYSTOSCOPY, TRANSURETHRAL RESECTION BLADDER TUMOR performed by Raymond Johnson MD at [facility]

## 2020-08-25 ENCOUNTER — APPOINTMENT (OUTPATIENT)
Dept: MRI IMAGING | Age: 85
DRG: 982 | End: 2020-08-25
Payer: MEDICARE

## 2020-08-25 PROBLEM — N39.0 UTI (URINARY TRACT INFECTION): Status: ACTIVE | Noted: 2020-08-25

## 2020-08-25 PROBLEM — N13.30 HYDRONEPHROSIS: Status: ACTIVE | Noted: 2020-08-25

## 2020-08-25 PROBLEM — C66.2: Chronic | Status: ACTIVE | Noted: 2020-08-06

## 2020-08-25 PROBLEM — N13.4 HYDROURETER: Status: ACTIVE | Noted: 2020-08-25

## 2020-08-25 PROBLEM — M89.9 LESION OF LUMBAR SPINE: Status: ACTIVE | Noted: 2020-08-25

## 2020-08-25 PROBLEM — N18.30 KIDNEY DISEASE, CHRONIC, STAGE III (GFR 30-59 ML/MIN) (HCC): Chronic | Status: ACTIVE | Noted: 2020-08-17

## 2020-08-25 PROBLEM — C79.49 METASTASIS OF NEOPLASM TO SPINAL CANAL (HCC): Status: ACTIVE | Noted: 2020-08-25

## 2020-08-25 PROBLEM — D49.4 BLADDER TUMOR: Chronic | Status: ACTIVE | Noted: 2020-08-20

## 2020-08-25 LAB
EKG ATRIAL RATE: 71 BPM
EKG P AXIS: 30 DEGREES
EKG P-R INTERVAL: 132 MS
EKG Q-T INTERVAL: 410 MS
EKG QRS DURATION: 84 MS
EKG QTC CALCULATION (BAZETT): 445 MS
EKG R AXIS: -37 DEGREES
EKG T AXIS: 55 DEGREES
EKG VENTRICULAR RATE: 71 BPM

## 2020-08-25 PROCEDURE — 2060000000 HC ICU INTERMEDIATE R&B

## 2020-08-25 PROCEDURE — 6370000000 HC RX 637 (ALT 250 FOR IP): Performed by: STUDENT IN AN ORGANIZED HEALTH CARE EDUCATION/TRAINING PROGRAM

## 2020-08-25 PROCEDURE — 51798 US URINE CAPACITY MEASURE: CPT

## 2020-08-25 PROCEDURE — 93010 ELECTROCARDIOGRAM REPORT: CPT | Performed by: INTERNAL MEDICINE

## 2020-08-25 PROCEDURE — 6370000000 HC RX 637 (ALT 250 FOR IP): Performed by: INTERNAL MEDICINE

## 2020-08-25 PROCEDURE — 99222 1ST HOSP IP/OBS MODERATE 55: CPT | Performed by: NURSE PRACTITIONER

## 2020-08-25 PROCEDURE — 2500000003 HC RX 250 WO HCPCS: Performed by: INTERNAL MEDICINE

## 2020-08-25 PROCEDURE — 72148 MRI LUMBAR SPINE W/O DYE: CPT

## 2020-08-25 PROCEDURE — 2580000003 HC RX 258: Performed by: INTERNAL MEDICINE

## 2020-08-25 PROCEDURE — 99222 1ST HOSP IP/OBS MODERATE 55: CPT | Performed by: NEUROLOGICAL SURGERY

## 2020-08-25 PROCEDURE — 72146 MRI CHEST SPINE W/O DYE: CPT

## 2020-08-25 RX ORDER — SODIUM CHLORIDE 9 MG/ML
INJECTION, SOLUTION INTRAVENOUS CONTINUOUS
Status: DISCONTINUED | OUTPATIENT
Start: 2020-08-25 | End: 2020-08-28 | Stop reason: HOSPADM

## 2020-08-25 RX ORDER — DORZOLAMIDE HYDROCHLORIDE AND TIMOLOL MALEATE 20; 5 MG/ML; MG/ML
1 SOLUTION/ DROPS OPHTHALMIC 2 TIMES DAILY
Status: DISCONTINUED | OUTPATIENT
Start: 2020-08-25 | End: 2020-08-25 | Stop reason: CLARIF

## 2020-08-25 RX ORDER — DORZOLAMIDE HCL 20 MG/ML
1 SOLUTION/ DROPS OPHTHALMIC 2 TIMES DAILY
Status: DISCONTINUED | OUTPATIENT
Start: 2020-08-25 | End: 2020-08-28 | Stop reason: HOSPADM

## 2020-08-25 RX ORDER — CITALOPRAM 20 MG/1
10 TABLET ORAL EVERY MORNING
Status: DISCONTINUED | OUTPATIENT
Start: 2020-08-25 | End: 2020-08-28 | Stop reason: HOSPADM

## 2020-08-25 RX ORDER — POLYETHYLENE GLYCOL 3350 17 G/17G
17 POWDER, FOR SOLUTION ORAL DAILY PRN
Status: DISCONTINUED | OUTPATIENT
Start: 2020-08-25 | End: 2020-08-28 | Stop reason: HOSPADM

## 2020-08-25 RX ORDER — SODIUM CHLORIDE 0.9 % (FLUSH) 0.9 %
10 SYRINGE (ML) INJECTION PRN
Status: DISCONTINUED | OUTPATIENT
Start: 2020-08-25 | End: 2020-08-28 | Stop reason: HOSPADM

## 2020-08-25 RX ORDER — ACETAMINOPHEN 325 MG/1
650 TABLET ORAL EVERY 4 HOURS PRN
Status: DISCONTINUED | OUTPATIENT
Start: 2020-08-24 | End: 2020-08-25 | Stop reason: ALTCHOICE

## 2020-08-25 RX ORDER — SIMVASTATIN 10 MG
20 TABLET ORAL NIGHTLY
Status: DISCONTINUED | OUTPATIENT
Start: 2020-08-25 | End: 2020-08-28 | Stop reason: HOSPADM

## 2020-08-25 RX ORDER — LATANOPROST 50 UG/ML
1 SOLUTION/ DROPS OPHTHALMIC NIGHTLY
Status: DISCONTINUED | OUTPATIENT
Start: 2020-08-25 | End: 2020-08-28 | Stop reason: HOSPADM

## 2020-08-25 RX ORDER — SODIUM CHLORIDE 0.9 % (FLUSH) 0.9 %
10 SYRINGE (ML) INJECTION PRN
Status: DISCONTINUED | OUTPATIENT
Start: 2020-08-24 | End: 2020-08-25 | Stop reason: SDUPTHER

## 2020-08-25 RX ORDER — PROMETHAZINE HYDROCHLORIDE 25 MG/1
12.5 TABLET ORAL EVERY 6 HOURS PRN
Status: DISCONTINUED | OUTPATIENT
Start: 2020-08-25 | End: 2020-08-28 | Stop reason: HOSPADM

## 2020-08-25 RX ORDER — SODIUM CHLORIDE 0.9 % (FLUSH) 0.9 %
10 SYRINGE (ML) INJECTION EVERY 12 HOURS SCHEDULED
Status: DISCONTINUED | OUTPATIENT
Start: 2020-08-25 | End: 2020-08-25 | Stop reason: SDUPTHER

## 2020-08-25 RX ORDER — LOSARTAN POTASSIUM 50 MG/1
100 TABLET ORAL DAILY
Status: DISCONTINUED | OUTPATIENT
Start: 2020-08-25 | End: 2020-08-28 | Stop reason: HOSPADM

## 2020-08-25 RX ORDER — TIMOLOL MALEATE 5 MG/ML
1 SOLUTION/ DROPS OPHTHALMIC 2 TIMES DAILY
Status: DISCONTINUED | OUTPATIENT
Start: 2020-08-25 | End: 2020-08-28 | Stop reason: HOSPADM

## 2020-08-25 RX ORDER — ACETAMINOPHEN 650 MG/1
650 SUPPOSITORY RECTAL EVERY 6 HOURS PRN
Status: DISCONTINUED | OUTPATIENT
Start: 2020-08-25 | End: 2020-08-28 | Stop reason: HOSPADM

## 2020-08-25 RX ORDER — SODIUM CHLORIDE 0.9 % (FLUSH) 0.9 %
10 SYRINGE (ML) INJECTION EVERY 12 HOURS SCHEDULED
Status: DISCONTINUED | OUTPATIENT
Start: 2020-08-25 | End: 2020-08-28 | Stop reason: HOSPADM

## 2020-08-25 RX ORDER — ACETAMINOPHEN 325 MG/1
650 TABLET ORAL EVERY 6 HOURS PRN
Status: DISCONTINUED | OUTPATIENT
Start: 2020-08-25 | End: 2020-08-28 | Stop reason: HOSPADM

## 2020-08-25 RX ORDER — AMLODIPINE BESYLATE 2.5 MG/1
2.5 TABLET ORAL EVERY MORNING
Status: DISCONTINUED | OUTPATIENT
Start: 2020-08-25 | End: 2020-08-28 | Stop reason: HOSPADM

## 2020-08-25 RX ORDER — ONDANSETRON 2 MG/ML
4 INJECTION INTRAMUSCULAR; INTRAVENOUS EVERY 6 HOURS PRN
Status: DISCONTINUED | OUTPATIENT
Start: 2020-08-25 | End: 2020-08-28 | Stop reason: HOSPADM

## 2020-08-25 RX ADMIN — ACETAMINOPHEN 650 MG: 325 TABLET ORAL at 01:07

## 2020-08-25 RX ADMIN — ACETAMINOPHEN 650 MG: 325 TABLET, FILM COATED ORAL at 09:27

## 2020-08-25 RX ADMIN — METRONIDAZOLE 500 MG: 500 INJECTION, SOLUTION INTRAVENOUS at 16:54

## 2020-08-25 RX ADMIN — Medication 10 ML: at 21:15

## 2020-08-25 RX ADMIN — DORZOLAMIDE HYDROCHLORIDE 1 DROP: 20 SOLUTION/ DROPS OPHTHALMIC at 09:21

## 2020-08-25 RX ADMIN — CITALOPRAM HYDROBROMIDE 10 MG: 20 TABLET ORAL at 09:21

## 2020-08-25 RX ADMIN — SIMVASTATIN 20 MG: 10 TABLET, FILM COATED ORAL at 21:15

## 2020-08-25 RX ADMIN — AMLODIPINE BESYLATE 2.5 MG: 2.5 TABLET ORAL at 09:21

## 2020-08-25 RX ADMIN — SODIUM CHLORIDE: 9 INJECTION, SOLUTION INTRAVENOUS at 09:22

## 2020-08-25 RX ADMIN — Medication 10 ML: at 09:21

## 2020-08-25 RX ADMIN — LOSARTAN POTASSIUM 100 MG: 50 TABLET, FILM COATED ORAL at 09:21

## 2020-08-25 RX ADMIN — ACETAMINOPHEN 650 MG: 325 TABLET, FILM COATED ORAL at 21:15

## 2020-08-25 RX ADMIN — TIMOLOL MALEATE 1 DROP: 5 SOLUTION OPHTHALMIC at 21:15

## 2020-08-25 RX ADMIN — METRONIDAZOLE 500 MG: 500 INJECTION, SOLUTION INTRAVENOUS at 09:22

## 2020-08-25 RX ADMIN — TIMOLOL MALEATE 1 DROP: 5 SOLUTION OPHTHALMIC at 09:21

## 2020-08-25 RX ADMIN — DORZOLAMIDE HYDROCHLORIDE 1 DROP: 20 SOLUTION/ DROPS OPHTHALMIC at 21:15

## 2020-08-25 ASSESSMENT — ENCOUNTER SYMPTOMS
DIARRHEA: 0
NAUSEA: 1
VOMITING: 0
PHOTOPHOBIA: 0
CHEST TIGHTNESS: 0

## 2020-08-25 ASSESSMENT — PAIN DESCRIPTION - LOCATION
LOCATION: ABDOMEN
LOCATION: BACK
LOCATION: ABDOMEN

## 2020-08-25 ASSESSMENT — PAIN DESCRIPTION - PROGRESSION
CLINICAL_PROGRESSION: NOT CHANGED
CLINICAL_PROGRESSION: NOT CHANGED

## 2020-08-25 ASSESSMENT — PAIN SCALES - GENERAL
PAINLEVEL_OUTOF10: 10
PAINLEVEL_OUTOF10: 2
PAINLEVEL_OUTOF10: 4
PAINLEVEL_OUTOF10: 0
PAINLEVEL_OUTOF10: 0
PAINLEVEL_OUTOF10: 9

## 2020-08-25 ASSESSMENT — PAIN - FUNCTIONAL ASSESSMENT: PAIN_FUNCTIONAL_ASSESSMENT: ACTIVITIES ARE NOT PREVENTED

## 2020-08-25 ASSESSMENT — PAIN DESCRIPTION - ONSET: ONSET: GRADUAL

## 2020-08-25 ASSESSMENT — PAIN DESCRIPTION - ORIENTATION
ORIENTATION: RIGHT;LOWER
ORIENTATION: RIGHT;LOWER

## 2020-08-25 ASSESSMENT — PAIN DESCRIPTION - FREQUENCY: FREQUENCY: INTERMITTENT

## 2020-08-25 ASSESSMENT — PAIN DESCRIPTION - PAIN TYPE
TYPE: ACUTE PAIN
TYPE: CHRONIC PAIN
TYPE: ACUTE PAIN

## 2020-08-25 ASSESSMENT — PAIN DESCRIPTION - DESCRIPTORS
DESCRIPTORS: ACHING
DESCRIPTORS: ACHING;SORE

## 2020-08-25 NOTE — PROGRESS NOTES
Koko Goldstein was ordered RHOPRESSA 0.02 % SOLN which is a nonformulary medication. The patient will need to have their home supply of this medication brought in to the hospital for inpatient use. If the medication has not been administered by 1400 on the following day from the time the order was placed, a pharmacist will follow-up with the nurse of the patient to assess the capability of the patient to bring in the medication. If it is determined that the patient cannot supply the medication and it is not available to be dispensed from the pharmacy, a call will be placed to the ordering provider to discuss alternative options. Salud Carranza.  Virginie Faye, MikeD 8/25/2020 8:00 AM

## 2020-08-25 NOTE — CONSULTS
Palliative Care Department  Palliative Care Initial Consult  Provider: Toni PICKERING, RONALDCN-BC    Hospital Day: 2    Referring Provider:  Jennifer Dahl MD     Reason for Consult:  []  Code status Discussion  [x]  Assist with goals of care  []  Psychosocial support  []  Symptom Management  []  Advanced Care Planning    Chief Complaint: Zeynep Valdivia is a 80 y.o. female with chief complaint of nausea and abdominal pain      Active Hospital Problems    Diagnosis Date Noted    UTI (urinary tract infection) [N39.0] 08/25/2020    Hydronephrosis [N13.30] 08/25/2020    Hydroureter [N13.4] 08/25/2020    Lesion of lumbar spine [M89.9] 08/25/2020    Abdominal pain [R10.9] 08/24/2020    Bladder tumor [D49.4] 08/20/2020    Gross hematuria [R31.0] 08/20/2020    Kidney disease, chronic, stage III (GFR 30-59 ml/min) ([facility] [N18.3] 08/17/2020    Ureteral carcinoma, left ([facility] [C66.2] 08/06/2020    Cholelithiasis [K80.20] 02/18/2020    Acute cholecystitis [K81.0] 10/29/2019    Anxiety [F41.9] 08/20/2019    Essential hypertension [I10] 08/20/2019    Gastroesophageal reflux disease without esophagitis [K21.9] 08/20/2019    Hyperlipidemia [E78.5] 08/20/2019    Glaucoma [H40.9]            Palliative Care Encounter/Recommendations:      - Goals of care: to be determined- after HIDA scan and MRI of the spine are completed        - Code Status: full code     - Symptom Management: PER IM       Subjective:     HPI:  Zeynep Valdivia is a 80 y.o. female with significant past medical history of gastritis, glaucoma, hematemesis, hyperlipidemia, hypertension, diet-controlled diabetes mellitus, acute cholecystitis, abdominal pain who presented with nausea and abdominal pain L low back, R upper quadrant. Noted to have multiple gallstones. Subjective/Events/Discussions:  Patient had procedure done a few days ago, but was unable to have her left ureter accessed at the time.   Discharged with creatinine of 1.4.  Now she has increased nausea and abdominal pain. Palliative care has been consulted for goals of care. Surgery team is waiting to do HIDA scan until goals of care have been clarified. Met with patient at the bedside along with daughter Marybeth Wesley. During our conversation, surgery came in/Dr. Geovanny De La Torre. Surgery is going to order the HIDA scan. Patient now going to be n.p.o. for the test.  Patient will determine what she would like to do along with her family after results of the HIDA scan. Also during our conversation, Dr. Delvis Sharp came in to see the patient. Farzaneh Cespedes is going to order an MRI of the patient's spine. Upon completion of both of these tests, patient and family will know how they would like to proceed with care. Explained palliative cares purpose and pamphlet given. Explained CODE STATUS levels in great detail and pamphlet given. Antonella stated that she likes the DNR CCA, and she will speak to the rest of the family. Patient is to stay a full CODE STATUS at this time.       - Family Meeting:   Participants:HCPOA and patient   Family meeting was held to discuss:Code status, goals of care, prior expressed wishes and discharge plan    -Surrogate/Legal NOK: Child and Extended Family:    Contacts:    [address] [place] [address] sister Mary Jimenez [phone]  [address] [place] [address], [address] [place] [address] alternate   Past Medical History:   Diagnosis Date    Abdominal pain 1/29/2015    Acute cholecystitis 10/29/2019    Diet-controlled diabetes mellitus ([address] 8/20/2019    Gastritis 1/30/2015    Glaucoma     Hematemesis 1/29/2015    Hyperlipidemia     Hypertension        Past Surgical History:   Procedure Laterality Date    CYSTOSCOPY  04/04/2017    cysto retro left ureteroscopy, stent removal with Dr. Luisa Don  07/18/2017    stent placement    CYSTOSCOPY  10/19/2017    Left stent placement    CYSTOSCOPY INSERTION / REMOVAL STENT / STONE Left 8/20/2020    CYSTOSCOPY, TRANSURETHRAL RESECTION BLADDER TUMOR performed by Chaitanya Wu MD at [facility] Left 4/17/2018    CYSTOSCOPY RETROGRADE PYELOGRAM BLADDER AND LEFT URETERAL WASHINGS INSERTION LEFT URETERAL STENT LEFT URETEROSCOPY performed by Chaitanya Wu MD at [place] [address]  1/29/15       Family History   Problem Relation Age of Onset    Early Death Mother     Prostate Cancer Father     Kidney Cancer Sister     Cancer Brother     Alzheimer's Disease Brother     Cancer Sister        Social history:  Robinson status: no  Marital status:   Living status: with family:  daughter   Work history: unknown     Allergies   Allergen Reactions    Enalapril Maleate Other (See Comments)     Other reaction(s): cough/rash    Penicillins     Sulfa Antibiotics        ROS: UNLESS STATED PATIENT DENIES:  CONSTITUTIONAL:  fever, chill, rigors, nausea, vomiting, fatigue. HEENT: blurry vision, double vision, hearing problem, tinnitus, hoarseness, dysphagia, odynophagia  RESPIRATORY: cough, shortness of breath, sputum expectoration. CARDIOVASCULAR:  Chest pain/pressure, palpitation, syncope, irregular beats  GASTROINTESTINAL:  abdominal or rectal pain, diarrhea, constipation, . GENITOURINARY:  Burning, frequency, urgency, incontinence, discharge  INTEGUMENTARY: rash, wound, pruritis  HEMATOLOGIC/LYMPHATIC:  Swelling, sores, gum bleeding, easy bruising, pica.   MUSCULOSKELETAL:  pain, edema, joint swelling or redness  NEUROLOGICAL:  light headed, dizziness, loss of consciousness, weakness, change in memory, seizures, tremors        Objective:     Physical Exam  BP (!) 152/71   Pulse 86   Temp 99.2 °F (37.3 °C) (Temporal)   Resp 16   Ht 5' (1.524 m)   Wt 142 lb 7 oz (64.6 kg)   SpO2 93%   BMI 27.82 kg/m²     Gen:  Alert, appears stated age, well nourished, in no acute distress  HEENT:  Normocephalic, conjunctiva pink, no drainage, mucosa moist  Neck:  Supple  Lungs: Diminished bilaterally, no audible rhonchi or wheezes noted  Heart[de-identified]  RRR, + murmur, no rub, or gallop noted during exam  Abd:  Soft,  Tender- RLQ, mildly distended, BS+  :  Voids  Ext:  Moving all extremities,1+ edema, pulses present  Skin:  Warm and dry  Neuro:  PERRL, Alert, oriented x 3; following commands        Current Medications:  Inpatient medications reviewed: yes  Home Medications reviewed: yes    Results/Verification of Data Review  Objective data reviewed: labs, images, records, medication use, vitals and chart      - Advanced Directives: Living Will, HC-POA and Documents available in the [employer]   -  - Spiritual assessment: No spiritual distress identified     - Bereavement and grief: to be determined    - Discharge planning: to be determined    - Prognosis: unknown and per input of consults     - Referrals to: none today    Time/Communication  Greater than 51% of time spent, total 50 minutes in counseling and coordination of care at the bedside regarding goals of care. Assessment/Plan   1. Continue current treatment   2. Urology- left ureteral tumor in 2016, do not believe the left kidney is causing any of her acute symptoms  3. General surgery- HIDA scan, patient to get prior to make any decisions on gallbladder intervention  4. Neurosurgery-MRI of the spine  5. Palliative care- goals-contingent upon results of HIDA scan and MRI of the spine, CODE STATUS- full code, patient's daughter to talk to family about CODE STATUS, pamphlet given      Kevyn NOLAN-CNP, AGACNP-BC  Palliative Medicine    Discussed patient and the plan of care with the other IDT members of Palliative Care, and with consultants, Primary Attending, patient, family and floor nurse. Thank you for allowing Palliative Medicine to participate in the care of [address] [place] [address]. Note: This report was completed using computerHereOrThere voiced recognition software.   Every effort has been made to ensure accuracy; however, inadvertent computerized transcription errors may be present.

## 2020-08-25 NOTE — CONSULTS
[address]                  [address] [place],  [facility]                                  CONSULTATION    PATIENT NAME: Zaida Isaac              :        1928  MED REC NO:   [id number]                            ROOM:       [id number]  ACCOUNT NO:   [de-identified]                           ADMIT DATE: 2020  PROVIDER:     Megan Jasmine MD    CONSULT DATE:  2020    REASON FOR CONSULT:  T11 and L2 lytic lesions, suspicious for spinal  metastasis. HISTORY OF PRESENT ILLNESS:  The patient is a 70-year-old lady who  presented to the hospital yesterday with abdominal pain and nausea. Part of her workup included a CT scan of her abdomen that showed T11 and  L1 osteolytic lesions and T12 compression fracture. She does complain  of some intermittent back pain and she rates that about 3 to 4/10, it  does not radiate to her legs, does radiate across into her abdomen. She  denies any new numbness, tingling, weakness or loss of control of bowel  or bladder function. PAST MEDICAL HISTORY:  Positive for hypertension, hyperlipidemia,  hematemesis, glaucoma, gastritis, diabetes, acute cholecystitis and  abdominal pain. PAST SURGICAL HISTORY:  Positive for upper GI endoscopy, hysterectomy,  cystoscopy. FAMILY HISTORY:  Positive for prostate cancer in her father, kidney  cancer in her sister. SOCIAL HISTORY:  Negative for tobacco or alcohol use. HOME MEDICATIONS:  Include Celexa and Norvasc. REVIEW OF SYSTEMS:  HEENT:  Negative for headache, double vision, blurry  vision. CARDIOVASCULAR:  Negative for chest pain, arrhythmia,  palpitations. RESPIRATORY:  Negative for shortness of breath, asthma,  bronchitis or pneumonia. GASTROINTESTINAL:  Positive for nausea. GENITOURINARY:  Negative for hematuria or dysuria. HEMATOLOGIC:   Positive for easy bruising. INFECTIOUS:  Negative for any recent  infection.   MUSCULOSKELETAL: Positive for joint stiffness and swelling. PSYCHIATRIC:  Negative for anxiety or depression. NEUROLOGIC:  Negative  for seizure or stroke. ENDOCRINE:  Negative for thyroid disorder, but  positive for diabetes. PHYSICAL EXAMINATION:  VITAL SIGNS:  She is currently afebrile with a T-current of 37.3 degrees  Celsius, pulse 86, blood pressure is 152/71, respiratory rate is 16. GENERAL:  She is resting in bed. Does not appear to be in any acute  distress, appears her stated age. HEENT:  Head is normocephalic and atraumatic. Pupils are 3 to 2 mm and  reactive. She has no drainage out of her eyes, ears, nose or throat. SKIN:  Skin is warm and dry. MUSCULOSKELETAL:  She has got good range of motion in her bilateral  upper and lower extremities. ABDOMEN:  Soft, nontender, nondistended. RESPIRATORY:  She is not using any accessory muscles of respiration. NEUROLOGIC:  On the rest of her neurologic exam, she is awake, alert,  oriented to person and hospital.  Cranial nerves II through XII are  intact bilaterally. Motor exam reveals 4+/5 strength in her bilateral  upper and lower extremities. Sensation is grossly intact to light  touch. Reflexes are 1+ and symmetric. Toes are going down. REVIEW OF IMAGING:  She has a CT scan of her abdomen and pelvis that  shows T11 and L1 pathologic lytic lesions with T12 compression fracture. ASSESSMENT AND PLAN:  The patient is a 80-year-old lady with history of  cancer and lytic lesions at T12 and L1. She is neurologically stable. The plan is to obtain an MRI of her thoracic and lumbar spine.   She does  have a GFR of 19, as a result of that we will go ahead and order the MRI  without contrast.        Flaca Flores MD    D: 08/25/2020 11:17:56       T: 08/25/2020 11:42:16     [email or web address]  Job#: [id number]     Doc#: [id number]    CC:

## 2020-08-25 NOTE — CONSULTS
[address]                  [address] [facility],  [facility]                                  CONSULTATION    PATIENT NAME: Rickie Clarke              :        1928  MED REC NO:   [id number]                            ROOM:       [id number]  ACCOUNT NO:   [de-identified]                           ADMIT DATE: 2020  PROVIDER:     Megha Rincon MD    CONSULT DATE:  2020    LOCATION:  She is in . CHIEF COMPLAINT:  Abdominal pain. PRESENT ILLNESS:  This 80-year-old female who is well known to Dr. 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 She has been treated by local removal on 2020. She was brought to  the operating room, was found to have a very extensive superficial tumor  involving most of the left side of her bladder. She had resection  carried out. She is known to have a hydronephrotic left kidney which  has been blocked for several years and has been asymptomatic in the  past.  The patient was at home, began complaining of abdominal pain. She was having some minor hematuria and her abdominal pain appeared to  be predominantly in her upper quadrants particularly on the right. A  CAT scan of the abdomen was performed. She was found to have a  hydronephrosis of the left kidney which was basically stable. The right  kidney has a cyst involved with the lower pole; however, there is no  evidence of obstruction. The patient had has also experienced some  nausea and vomiting. She was admitted to the hospital on 2020. PAST MEDICAL HISTORY:  Significant for diet-controlled diabetes  mellitus, gastritis, glaucoma, hyperlipidemia, hypertension and the  above-mentioned left ureteral tumor. PAST SURGICAL HISTORY:  Includes hysterectomy, upper gastrointestinal  endoscopy, multiple cystoscopies. SOCIAL HISTORY:  She has never been a cigarette smoker.     FAMILY HISTORY:  Includes Alzheimer's disease in her brother, cancer in  her brothers and sisters, kidney cancer in one sister and prostate  cancer in her father. ALLERGIES:  To ENALAPRIL MALEATE, PENICILLINS, and SULFA. PHYSICAL EXAMINATION:  GENERAL:  The patient is elderly, but alert, appears oriented. She does  not appear to be in acute distress. ABDOMEN:  Shows some tenderness, but no evidence of severe obstruction. IMPRESSION:  I do not believe her left kidney is causing any of her  acute symptoms. We will check her urine. It had been planned that the  patient was to have a second look cystoscopy in several weeks, this will  be done pending the results of her current admission.         Breezy Nair MD    D: 08/25/2020 10:44:41       T: 08/25/2020 10:50:43     [email or web address]  Job#: [id number]     Doc#: [id number]    CC:

## 2020-08-25 NOTE — CONSULTS
GENERAL SURGERY  CONSULT NOTE  8/25/2020    Physician Consulted: Dr. Lazarus Kempf  Reason for Consult: cholecystitis  Referring Physician: Dr. Josesito Redman    [facility]  Lucita Echols is a 80 y.o. female with history of bladder tumor, previous cholecystitis who presents for evaluation of cholecystitis. Patient states she has been having lower abdominal pain for the past 3-4 days that radiates to her RUQ and to her back. She has had associated nausea and vomiting/dry heaves. She does not think the pain is associated with eating. States the pain comes and goes. She states she has had similar pain before. She was seen in 10/2019 with acute cholecystitis and family was reluctant to proceed with cholecystectomy secondary to patient's age and comorbidities. When followed up outpatient does not appear she was still symptomatic and therefore did not have her gallbladder removed. Patient states her pain has been improving with IV abx and medications in the ED. She was found to have a UTI, she was to possibly get stented by urology for hydronephrosis from the bladder tumor. LFTs WNL, WBC 23.2. CT abd/pelv shows bladder tumor, lytic lesions to back, fat stranding around gallbladder, similar to previous episode.        Past Medical History:   Diagnosis Date    Abdominal pain 1/29/2015    Acute cholecystitis 10/29/2019    Diet-controlled diabetes mellitus ([address] 8/20/2019    Gastritis 1/30/2015    Glaucoma     Hematemesis 1/29/2015    Hyperlipidemia     Hypertension        Past Surgical History:   Procedure Laterality Date    CYSTOSCOPY  04/04/2017    cysto retro left ureteroscopy, stent removal with Dr. Sarahi Nobles  07/18/2017    stent placement    CYSTOSCOPY  10/19/2017    Left stent placement    CYSTOSCOPY INSERTION / REMOVAL STENT / STONE Left 8/20/2020    CYSTOSCOPY, TRANSURETHRAL RESECTION BLADDER TUMOR performed by Sung Garcia MD at [address] CATHETER Left 4/17/2018    CYSTOSCOPY RETROGRADE PYELOGRAM BLADDER AND LEFT URETERAL WASHINGS INSERTION LEFT URETERAL STENT LEFT URETEROSCOPY performed by Stefanie Severance, MD at [address]  1/29/15       Medications Prior to Admission:    Prior to Admission medications    Medication Sig Start Date End Date Taking? Authorizing Provider   citalopram (CELEXA) 10 MG tablet Take 1 tablet by mouth every morning 8/19/20   Fidelina Lujan, DO   Handicap Placard MISC by Does not apply route Dx  Arthritis   5  yrs 8/6/20   Keila Lujan, DO   ammonium lactate (LAC-HYDRIN) 12 % lotion Apply topically daily.  4/3/20   Ganga Lujan DO   Multiple Vitamins-Minerals (ONCOVITE PO) Take by mouth daily    Historical Provider, MD   Cholecalciferol (VITAMIN D3) 50 MCG (2000 UT) CAPS Take 1 capsule by mouth daily 11/23/19   Ganga Lujan, DO   amLODIPine (NORVASC) 2.5 MG tablet Take 1 tablet by mouth every morning 11/11/19   Ganga Lujan, DO   calcium carbonate (TUMS) 500 MG chewable tablet Take 1 tablet by mouth daily    Historical Provider, MD   bimatoprost (LUMIGAN) 0.01 % SOLN ophthalmic drops Place 1 drop into both eyes nightly    Historical Provider, MD   dorzolamide-timolol (COSOPT) 22.3-6.8 MG/ML ophthalmic solution Place 1 drop into both eyes 2 times daily    Historical Provider, MD   RHOPRESSA 0.02 % SOLN Apply 1 drop to eye daily Indications: administer one drop in both eyes every morning 10/30/19   Historical Provider, MD   simvastatin (ZOCOR) 20 MG tablet Take 1 tablet by mouth nightly 8/20/19   Ganga Lujan DO   losartan (COZAAR) 100 MG tablet Take 1 tablet by mouth daily 8/20/19   Ganga Lujan, DO       Allergies   Allergen Reactions    Enalapril Maleate Other (See Comments)     Other reaction(s): cough/rash    Penicillins     Sulfa Antibiotics        Family History   Problem Relation Age of Onset    Early Death Mother     Prostate Cancer Father     Kidney Cancer Sister     Cancer Brother     Alzheimer's Disease Brother     Cancer Sister        Social History     Tobacco Use    Smoking status: Never Smoker    Smokeless tobacco: Never Used   Substance Use Topics    Alcohol use: Never     Frequency: Never    Drug use: Never         Review of Systems   General ROS: negative  Hematological and Lymphatic ROS: negative  Respiratory ROS: negative  Cardiovascular ROS: negative  Gastrointestinal ROS: see HPI  Genito-Urinary ROS: see HPI  Musculoskeletal ROS: negative      PHYSICAL EXAM:    Vitals:    20 0245   BP: (!) 144/74   Pulse: 72   Resp: 18   Temp: 98.6 °F (37 °C)   SpO2: 95%       General Appearance:  awake, alert, oriented, in no acute distress  Lungs:  No increased work of breathing  Heart:  Heart regular rate and rhythm  Abdomen:  Soft, very tender lower abdomen with voluntary guarding, tender RUQ less so, ND. Extremities: pulses present in all extremities      LABS:    CBC  Recent Labs     20  1657   WBC 23.2*   HGB 12.8   HCT 39.0        BMP  Recent Labs     20  1657   *   K 4.5   CL 96*   CO2 20*   BUN 24*   CREATININE 1.6*   CALCIUM 9.4     Liver Function  Recent Labs     20  1657   LIPASE 20   BILITOT 0.7   AST 21   ALT 13   ALKPHOS 91   PROT 7.2   LABALBU 3.9     No results for input(s): LACTATE in the last 72 hours. No results for input(s): INR, PTT in the last 72 hours. Invalid input(s): PT    RADIOLOGY    Ct Abdomen Pelvis Wo Contrast    Result Date: 2020  Patient MRN:  [id number] : 3/20/1928 Age: 80 years Gender: Female Order Date:  2020 10:25 PM EXAM: CT ABDOMEN PELVIS WO CONTRAST COMPARISON: 2020 INDICATION:  pain pain TECHNIQUE:  Low-dose CT acquisition technique included one of the following options; 1. Automated exposure control, 2. Adjustment of mA and/or kV according to the patient's size or 3. Use of iterative reconstruction.  FINDINGS: There is redemonstration of small free fluid located within the pelvis which is similar in amount compared to the previous examination. There is hyperattenuating nodular mass associated with the anterior aspect of the urinary bladder measuring approximately 4.5 x 2 cm in axial and AP dimension. Hazy inflammatory changes surrounding the urinary bladder. There is redemonstration of nodular thickening involving the posterior inferior aspect of urinary bladder at insertion of left ureter. There is redemonstration of left hydroureter. Hyperdense material layers within the dilated distal left ureter versus mass. There is stable moderate left hydronephrosis. No hydronephrosis on the right. Redemonstration of the exophytic cyst associated with the right kidney which measures up to 4.3 cm and is stable compared to prior. There is thickened wall of the gallbladder with surrounding areas of irregular attenuation suggestive of inflammation. Numerous tiny gallstones layer within the gallbladder. No intrahepatic or extrahepatic bile duct dilatation. No evidence of acute pancreatitis. Spleen is nonenlarged. There is a moderate size hiatal hernia. No bowel obstruction or free air. Numerous diverticula are associated with the sigmoid colon. No evidence of acute diverticulitis. The appendix is visualized and appears unremarkable in the right lower quadrant. There is subsegmental atelectasis are present within the lung bases which have increased compared to prior. No obvious airspace opacity or pleural effusion. There is redemonstration of stable compression fracture involving L1 vertebral body. Lytic lesion is associated with L2 vertebral body and T11 vertebral body appearing similar compared to the previous examinations. 1. Redemonstration of mass associated with anterior aspect of the urinary bladder as well as nodular thickening involving posterior inferior wall of urinary bladder at origin of left ureter.  These findings are likely related to known urinary bladder neoplasm. 2. Stable left hydroureter and left hydronephrosis. 3. New gallbladder wall thickening with surrounding inflammatory changes and multiple calcified gallstones. Findings could suggest acute cholecystitis. 4. Stable small free fluid is located within the pelvis. 5. Inflammatory changes surrounding the urinary bladder suggestive of cystitis. 6. Diverticulosis without evidence of acute diverticulitis. 7. Redemonstration of lytic lesions involving L2 and T11 vertebral bodies. 8. Stable compression fracture involving L1. Xr Chest Portable    Result Date: 2020  Patient MRN: [id number] : 3/20/1928 Age:  80 years Gender: Female Order Date: 2020 9:09 PM Exam: XR CHEST PORTABLE Number of Images: 1 view Indication:   pna? pna? Comparison: 2015 Findings: The heart is enlarged. The lung fields demonstrate evidence for airspace disease. The aorta is tortuous and ectatic. Tortuous ectatic aorta Cardiomegaly Airspace disease compatible with atelectasis or pneumonia, mild, at both lung bases The chest appears to be worse in the interval         ASSESSMENT:  80 y.o. female with UTI, cholecystitis. PLAN:  - Patient and family want to avoid surgery if possible, expressed this multiple times during exam  - Large bladder tumor, UTI found. This seems to be the main source of pain however is having RUQ pain and she does have some pericholecystic fat stranding and edema  - Rocephin, flagyl  - HIDA  - If HIDA is + will discuss possible percutaneous cholecystostomy tube.      Electronically signed by Liane Hassan MD on 20 at 3:35 AM EDT

## 2020-08-25 NOTE — H&P
[address]                  [address] [facility],  [facility]                              HISTORY AND PHYSICAL    PATIENT NAME: Becka Sheppard              :        1928  MED REC NO:   [id number]                            ROOM:       [id number]  ACCOUNT NO:   [de-identified]                           ADMIT DATE: 2020  PROVIDER:     Constance Herrmann DO    CHIEF COMPLAINT:  Abdominal pain. HISTORY OF PRESENT ILLNESS:  The patient is a 80-year-old   female presented to the emergency room complaining of abdominal pain. She had recent urologic procedure by Urology for bladder tumor last  week. She is seen in the emergency room. Diagnostic evaluation  revealed white count 23.2. CT abdomen revealed mass in bladder, left  hydroureter, left hydronephrosis, gallbladder wall thickening,  gallstones, lytic lesions of the spine. She was admitted for further  evaluation and treatment. PAST MEDICAL HISTORY:  Bladder CA, glaucoma, hyperlipidemia,  hypertension. PAST SURGICAL HISTORY:  Bilateral eye cataract surgery, multiple  urologic procedures with previous ureteral stent placement,  hysterectomy. SOCIAL HISTORY:  No tobacco, no alcohol. REVIEW OF SYSTEMS:  Remarkable for above-stated chief complaint. ALLERGIES:  SULFA, PENICILLIN, and ENALAPRIL. PRIMARY CARE PHYSICIAN:  Ganga Lujan DO    MEDICATIONS PRIOR TO ADMISSION:  Celexa, Lac-Hydrin topical,  multivitamin, vitamin D, Norvasc, Tums, Lumigan eyedrops, Cosopt  eyedrops, Rhopressa eyedrops, Zocor, Cozaar. PHYSICAL EXAMINATION:  GENERAL APPEARANCE:  Reveals a 80-year-old  female who is  alert, cooperative, and a fair historian. Much of history obtained from  the patient's daughter who is at the bedside. VITAL SIGNS:  On admission, temperature 98.6, pulse 75, respirations 20,  blood pressure 149/75. HEENT:  Head:  Normocephalic, atraumatic.   Eyes:  Pupils equal and  reactive to light. Extraocular muscles intact. Fundi not well  visualized. Nose:  No obstruction, polyp or discharge noted. Mouth  mucosa without lesion. Pharynx noninjected without exudate. NECK:  Supple. No JVD. No thyromegaly. No carotid bruits. HEART:  Regular rate and rhythm with grade 2/6 systolic murmur. LUNGS:  Clear to auscultation bilaterally. ABDOMEN:  Positive bowel sounds. Minimally distended. There is minimal  diffuse tenderness to palpation. No rebound. No guarding. No  hepatosplenomegaly. No masses. BACK:  With increased thoracic kyphosis. EXTREMITIES:  With minimal nonpitting edema in the lower extremities. LYMPH NODES:  No adenopathy noted. SKIN:  Without rash or lesion. IMPRESSION:  Abdominal pain, UTI, cholecystitis, cholelithiasis, bladder  CA, left hydroureter, left hydronephrosis, glaucoma, hyperlipidemia,  hypertension, gross hematuria, lytic lesions of the spine on CT of the  abdomen. PLAN:  Admit. Urology and Surgery to see. IV fluids, empiric  antibiotics. DISCHARGE PLAN:  Home when stable.         Laverne Hardy DO    D: 08/25/2020 7:53:32       T: 08/25/2020 7:58:40     [email or web address]  Job#: [id number]     Doc#: [id number]    CC:

## 2020-08-25 NOTE — ED PROVIDER NOTES
Pradip Croona is a 80year old female with PMH of cancer, GERD, HLD, HTN, who presented to emergency department with abdominal pain and nausea that has been present for 1 day and is worsening. Patient went to her doctor today Dr. Ang Rutledge and was sent to ED for evaluation. Patient has a recent cystoscopy for bladder cancer. Patient was going to have a stent placed on the left side but they could not stent her ureter due to the mass obstruction. Patient then began to have nausea and abdominal pain today with right-sided abdominal pain. Patient does have a history of a gallbladder pathology. Patient has not been able to eat for 1 day. Patient denies any fevers, chills. Patient is having nausea without vomiting. Patient has not tried anything for symptoms nothing makes symptoms better or worse. The history is provided by the patient, a relative and medical records. Abdominal Pain   Pain location:  RLQ  Pain quality: sharp, shooting and stabbing    Pain radiates to:  Does not radiate  Pain severity:  Moderate  Onset quality:  Gradual  Duration:  1 day  Timing:  Constant  Progression:  Worsening  Chronicity:  Recurrent  Context: not sick contacts and not trauma    Relieved by:  Nothing  Worsened by:  Nothing  Ineffective treatments:  None tried  Associated symptoms: fatigue and nausea    Associated symptoms: no chest pain, no chills, no diarrhea, no dysuria, no fever, no shortness of breath and no vomiting    Risk factors: being elderly and recent hospitalization         Review of Systems   Constitutional: Positive for fatigue. Negative for chills, diaphoresis and fever. Eyes: Negative for photophobia and visual disturbance. Respiratory: Negative for chest tightness and shortness of breath. Cardiovascular: Negative for chest pain and palpitations. Gastrointestinal: Positive for abdominal pain and nausea. Negative for diarrhea and vomiting. Genitourinary: Negative for dysuria. Musculoskeletal: Negative for neck pain and neck stiffness. Skin: Negative for pallor and rash. Allergic/Immunologic: Negative for immunocompromised state. Neurological: Negative for dizziness and headaches. Psychiatric/Behavioral: Negative for confusion. Physical Exam  Vitals signs and nursing note reviewed. Constitutional:       General: She is not in acute distress. Appearance: She is well-developed. She is not ill-appearing. HENT:      Head: Normocephalic and atraumatic. Mouth/Throat:      Pharynx: Oropharynx is clear. Eyes:      Conjunctiva/sclera: Conjunctivae normal.      Pupils: Pupils are equal, round, and reactive to light. Neck:      Musculoskeletal: Normal range of motion and neck supple. No neck rigidity or muscular tenderness. Cardiovascular:      Rate and Rhythm: Normal rate and regular rhythm. Pulmonary:      Effort: Pulmonary effort is normal.      Breath sounds: Normal breath sounds. Abdominal:      General: Bowel sounds are normal.      Tenderness: There is abdominal tenderness in the right lower quadrant. Musculoskeletal:      Right lower leg: No edema. Left lower leg: No edema. Skin:     General: Skin is warm and dry. Capillary Refill: Capillary refill takes less than 2 seconds. Coloration: Skin is not pale. Findings: No erythema or rash. Neurological:      Mental Status: She is alert and oriented to person, place, and time. Psychiatric:         Mood and Affect: Mood normal.          Procedures     MDM  Number of Diagnoses or Management Options  Abdominal pain, unspecified abdominal location:   Acute cystitis with hematuria:   Cholecystitis:   Diagnosis management comments: Saad Celeste is a 80-year-old female presented to emergency department with pain and nausea patient was sent by PCP for admission. On evaluation found to have glucose of 23. Was tachycardic at arrival to ED and improved with 1 L IV fluid.   Was never hypotensive in the department. Patient  was found to have cholecystitis changes on CT scan-new for patient likely cause of patient's nausea with abdominal pain. Patient was started on Rocephin and Flagyl in the emergency department. Patient also found to have a secondary source of infection in her urine. Patient does have a history of left ureter mass on the opposite side of patient's pain. With hydronephrosis. Urology was also consulted as a possible secondary source of infection. Neurology will see patient and follow. Surgery was consulted for acute cholecystitis And will follow patient for further evaluation management. Patient is well appearing at time of admission not in any distress discussed results and plan with patient and daughter they are agreeable to plan. Amount and/or Complexity of Data Reviewed  Decide to obtain previous medical records or to obtain history from someone other than the patient: yes         ED Course as of Aug 25 0949   Mon Aug 24, 2020   2336 NPO after midnight per urology    [SS]   [phone] Surgery will follow patient and resident to evaluate patient in ED per Dr. Gisela Langford    [SS]   Tue Aug 25, 2020   0699 EKG: This EKG is signed and interpreted by me. Rate: 71  Rhythm: Sinus  Interpretation: left axis deviation with PVC and PACs  Comparison: stable as compared to patient's most recent EKG      [SS]      ED Course User Index  [SS] Davina Mortimer, MD            --------------------------------------------- PAST HISTORY ---------------------------------------------  Past Medical History:  has a past medical history of Abdominal pain, Acute cholecystitis, Diet-controlled diabetes mellitus ([address] Gastritis, Glaucoma, Hematemesis, Hyperlipidemia, and Hypertension. Past Surgical History:  has a past surgical history that includes Hysterectomy; Upper gastrointestinal endoscopy (1/29/15); Cystocopy (04/04/2017); Cystoscopy (07/18/2017);  Cystocopy (10/19/2017); pr cystourethroscopy,ureter catheter (Left, 4/17/2018); and CYSTOSCOPY INSERTION / REMOVAL STENT / STONE (Left, 8/20/2020). Social History:  reports that she has never smoked. She has never used smokeless tobacco. She reports that she does not drink alcohol or use drugs. Family History: family history includes Alzheimer's Disease in her brother; Cancer in her brother and sister; Early Death in her mother; Kidney Cancer in her sister; Prostate Cancer in her father. The patients home medications have been reviewed.     Allergies: Enalapril maleate; Penicillins; and Sulfa antibiotics    -------------------------------------------------- RESULTS -------------------------------------------------    LABS:  Results for orders placed or performed during the hospital encounter of 08/24/20   Urinalysis with Microscopic   Result Value Ref Range    Color, UA RED (A) Straw/Yellow    Clarity, UA TURBID (A) Clear    Glucose, Ur Negative Negative mg/dL    Bilirubin Urine Negative Negative    Ketones, Urine TRACE (A) Negative mg/dL    Specific Gravity, UA 1.020 1.005 - 1.030    Blood, Urine LARGE (A) Negative    pH, UA 6.5 5.0 - 9.0    Protein, UA >=300 (A) Negative mg/dL    Urobilinogen, Urine 2.0 (A) <2.0 E.U./dL    Nitrite, Urine POSITIVE (A) Negative    Leukocyte Esterase, Urine MODERATE (A) Negative    WBC, UA >20 (A) 0 - 5 /HPF    RBC, UA PACKED 0 - 2 /HPF    Bacteria, UA MANY (A) None Seen /HPF   CBC auto differential   Result Value Ref Range    WBC 23.2 (H) 4.5 - 11.5 E9/L    RBC 4.25 3.50 - 5.50 E12/L    Hemoglobin 12.8 11.5 - 15.5 g/dL    Hematocrit 39.0 34.0 - 48.0 %    MCV 91.8 80.0 - 99.9 fL    MCH 30.1 26.0 - 35.0 pg    MCHC 32.8 32.0 - 34.5 %    RDW 13.3 11.5 - 15.0 fL    Platelets 896 622 - 670 E9/L    MPV 10.3 7.0 - 12.0 fL    Neutrophils % 87.2 (H) 43.0 - 80.0 %    Immature Granulocytes % 0.6 0.0 - 5.0 %    Lymphocytes % 5.8 (L) 20.0 - 42.0 %    Monocytes % 6.3 2.0 - 12.0 %    Eosinophils % 0.0 0.0 - 6.0 % Basophils % 0.1 0.0 - 2.0 %    Neutrophils Absolute 20.18 (H) 1.80 - 7.30 E9/L    Immature Granulocytes # 0.15 E9/L    Lymphocytes Absolute 1.34 (L) 1.50 - 4.00 E9/L    Monocytes Absolute 1.45 (H) 0.10 - 0.95 E9/L    Eosinophils Absolute 0.01 (L) 0.05 - 0.50 E9/L    Basophils Absolute 0.03 0.00 - 0.20 E9/L   Comprehensive Metabolic Panel   Result Value Ref Range    Sodium 131 (L) 132 - 146 mmol/L    Potassium 4.5 3.5 - 5.0 mmol/L    Chloride 96 (L) 98 - 107 mmol/L    CO2 20 (L) 22 - 29 mmol/L    Anion Gap 15 7 - 16 mmol/L    Glucose 147 (H) 74 - 99 mg/dL    BUN 24 (H) 8 - 23 mg/dL    CREATININE 1.6 (H) 0.5 - 1.0 mg/dL    GFR Non-African American 30 >=60 mL/min/1.73    GFR African American 36     Calcium 9.4 8.6 - 10.2 mg/dL    Total Protein 7.2 6.4 - 8.3 g/dL    Alb 3.9 3.5 - 5.2 g/dL    Total Bilirubin 0.7 0.0 - 1.2 mg/dL    Alkaline Phosphatase 91 35 - 104 U/L    ALT 13 0 - 32 U/L    AST 21 0 - 31 U/L   Lipase   Result Value Ref Range    Lipase 20 13 - 60 U/L   Lactic Acid, Plasma   Result Value Ref Range    Lactic Acid 1.7 0.5 - 2.2 mmol/L   Troponin   Result Value Ref Range    Troponin <0.01 0.00 - 0.03 ng/mL   EKG 12 Lead   Result Value Ref Range    Ventricular Rate 71 BPM    Atrial Rate 71 BPM    P-R Interval 132 ms    QRS Duration 84 ms    Q-T Interval 410 ms    QTc Calculation (Bazett) 445 ms    P Axis 30 degrees    R Axis -37 degrees    T Axis 55 degrees       RADIOLOGY:  CT ABDOMEN PELVIS WO CONTRAST   Final Result      1. Redemonstration of mass associated with anterior aspect of the   urinary bladder as well as nodular thickening involving posterior   inferior wall of urinary bladder at origin of left ureter. These   findings are likely related to known urinary bladder neoplasm. 2. Stable left hydroureter and left hydronephrosis. 3. New gallbladder wall thickening with surrounding inflammatory   changes and multiple calcified gallstones. Findings could suggest   acute cholecystitis.       4. are answered at this time and they are agreeable with the plan of admission.    --------------------------------- ADDITIONAL PROVIDER NOTES ---------------------------------  Consultations:   Spoke with Dr. Ed Garcia. Discussed case. They will admit the patient. This patient's ED course included: a personal history and physicial examination, re-evaluation prior to disposition, multiple bedside re-evaluations, IV medications and cardiac monitoring    This patient has remained hemodynamically stable during their ED course. Diagnosis:  1. Abdominal pain, unspecified abdominal location    2. Cholecystitis    3. Acute cystitis with hematuria    4. Abdominal mass, left lower quadrant    5. Other hydronephrosis        Disposition:  Patient's disposition: Admit to telemetry  Patient's condition is stable.             Tony Heredia MD  Resident  08/25/20 0559

## 2020-08-25 NOTE — PROGRESS NOTES
Patient urinated without difficulty however bladder scanner not available from central supply and not able to be found at this time. Will attempt at a later time.

## 2020-08-25 NOTE — CARE COORDINATION
SW spoke with Pt and Dtr about Transition Plan of Care. Pt lives with her son with no steps to enter the home. Pt uses no DME. PCP: Dr. Isaías Higgins. Pharmacy: [facility]. Family will transport at home at discharge. Declined [facility]. Discharge plan is to return home with Son , no needs. [facility] to follow for discharge needs.    Merlin Sly, L.S.W.  [phone]

## 2020-08-26 ENCOUNTER — APPOINTMENT (OUTPATIENT)
Dept: NUCLEAR MEDICINE | Age: 85
DRG: 982 | End: 2020-08-26
Payer: MEDICARE

## 2020-08-26 LAB
ANION GAP SERPL CALCULATED.3IONS-SCNC: 16 MMOL/L (ref 7–16)
BUN BLDV-MCNC: 26 MG/DL (ref 8–23)
CALCIUM SERPL-MCNC: 8.9 MG/DL (ref 8.6–10.2)
CHLORIDE BLD-SCNC: 100 MMOL/L (ref 98–107)
CO2: 19 MMOL/L (ref 22–29)
CREAT SERPL-MCNC: 1.6 MG/DL (ref 0.5–1)
GFR AFRICAN AMERICAN: 36
GFR NON-AFRICAN AMERICAN: 30 ML/MIN/1.73
GLUCOSE BLD-MCNC: 112 MG/DL (ref 74–99)
HCT VFR BLD CALC: 33.9 % (ref 34–48)
HEMOGLOBIN: 11.2 G/DL (ref 11.5–15.5)
MCH RBC QN AUTO: 30 PG (ref 26–35)
MCHC RBC AUTO-ENTMCNC: 33 % (ref 32–34.5)
MCV RBC AUTO: 90.9 FL (ref 80–99.9)
PDW BLD-RTO: 13.6 FL (ref 11.5–15)
PLATELET # BLD: 182 E9/L (ref 130–450)
PMV BLD AUTO: 10.7 FL (ref 7–12)
POTASSIUM SERPL-SCNC: 3.9 MMOL/L (ref 3.5–5)
RBC # BLD: 3.73 E12/L (ref 3.5–5.5)
SODIUM BLD-SCNC: 135 MMOL/L (ref 132–146)
URINE CULTURE, ROUTINE: NORMAL
WBC # BLD: 18.9 E9/L (ref 4.5–11.5)

## 2020-08-26 PROCEDURE — 99232 SBSQ HOSP IP/OBS MODERATE 35: CPT | Performed by: FAMILY MEDICINE

## 2020-08-26 PROCEDURE — 6360000002 HC RX W HCPCS: Performed by: INTERNAL MEDICINE

## 2020-08-26 PROCEDURE — 2500000003 HC RX 250 WO HCPCS: Performed by: INTERNAL MEDICINE

## 2020-08-26 PROCEDURE — 36415 COLL VENOUS BLD VENIPUNCTURE: CPT

## 2020-08-26 PROCEDURE — 78226 HEPATOBILIARY SYSTEM IMAGING: CPT

## 2020-08-26 PROCEDURE — 2580000003 HC RX 258: Performed by: INTERNAL MEDICINE

## 2020-08-26 PROCEDURE — A9537 TC99M MEBROFENIN: HCPCS | Performed by: RADIOLOGY

## 2020-08-26 PROCEDURE — 6370000000 HC RX 637 (ALT 250 FOR IP): Performed by: INTERNAL MEDICINE

## 2020-08-26 PROCEDURE — 3430000000 HC RX DIAGNOSTIC RADIOPHARMACEUTICAL: Performed by: RADIOLOGY

## 2020-08-26 PROCEDURE — 80048 BASIC METABOLIC PNL TOTAL CA: CPT

## 2020-08-26 PROCEDURE — 2060000000 HC ICU INTERMEDIATE R&B

## 2020-08-26 PROCEDURE — 85027 COMPLETE CBC AUTOMATED: CPT

## 2020-08-26 RX ADMIN — TIMOLOL MALEATE 1 DROP: 5 SOLUTION OPHTHALMIC at 08:49

## 2020-08-26 RX ADMIN — METRONIDAZOLE 500 MG: 500 INJECTION, SOLUTION INTRAVENOUS at 08:48

## 2020-08-26 RX ADMIN — Medication 10 ML: at 21:11

## 2020-08-26 RX ADMIN — TIMOLOL MALEATE 1 DROP: 5 SOLUTION OPHTHALMIC at 21:12

## 2020-08-26 RX ADMIN — DORZOLAMIDE HYDROCHLORIDE 1 DROP: 20 SOLUTION/ DROPS OPHTHALMIC at 21:12

## 2020-08-26 RX ADMIN — AMLODIPINE BESYLATE 2.5 MG: 2.5 TABLET ORAL at 08:50

## 2020-08-26 RX ADMIN — CEFTRIAXONE SODIUM 1 G: 1 INJECTION, POWDER, FOR SOLUTION INTRAMUSCULAR; INTRAVENOUS at 00:38

## 2020-08-26 RX ADMIN — ACETAMINOPHEN 650 MG: 325 TABLET, FILM COATED ORAL at 08:50

## 2020-08-26 RX ADMIN — LATANOPROST 1 DROP: 50 SOLUTION OPHTHALMIC at 21:12

## 2020-08-26 RX ADMIN — LOSARTAN POTASSIUM 100 MG: 50 TABLET, FILM COATED ORAL at 08:49

## 2020-08-26 RX ADMIN — Medication 8.8 MILLICURIE: at 09:14

## 2020-08-26 RX ADMIN — SIMVASTATIN 20 MG: 10 TABLET, FILM COATED ORAL at 21:11

## 2020-08-26 RX ADMIN — METRONIDAZOLE 500 MG: 500 INJECTION, SOLUTION INTRAVENOUS at 00:38

## 2020-08-26 RX ADMIN — ACETAMINOPHEN 650 MG: 325 TABLET, FILM COATED ORAL at 17:35

## 2020-08-26 RX ADMIN — DORZOLAMIDE HYDROCHLORIDE 1 DROP: 20 SOLUTION/ DROPS OPHTHALMIC at 08:49

## 2020-08-26 RX ADMIN — CITALOPRAM HYDROBROMIDE 10 MG: 20 TABLET ORAL at 08:49

## 2020-08-26 RX ADMIN — LATANOPROST 1 DROP: 50 SOLUTION OPHTHALMIC at 00:34

## 2020-08-26 RX ADMIN — METRONIDAZOLE 500 MG: 500 INJECTION, SOLUTION INTRAVENOUS at 17:36

## 2020-08-26 RX ADMIN — CEFTRIAXONE SODIUM 1 G: 1 INJECTION, POWDER, FOR SOLUTION INTRAMUSCULAR; INTRAVENOUS at 22:44

## 2020-08-26 ASSESSMENT — PAIN DESCRIPTION - PAIN TYPE
TYPE: ACUTE PAIN

## 2020-08-26 ASSESSMENT — PAIN SCALES - GENERAL
PAINLEVEL_OUTOF10: 3
PAINLEVEL_OUTOF10: 3
PAINLEVEL_OUTOF10: 0
PAINLEVEL_OUTOF10: 3
PAINLEVEL_OUTOF10: 3

## 2020-08-26 ASSESSMENT — PAIN DESCRIPTION - LOCATION
LOCATION: ABDOMEN

## 2020-08-26 ASSESSMENT — PAIN DESCRIPTION - ONSET: ONSET: ON-GOING

## 2020-08-26 ASSESSMENT — PAIN DESCRIPTION - PROGRESSION
CLINICAL_PROGRESSION: NOT CHANGED
CLINICAL_PROGRESSION: NOT CHANGED

## 2020-08-26 ASSESSMENT — PAIN DESCRIPTION - DESCRIPTORS: DESCRIPTORS: ACHING;SORE;DISCOMFORT

## 2020-08-26 ASSESSMENT — PAIN DESCRIPTION - FREQUENCY: FREQUENCY: CONTINUOUS

## 2020-08-26 ASSESSMENT — PAIN - FUNCTIONAL ASSESSMENT: PAIN_FUNCTIONAL_ASSESSMENT: ACTIVITIES ARE NOT PREVENTED

## 2020-08-26 ASSESSMENT — PAIN DESCRIPTION - ORIENTATION
ORIENTATION: RIGHT
ORIENTATION: RIGHT;LOWER

## 2020-08-26 NOTE — PROGRESS NOTES
GENERAL SURGERY  DAILY PROGRESS NOTE    Date:2020       VBEQ:[id number]  Patient Teri Paredes     YOB: 1928     Age:89 y.o. Chief Complaint:  Chief Complaint   Patient presents with    Abdominal Pain     pt sent in by Dr Kendrick Lawrence . pt is not eating or drinking well, concerned for VIV. pt has a bladder tumor. Subjective:  No nausea/vomiting/pain. Objective:  BP (!) 104/55   Pulse 78   Temp 99.6 °F (37.6 °C) (Temporal)   Resp 20   Ht 5' (1.524 m)   Wt 142 lb 7 oz (64.6 kg)   SpO2 94%   BMI 27.82 kg/m²   Temp (24hrs), Av.1 °F (37.3 °C), Min:97.5 °F (36.4 °C), Max:99.9 °F (37.7 °C)      I/O (24Hr): Intake/Output Summary (Last 24 hours) at 2020 06  Last data filed at 2020 0622  Gross per 24 hour   Intake 848.09 ml   Output 1100 ml   Net -251.91 ml       GENERAL:  No acute distress. Alert and interactive. LUNGS:  No cough. Nonlabored breathing on room air. CARDIOVASC:  Normal rate, no cyanosis. ABDOMEN:  Soft, non-distended, subjectively mildly tender RUQ. No guarding / rigidity / rebound. EXTREMITIES:  No edema, no deformities. Assessment:  80 y.o. female with possible cholecystitis    Plan:  - NPO for HIDA today  - rocephin/flagyl  - family to continue discussing goals of care, with the aid of HIDA result    Electronically signed by Ernestina Guerrero MD on 2020 at 6:28 AM     Attending Note:     Patient seen/examined 2020. Agree with above assessment and plan. HIDA shows cystic duct obstruction. Discussed findings with patient and 2 daughters, all agree to proceed with perc cholecystomy drain insertion.

## 2020-08-26 NOTE — PROGRESS NOTES
Discussed positive HIDA with patient and family at bedside - recommended percutaneous cholecystostomy given she already failed antibiotic-only treatment in the past. Family is still unsure and would like more time to decide, as well as discuss with urology (I did explain that the two procedures shouldn't interfere with each other and can be done in any order). Recommend perc cristopher if patient/family amenable - continue antibiotics for now. Surgery team will follow peripherally.     Electronically signed by Deepa Bhatia MD on 8/26/2020 at 1:41 PM

## 2020-08-26 NOTE — PROGRESS NOTES
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, left hydronephrosis      Hospital course to date:  Initial evaluations have demonstrated lytic lesions in the lumbar spine and lower thoracic spine as well as a compression fracture of T12  Patient was evaluated by general surgery and is having a HIDA scan for possible cholecystitis because of tenderness in the right upper quadrant as well  Her vitals are stable  She did have a white cell count of 23.2 which is dropped to 18.9  And she has CKD with a BUN of 26 and a creatinine of 1.6      SUBJECTIVE:  New symptoms or concerns: Her daughter is the power of   The patient states that she currently feels okay  She states she has abdominal discomfort if pressed  Otherwise stable    Patient is interactive and capable of expressing symptoms and needs    ROS:  Negative to a 10 system review except that mentioned in the HPI. Pertinent items are noted in HPI.          DIET:Diet NPO, After Midnight    Allergies   Allergen Reactions    Enalapril Maleate Other (See Comments)     Other reaction(s): cough/rash    Penicillins     Sulfa Antibiotics        MEDICATION SIDE EFFECTS:none    SCHEDULED MEDS:   Current Facility-Administered Medications   Medication Dose Route Frequency Provider Last Rate Last Dose    ondansetron (ZOFRAN) injection 4 mg  4 mg Intravenous Q6H PRN Matthew Blank MD        amLODIPine (NORVASC) tablet 2.5 mg  2.5 mg Oral JESSY Dunbar DO 2.5 mg at 08/26/20 0850    citalopram (CELEXA) tablet 10 mg  10 mg Oral QAM Diego Garcia Malmer, DO   10 mg at 08/26/20 7700    losartan (COZAAR) tablet 100 mg  100 mg Oral Daily Diego Jose Malmer, DO   100 mg at 08/26/20 0849    Netarsudil Dimesylate 0.02 % SOLN 1 drop  1 drop Ophthalmic Daily Diego Jose Malmer, DO        simvastatin (ZOCOR) tablet 20 mg  20 mg Oral Nightly Diego Jose Malmer, DO   20 mg at 08/25/20 2115    sodium chloride flush 0.9 % injection 10 mL  10 mL Intravenous 2 times per day Diego Jose Malmer, DO   10 mL at 08/25/20 2115    sodium chloride flush 0.9 % injection 10 mL  10 mL Intravenous PRN Diego Mangle Malmer, DO        acetaminophen (TYLENOL) tablet 650 mg  650 mg Oral Q6H PRN Diego Jose Malmer, DO   650 mg at 08/26/20 7374    Or    acetaminophen (TYLENOL) suppository 650 mg  650 mg Rectal Q6H PRN Diego Mangle Malmer, DO        polyethylene glycol (GLYCOLAX) packet 17 g  17 g Oral Daily PRN Diego Jose Malmer, DO        promethazine (PHENERGAN) tablet 12.5 mg  12.5 mg Oral Q6H PRN Diego Able Malmer, DO        0.9 % sodium chloride infusion   Intravenous Continuous Diego Jose Greenmer, DO 75 mL/hr at 08/25/20 1135      cefTRIAXone (ROCEPHIN) 1 g in sterile water 10 mL IV syringe  1 g Intravenous Q24H Diego Jose Malmer, DO   1 g at 08/26/20 0038    metronidazole (FLAGYL) 500 mg in NaCl 100 mL IVPB premix  500 mg Intravenous Ludwin Pouch Malmer,  mL/hr at 08/26/20 0848 500 mg at 08/26/20 0848    latanoprost (XALATAN) 0.005 % ophthalmic solution 1 drop  1 drop Both Eyes Nightly Diego Jose Malmer, DO   1 drop at 08/26/20 0034    dorzolamide (TRUSOPT) 2 % ophthalmic solution 1 drop  1 drop Both Eyes BID Diego Mangle Malmer, DO   1 drop at 08/26/20 8475    And    timolol (TIMOPTIC) 0.5 % ophthalmic solution 1 drop  1 drop Both Eyes BID Diego Jose Malmer, DO   1 drop at 08/26/20 6903       I have reviewed the patient's medications; see Medication Reconciliation.     Current  Infusions   sodium chloride 75 mL/hr at 08/25/20 0922       Prn Medsondansetron, sodium chloride flush, acetaminophen **OR** acetaminophen, polyethylene glycol, promethazine     OBJECTIVE:  Wt Readings from Last 3 Encounters:   08/25/20 142 lb 7 oz (64.6 kg)   08/24/20 142 lb (64.4 kg)   08/20/20 144 lb (65.3 kg)     Temp Readings from Last 3 Encounters:   08/26/20 98.4 °F (36.9 °C) (Temporal)   08/24/20 98.6 °F (37 °C)   08/21/20 98 °F (36.7 °C) (Oral)     BP Readings from Last 3 Encounters:   08/26/20 (!) 144/72   08/24/20 128/82   08/21/20 119/62     Pulse Readings from Last 3 Encounters:   08/26/20 94   08/24/20 72   08/21/20 72     @LASTSAO2(3)@      Intake/Output Summary (Last 24 hours) at 8/26/2020 1033  Last data filed at 8/26/2020 0700  Gross per 24 hour   Intake 1690.29 ml   Output 1100 ml   Net 590.29 ml       Weight:   Wt Readings from Last 3 Encounters:   08/25/20 142 lb 7 oz (64.6 kg)   08/24/20 142 lb (64.4 kg)   08/20/20 144 lb (65.3 kg)       Current Inpatient Medications:   amLODIPine  2.5 mg Oral QAM    citalopram  10 mg Oral QAM    losartan  100 mg Oral Daily    Netarsudil Dimesylate  1 drop Ophthalmic Daily    simvastatin  20 mg Oral Nightly    sodium chloride flush  10 mL Intravenous 2 times per day    cefTRIAXone (ROCEPHIN) IV  1 g Intravenous Q24H    metroNIDAZOLE  500 mg Intravenous Q8H    latanoprost  1 drop Both Eyes Nightly    dorzolamide  1 drop Both Eyes BID    And    timolol  1 drop Both Eyes BID       IV Infusions (if any):   sodium chloride 75 mL/hr at 08/25/20 5351         General appearance:   Skin: Skin color, texture, turgor normal. No rashes or lesions  HEENT: Head: Normocephalic, no lesions, without obvious abnormality. Eyes: Normal external eye, conjunctiva, lids cornea, CLARE.   Ears: Externally normal, hearing assessed  Mouth: symmetric, tongue appears normal, palate moves symmetrically  Neck: no adenopathy and thyroid not enlarged, symmetric, no tenderness/mass/nodules  Lungs: clear to auscultation, excursion is adequate, no abnormal sounds  Heart: S1S2 audible, sound normal. No rub or gallop. Abdomen: soft mildly-tender, no palpable organomegaly or masses, contours are normal  Extremities: Perfusion is adequate there is no edema  Neurologic: There is no focal neurologic abnormality  Cognition: She appears to be somewhat confused with regard to time otherwise she is interactive but slow. She is pleasant and cooperative    CBC:   Lab Results   Component Value Date    WBC 18.9 08/26/2020    RBC 3.73 08/26/2020    HGB 11.2 08/26/2020    HCT 33.9 08/26/2020    MCV 90.9 08/26/2020    MCH 30.0 08/26/2020    MCHC 33.0 08/26/2020    RDW 13.6 08/26/2020     08/26/2020    MPV 10.7 08/26/2020     CMP:    Lab Results   Component Value Date     08/26/2020    K 3.9 08/26/2020    K 3.9 10/31/2019     08/26/2020    CO2 19 08/26/2020    BUN 26 08/26/2020    CREATININE 1.6 08/26/2020    GFRAA 36 08/26/2020    LABGLOM 30 08/26/2020    GLUCOSE 112 08/26/2020    PROT 7.2 08/24/2020    LABALBU 3.9 08/24/2020    CALCIUM 8.9 08/26/2020    BILITOT 0.7 08/24/2020    ALKPHOS 91 08/24/2020    AST 21 08/24/2020    ALT 13 08/24/2020       DIAGNOSTIC/ LABORATORY DATA:  Labs:   CBC:   Recent Labs     08/24/20  1657 08/26/20  0602   WBC 23.2* 18.9*   HGB 12.8 11.2*   HCT 39.0 33.9*    182     BMP:   Recent Labs     08/24/20  1657 08/26/20  0602   * 135   K 4.5 3.9   CO2 20* 19*   BUN 24* 26*   CREATININE 1.6* 1.6*   LABGLOM 30 30   CALCIUM 9.4 8.9     Mag: No results for input(s): MG in the last 72 hours. Phos: No results for input(s): PHOS in the last 72 hours. TSH: No results for input(s): TSH in the last 72 hours. HgA1c:   Lab Results   Component Value Date    LABA1C 6.1 (H) 08/04/2020     No results found for: EAG    BNP: No results for input(s): BNP in the last 72 hours. PT/INR: No results for input(s): PROTIME, INR in the last 72 hours. APTT:No results for input(s):  APTT in the last 72 hours. CARDIAC ENZYMES:  Recent Labs     08/24/20  2154   TROPONINI <0.01     FASTING LIPID PANEL:  Lab Results   Component Value Date    CHOL 197 08/04/2020    HDL 50 08/04/2020    LDLCALC 109 08/04/2020    TRIG 191 08/04/2020     LIVER PROFILE:  Recent Labs     08/24/20  1657   AST 21   ALT 13   LABALBU 3.9       TELEMETRY: REVIEWED--Telemetry: Sinus    RADIOLOGY: REVIEWED AVAILABLE REPORT  MRI LUMBAR SPINE WO CONTRAST   Final Result      1. No evidence of metastatic disease. 2. The rounded lucent lesion seen in the T11 and L2 vertebral bodies   correspond to fat signal and may represent hemangioma or focal fatty   deposition. 3. Degenerative changes. No significant central canal stenosis. 4. Multilevel neural foraminal stenoses, worst (moderate) at bilateral   L2-3, L3-4 and L5-S1 levels. 5. Chronic anterior wedge compression deformity of the L1 vertebral   body with approximately 65% loss of height. MRI THORACIC SPINE WO CONTRAST   Final Result      1. No fracture or metastatic disease. 2. Degenerative changes, as described. No significant central canal or   neural foraminal stenosis. CT ABDOMEN PELVIS WO CONTRAST   Final Result      1. Redemonstration of mass associated with anterior aspect of the   urinary bladder as well as nodular thickening involving posterior   inferior wall of urinary bladder at origin of left ureter. These   findings are likely related to known urinary bladder neoplasm. 2. Stable left hydroureter and left hydronephrosis. 3. New gallbladder wall thickening with surrounding inflammatory   changes and multiple calcified gallstones. Findings could suggest   acute cholecystitis. 4. Stable small free fluid is located within the pelvis. 5. Inflammatory changes surrounding the urinary bladder suggestive of   cystitis. 6. Diverticulosis without evidence of acute diverticulitis.       7. Redemonstration of lytic lesions involving L2 and T11 vertebral   bodies. 8. Stable compression fracture involving L1.       XR CHEST PORTABLE   Final Result   Tortuous ectatic aorta   Cardiomegaly    Airspace disease compatible with atelectasis or pneumonia, mild, at   both lung bases   The chest appears to be worse in the interval                  NM HEPATOBILIARY    (Results Pending)       CONSULTANT NOTES AND RECOMMENDATIONS REVIEWED:    CURRENT ASSESSMENT:        Problem List:  Patient Active Problem List   Diagnosis    Glaucoma    Gastric mass    Localized osteoarthritis of right knee    Essential hypertension    Hyperlipidemia    Gastroesophageal reflux disease without esophagitis    Anxiety    Acute cholecystitis    Nonrheumatic aortic valve stenosis    Nonrheumatic aortic valve insufficiency    Cholelithiasis    Ureteral carcinoma, left (HCC)    Kidney disease, chronic, stage III (GFR 30-59 ml/min) (HCC)    Gross hematuria    Bladder tumor    Ureteral tumor    Abdominal pain    UTI (urinary tract infection)    Hydronephrosis    Hydroureter    Lesion of lumbar spine    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

## 2020-08-26 NOTE — PLAN OF CARE
Problem: Skin Integrity:  Goal: Will show no infection signs and symptoms  Description: Will show no infection signs and symptoms  Outcome: Met This Shift  Goal: Absence of new skin breakdown  Description: Absence of new skin breakdown  Outcome: Met This Shift

## 2020-08-26 NOTE — PROGRESS NOTES
Palliative Care Department  Palliative Care Progress Note  Provider: [address] [place] [address] Day: 3    Referring Provider:  Machelle Rutherford MD     Reason for Consult:  [x]  Assist with goals of care    Chief Complaint: Nishant Dye is a 80 y.o. 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 ([facility] [C79.49] 08/25/2020    Abdominal pain [R10.9] 08/24/2020    Bladder tumor [D49.4] 08/20/2020    Gross hematuria [R31.0] 08/20/2020    Kidney disease, chronic, stage III (GFR 30-59 ml/min) ([facility] [N18.3] 08/17/2020    Ureteral carcinoma, left ([facility] [C66.2] 08/06/2020    Cholelithiasis [K80.20] 02/18/2020    Acute cholecystitis [K81.0] 10/29/2019    Anxiety [F41.9] 08/20/2019    Essential hypertension [I10] 08/20/2019    Gastroesophageal reflux disease without esophagitis [K21.9] 08/20/2019    Hyperlipidemia [E78.5] 08/20/2019    Glaucoma [H40.9]            Palliative Care Encounter/Recommendations:      - Goals of care: to be determined- after HIDA scan and MRI of the spine are completed        - Code Status: full code     - Symptom Management: PER IM       Subjective:     HPI:  Nishant Dye is a 80 y.o. female with significant past medical history of gastritis, glaucoma, hematemesis, hyperlipidemia, hypertension, diet-controlled diabetes mellitus, acute cholecystitis, abdominal pain who presented with nausea and abdominal pain L low back, R upper quadrant. Noted to have multiple gallstones. Subjective/Events/Discussions:  Patient had procedure done a few days ago, but was unable to have her left ureter accessed at the time. Discharged with creatinine of 1.4. Now she has increased nausea and abdominal pain. Palliative care has been consulted for goals of care. Surgery team is waiting to do HIDA scan until goals of care have been clarified. Met with patient at the bedside along with daughter Ena De La Torre. During our conversation, surgery came in/Dr. Saadia Fraga. Surgery is going to order the HIDA scan. Patient now going to be n.p.o. for the test.  Patient will determine what she would like to do along with her family after results of the HIDA scan. Also during our conversation, Dr. Dana Banks came in to see the patient. Danni Manuel is going to order an MRI of the patient's spine. Upon completion of both of these tests, patient and family will know how they would like to proceed with care. Explained palliative cares purpose and pamphlet given. Explained CODE STATUS levels in great detail and pamphlet given. Antonella stated that she likes the DNR CCA, and she will speak to the rest of the family. Patient is to stay a full CODE STATUS at this time. 8/26: spoke with daughter at bedside, who states the children are discussing code status options but for now wish patient to remain full code. Family lives very close to the patient and are very involved ni her life. Per daughter patient has begun to show signs of dementia, but is independent and likes living at home, daughter feels patient has good quality of life. Family wishes to pursue simple medical treatment, they don't want anything too invasive, but do wish patient to be treated. They are awaiting results of HIDA scan. - Family Meeting:   [facility] and patient   Family meeting was held to discuss:Code status, goals of care, prior expressed wishes and discharge plan    -Surrogate/Legal NOK: Child and Extended Family:    Contacts:    [facility], [address]  [place] [phone]  [facility]  [phone] 1st alternate     ROS: UNLESS STATED PATIENT DENIES:  CONSTITUTIONAL:  fever, chill, rigors, nausea, vomiting, fatigue.   HEENT: blurry vision, double vision, hearing problem, tinnitus, hoarseness, dysphagia, odynophagia  RESPIRATORY: cough, shortness of breath, sputum expectoration. CARDIOVASCULAR:  Chest pain/pressure, palpitation, syncope, irregular beats  GASTROINTESTINAL:  abdominal or rectal pain, diarrhea, constipation, . GENITOURINARY:  Burning, frequency, urgency, incontinence, discharge  INTEGUMENTARY: rash, wound, pruritis  HEMATOLOGIC/LYMPHATIC:  Swelling, sores, gum bleeding, easy bruising, pica.   MUSCULOSKELETAL:  pain, edema, joint swelling or redness  NEUROLOGICAL:  light headed, dizziness, loss of consciousness, weakness, change in memory, seizures, tremors        Objective:     Physical Exam  BP (!) 144/72   Pulse 94   Temp 98.4 °F (36.9 °C) (Temporal)   Resp 18   Ht 5' (1.524 m)   Wt 142 lb 7 oz (64.6 kg)   SpO2 95%   BMI 27.82 kg/m²     Gen:  Alert, appears stated age, well nourished, in no acute distress  HEENT:  Normocephalic, conjunctiva pink, no drainage, mucosa moist  Neck:  Supple  Lungs: Diminished bilaterally, no audible rhonchi or wheezes noted  Heart[de-identified]  RRR, + murmur, no rub, or gallop noted during exam  Abd:  Soft,  Tender- RLQ, mildly distended, BS+  :  Voids  Ext:  Moving all extremities,1+ edema, pulses present  Skin:  Warm and dry  Neuro:  PERRL, Alert, oriented x 3; following commands      Current Medications:  Inpatient medications reviewed: yes  Home Medications reviewed: yes    Results/Verification of Data Review  Objective data reviewed: labs, images, records, medication use, vitals and chart      - Advanced Directives: Living Will, HC-POA and Documents available in the [employer]   -  - Spiritual assessment: No spiritual distress identified     - Bereavement and grief: to be determined    - Discharge planning: to be determined    - Prognosis: unknown and per input of consults     - Referrals to: none today    Time/Communication  Greater than 51% of time spent, total 29 minutes in counseling and coordination of care at the bedside regarding goals of care. Assessment/Plan   1. Continue current treatment. Family plans to take patient home without Trell Pereira on discharge. 2. Urology- left ureteral tumor in 2016, on MRI lumbar spine noted dilated left ureter secondary to likely malignant mass at the level of the bladder  3. General surgery- HIDA scan no GB visualized, awaiting consulting service to determine management options  4. Neurosurgery-MRI of the thoracic spine degenerative changes, no significant stenosis, MRI lumbar spine degenerative changes, neural foraminal stenoses L2-4, L5-S1, no metastasis seen  5. Palliative care- goals-contingent upon results of HIDA scan and MRI of the spine, CODE STATUS- full code, patient's daughter to talk to family about CODE STATUS, pamphlet given      Dedrick Hahn DO  Palliative Medicine    Discussed patient and the plan of care with the other IDT members of Palliative Care, and with consultants, Primary Attending, patient, family and floor nurse. Thank you for allowing Palliative Medicine to participate in the care of [address] [place] [address].

## 2020-08-26 NOTE — PROGRESS NOTES
8/26/2020 3:30 PM  Service: Urology  Group: [facility] (Zana/Aguilar/Magy)    Nubia Calhoun  [id number]    Subjective:    Awake and alert  Denies difficulty urinating   Denies left or right flank pain   Continues to have right upper quadrant abdominal pain   HIDA scan positive  General surgery suggesting percutaneous cholecystectomy  Family still pending their decision  They still have questions regarding the procedure     Review of Systems  Constitutional: No fever or chills   Respiratory: negative for cough and hemoptysis  Cardiovascular: negative for chest pain and dyspnea  Gastrointestinal: positive for right upper quadrant abdominal pain   : See above  Derm: negative for rash and skin lesion(s)  Neurological: negative for seizures and tremors  Musculoskeletal: Negative    Psychiatric: Negative   All other reviews are negative      Scheduled Meds:   amLODIPine  2.5 mg Oral QAM    citalopram  10 mg Oral QAM    losartan  100 mg Oral Daily    Netarsudil Dimesylate  1 drop Ophthalmic Daily    simvastatin  20 mg Oral Nightly    sodium chloride flush  10 mL Intravenous 2 times per day    cefTRIAXone (ROCEPHIN) IV  1 g Intravenous Q24H    metroNIDAZOLE  500 mg Intravenous Q8H    latanoprost  1 drop Both Eyes Nightly    dorzolamide  1 drop Both Eyes BID    And    timolol  1 drop Both Eyes BID       Objective:  Vitals:    08/26/20 0745   BP: (!) 144/72   Pulse: 94   Resp: 18   Temp: 98.4 °F (36.9 °C)   SpO2: 95%         Allergies: Enalapril maleate; Penicillins; and Sulfa antibiotics    General Appearance: alert and oriented to person, place and time and in no acute distress  Skin: no rash or erythema  Head: normocephalic and atraumatic  Pulmonary/Chest: normal air movement, no respiratory distress  Abdomen: soft, non-tender, non-distended  Genitourinary: no gerard   Extremities: no cyanosis, clubbing or edema         Labs:     Recent Labs     08/26/20  0602      K 3.9      CO2 19* BUN 26*   CREATININE 1.6*   GLUCOSE 112*   CALCIUM 8.9       Lab Results   Component Value Date    HGB 11.2 08/26/2020    HCT 33.9 08/26/2020         Assessment/Plan:  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  Spoke to Dr. Sherif Mcnulty, we can always postpone our surgery another week to allow her to heal from cholecystectomy if need be  No acute  interventions planned at this time  We will follow peripherally and plan next surgery according to their decision regarding possible cholecystectomy      Nimesh Portillo, APRN - CNP   [facility]

## 2020-08-27 ENCOUNTER — APPOINTMENT (OUTPATIENT)
Dept: CT IMAGING | Age: 85
DRG: 982 | End: 2020-08-27
Payer: MEDICARE

## 2020-08-27 ENCOUNTER — HOSPITAL ENCOUNTER (OUTPATIENT)
Age: 85
Discharge: HOME | End: 2020-08-29
Payer: MEDICARE

## 2020-08-27 LAB
ANION GAP SERPL CALCULATED.3IONS-SCNC: 13 MMOL/L (ref 7–16)
BUN BLDV-MCNC: 21 MG/DL (ref 8–23)
CALCIUM SERPL-MCNC: 7.9 MG/DL (ref 8.6–10.2)
CHLORIDE BLD-SCNC: 109 MMOL/L (ref 98–107)
CO2: 18 MMOL/L (ref 22–29)
CREAT SERPL-MCNC: 1.2 MG/DL (ref 0.5–1)
GFR AFRICAN AMERICAN: 51
GFR NON-AFRICAN AMERICAN: 42 ML/MIN/1.73
GLUCOSE BLD-MCNC: 91 MG/DL (ref 74–99)
HCT VFR BLD CALC: 30.1 % (ref 34–48)
HEMOGLOBIN: 9.7 G/DL (ref 11.5–15.5)
INR BLD: 1.4
MCH RBC QN AUTO: 30.1 PG (ref 26–35)
MCHC RBC AUTO-ENTMCNC: 32.2 % (ref 32–34.5)
MCV RBC AUTO: 93.5 FL (ref 80–99.9)
PDW BLD-RTO: 13.7 FL (ref 11.5–15)
PLATELET # BLD: 177 E9/L (ref 130–450)
PMV BLD AUTO: 10.5 FL (ref 7–12)
POTASSIUM SERPL-SCNC: 3.4 MMOL/L (ref 3.5–5)
PROTHROMBIN TIME: 15.5 SEC (ref 9.3–12.4)
RBC # BLD: 3.22 E12/L (ref 3.5–5.5)
SODIUM BLD-SCNC: 140 MMOL/L (ref 132–146)
WBC # BLD: 14 E9/L (ref 4.5–11.5)

## 2020-08-27 PROCEDURE — 2500000003 HC RX 250 WO HCPCS: Performed by: INTERNAL MEDICINE

## 2020-08-27 PROCEDURE — 2580000003 HC RX 258: Performed by: INTERNAL MEDICINE

## 2020-08-27 PROCEDURE — 36415 COLL VENOUS BLD VENIPUNCTURE: CPT

## 2020-08-27 PROCEDURE — 87147 CULTURE TYPE IMMUNOLOGIC: CPT

## 2020-08-27 PROCEDURE — 0T578ZZ DESTRUCTION OF LEFT URETER, VIA NATURAL OR ARTIFICIAL OPENING ENDOSCOPIC: ICD-10-PCS | Performed by: UROLOGY

## 2020-08-27 PROCEDURE — 85027 COMPLETE CBC AUTOMATED: CPT

## 2020-08-27 PROCEDURE — C1729 CATH, DRAINAGE: HCPCS

## 2020-08-27 PROCEDURE — 6360000002 HC RX W HCPCS: Performed by: INTERNAL MEDICINE

## 2020-08-27 PROCEDURE — 99232 SBSQ HOSP IP/OBS MODERATE 35: CPT | Performed by: FAMILY MEDICINE

## 2020-08-27 PROCEDURE — 6370000000 HC RX 637 (ALT 250 FOR IP): Performed by: INTERNAL MEDICINE

## 2020-08-27 PROCEDURE — 87075 CULTR BACTERIA EXCEPT BLOOD: CPT

## 2020-08-27 PROCEDURE — 99232 SBSQ HOSP IP/OBS MODERATE 35: CPT | Performed by: NEUROLOGICAL SURGERY

## 2020-08-27 PROCEDURE — 87205 SMEAR GRAM STAIN: CPT

## 2020-08-27 PROCEDURE — 87186 SC STD MICRODIL/AGAR DIL: CPT

## 2020-08-27 PROCEDURE — 80048 BASIC METABOLIC PNL TOTAL CA: CPT

## 2020-08-27 PROCEDURE — 6360000004 HC RX CONTRAST MEDICATION: Performed by: RADIOLOGY

## 2020-08-27 PROCEDURE — 2060000000 HC ICU INTERMEDIATE R&B

## 2020-08-27 PROCEDURE — 85610 PROTHROMBIN TIME: CPT

## 2020-08-27 PROCEDURE — 87077 CULTURE AEROBIC IDENTIFY: CPT

## 2020-08-27 PROCEDURE — 6360000002 HC RX W HCPCS: Performed by: RADIOLOGY

## 2020-08-27 PROCEDURE — 87070 CULTURE OTHR SPECIMN AEROBIC: CPT

## 2020-08-27 PROCEDURE — 0F9430Z DRAINAGE OF GALLBLADDER WITH DRAINAGE DEVICE, PERCUTANEOUS APPROACH: ICD-10-PCS | Performed by: RADIOLOGY

## 2020-08-27 RX ORDER — FENTANYL CITRATE 50 UG/ML
INJECTION, SOLUTION INTRAMUSCULAR; INTRAVENOUS
Status: COMPLETED | OUTPATIENT
Start: 2020-08-27 | End: 2020-08-27

## 2020-08-27 RX ORDER — MIDAZOLAM HYDROCHLORIDE 1 MG/ML
INJECTION INTRAMUSCULAR; INTRAVENOUS
Status: COMPLETED | OUTPATIENT
Start: 2020-08-27 | End: 2020-08-27

## 2020-08-27 RX ORDER — POTASSIUM CHLORIDE 7.45 MG/ML
10 INJECTION INTRAVENOUS
Status: DISPENSED | OUTPATIENT
Start: 2020-08-27 | End: 2020-08-27

## 2020-08-27 RX ADMIN — DORZOLAMIDE HYDROCHLORIDE 1 DROP: 20 SOLUTION/ DROPS OPHTHALMIC at 20:35

## 2020-08-27 RX ADMIN — METRONIDAZOLE 500 MG: 500 INJECTION, SOLUTION INTRAVENOUS at 10:43

## 2020-08-27 RX ADMIN — TIMOLOL MALEATE 1 DROP: 5 SOLUTION OPHTHALMIC at 10:42

## 2020-08-27 RX ADMIN — TIMOLOL MALEATE 1 DROP: 5 SOLUTION OPHTHALMIC at 20:36

## 2020-08-27 RX ADMIN — LOSARTAN POTASSIUM 100 MG: 50 TABLET, FILM COATED ORAL at 10:43

## 2020-08-27 RX ADMIN — DORZOLAMIDE HYDROCHLORIDE 1 DROP: 20 SOLUTION/ DROPS OPHTHALMIC at 10:41

## 2020-08-27 RX ADMIN — POTASSIUM CHLORIDE 10 MEQ: 10 INJECTION, SOLUTION INTRAVENOUS at 10:56

## 2020-08-27 RX ADMIN — IOPAMIDOL 15 ML: 612 INJECTION, SOLUTION INTRAVENOUS at 15:46

## 2020-08-27 RX ADMIN — CEFTRIAXONE SODIUM 1 G: 1 INJECTION, POWDER, FOR SOLUTION INTRAMUSCULAR; INTRAVENOUS at 15:05

## 2020-08-27 RX ADMIN — METRONIDAZOLE 500 MG: 500 INJECTION, SOLUTION INTRAVENOUS at 17:47

## 2020-08-27 RX ADMIN — MIDAZOLAM 1 MG: 1 INJECTION INTRAMUSCULAR; INTRAVENOUS at 15:13

## 2020-08-27 RX ADMIN — ACETAMINOPHEN 650 MG: 325 TABLET, FILM COATED ORAL at 17:45

## 2020-08-27 RX ADMIN — AMLODIPINE BESYLATE 2.5 MG: 2.5 TABLET ORAL at 10:39

## 2020-08-27 RX ADMIN — FENTANYL CITRATE 50 MCG: 50 INJECTION, SOLUTION INTRAMUSCULAR; INTRAVENOUS at 15:13

## 2020-08-27 RX ADMIN — SIMVASTATIN 20 MG: 10 TABLET, FILM COATED ORAL at 20:36

## 2020-08-27 RX ADMIN — LATANOPROST 1 DROP: 50 SOLUTION OPHTHALMIC at 20:36

## 2020-08-27 RX ADMIN — POTASSIUM CHLORIDE 10 MEQ: 10 INJECTION, SOLUTION INTRAVENOUS at 12:02

## 2020-08-27 RX ADMIN — METRONIDAZOLE 500 MG: 500 INJECTION, SOLUTION INTRAVENOUS at 00:10

## 2020-08-27 RX ADMIN — CITALOPRAM HYDROBROMIDE 10 MG: 20 TABLET ORAL at 10:40

## 2020-08-27 RX ADMIN — Medication 10 ML: at 12:07

## 2020-08-27 ASSESSMENT — PAIN SCALES - GENERAL
PAINLEVEL_OUTOF10: 0
PAINLEVEL_OUTOF10: 0
PAINLEVEL_OUTOF10: 8
PAINLEVEL_OUTOF10: 5

## 2020-08-27 ASSESSMENT — PAIN DESCRIPTION - LOCATION: LOCATION: ABDOMEN

## 2020-08-27 ASSESSMENT — PAIN DESCRIPTION - PAIN TYPE: TYPE: ACUTE PAIN;SURGICAL PAIN

## 2020-08-27 NOTE — PROGRESS NOTES
Department of Neurosurgery  Progress Note    CHIEF COMPLAINT: pt seen for T12 and L1 lesion    SUBJECTIVE:  No back pain this AM    REVIEW OF SYSTEMS :  Constitutional: Negative for chills and fever. Neurological: Negative for dizziness, tremors and speech change.      OBJECTIVE:   VITALS:  BP (!) 118/59   Pulse 93   Temp 96.3 °F (35.7 °C) (Oral)   Resp 18   Ht 5' (1.524 m)   Wt 142 lb 7 oz (64.6 kg)   SpO2 96%   BMI 27.82 kg/m²   PHYSICAL:  CONSTITUTIONAL:  awake, alert, cooperative, no apparent distress, and appears stated age    DATA:  CBC:   Lab Results   Component Value Date    WBC 14.0 08/27/2020    RBC 3.22 08/27/2020    HGB 9.7 08/27/2020    HCT 30.1 08/27/2020    MCV 93.5 08/27/2020    MCH 30.1 08/27/2020    MCHC 32.2 08/27/2020    RDW 13.7 08/27/2020     08/27/2020    MPV 10.5 08/27/2020     BMP:    Lab Results   Component Value Date     08/27/2020    K 3.4 08/27/2020    K 3.9 10/31/2019     08/27/2020    CO2 18 08/27/2020    BUN 21 08/27/2020    LABALBU 3.9 08/24/2020    CREATININE 1.2 08/27/2020    CALCIUM 7.9 08/27/2020    GFRAA 51 08/27/2020    LABGLOM 42 08/27/2020    GLUCOSE 91 08/27/2020     PT/INR:    Lab Results   Component Value Date    PROTIME 15.5 08/27/2020    INR 1.4 08/27/2020     PTT:    Lab Results   Component Value Date    APTT 24.5 01/29/2015   [APTT}    Current Inpatient Medications  Current Facility-Administered Medications: ondansetron (ZOFRAN) injection 4 mg, 4 mg, Intravenous, Q6H PRN  amLODIPine (NORVASC) tablet 2.5 mg, 2.5 mg, Oral, QAM  citalopram (CELEXA) tablet 10 mg, 10 mg, Oral, QAM  losartan (COZAAR) tablet 100 mg, 100 mg, Oral, Daily  Netarsudil Dimesylate 0.02 % SOLN 1 drop, 1 drop, Ophthalmic, Daily  simvastatin (ZOCOR) tablet 20 mg, 20 mg, Oral, Nightly  sodium chloride flush 0.9 % injection 10 mL, 10 mL, Intravenous, 2 times per day  sodium chloride flush 0.9 % injection 10 mL, 10 mL, Intravenous, PRN  acetaminophen (TYLENOL) tablet 650 mg,

## 2020-08-27 NOTE — PRE SEDATION
Sedation Pre-Procedure Note    Patient Name: Nishant Dye   YOB: 1928  Room/Bed: 4760/2464-M  Medical Record Number: [id number]  Date: 8/27/2020   Time: 4:14 PM       Indication: 79 yo F with acute cholecystitis. Here for CT guided cholecystotomy tube placement. Consent: I have discussed with the patient and/or the patient representative the indication, alternatives, and the possible risks and/or complications of the planned procedure and the anesthesia methods. The patient and/or patient representative appear to understand and agree to proceed. Vital Signs:   Vitals:    08/27/20 1526   BP: (Abnormal) 168/90   Pulse: 77   Resp: 18   Temp:    SpO2: 91%       Past Medical History:   has a past medical history of Abdominal pain, Acute cholecystitis, Diet-controlled diabetes mellitus ([address] Gastritis, Glaucoma, Hematemesis, Hyperlipidemia, and Hypertension. Past Surgical History:   has a past surgical history that includes Hysterectomy; Upper gastrointestinal endoscopy (1/29/15); Cystocopy (04/04/2017); Cystoscopy (07/18/2017); Cystocopy (10/19/2017); pr cystourethroscopy,ureter catheter (Left, 4/17/2018); and CYSTOSCOPY INSERTION / REMOVAL STENT / STONE (Left, 8/20/2020).     Medications:   Scheduled Meds:    amLODIPine  2.5 mg Oral QAM    citalopram  10 mg Oral QAM    losartan  100 mg Oral Daily    Netarsudil Dimesylate  1 drop Ophthalmic Daily    simvastatin  20 mg Oral Nightly    sodium chloride flush  10 mL Intravenous 2 times per day    cefTRIAXone (ROCEPHIN) IV  1 g Intravenous Q24H    metroNIDAZOLE  500 mg Intravenous Q8H    latanoprost  1 drop Both Eyes Nightly    dorzolamide  1 drop Both Eyes BID    And    timolol  1 drop Both Eyes BID     Continuous Infusions:    sodium chloride 75 mL/hr at 08/26/20 1857     PRN Meds: ondansetron, sodium chloride flush, acetaminophen **OR** acetaminophen, polyethylene glycol, promethazine  Home Meds:   Prior to Admission medications Medication Sig Start Date End Date Taking? Authorizing Provider   citalopram (CELEXA) 10 MG tablet Take 1 tablet by mouth every morning 8/19/20  Yes Ganga Lujan DO   ammonium lactate (LAC-HYDRIN) 12 % lotion Apply topically daily. 4/3/20  Yes Ganga Lujan DO   Multiple Vitamins-Minerals (ONCOVITE PO) Take by mouth daily   Yes Historical Provider, MD   Cholecalciferol (VITAMIN D3) 50 MCG (2000 UT) CAPS Take 1 capsule by mouth daily 11/23/19  Yes Ganga Lujan DO   amLODIPine (NORVASC) 2.5 MG tablet Take 1 tablet by mouth every morning 11/11/19  Yes Ganga Lujan DO   calcium carbonate (TUMS) 500 MG chewable tablet Take 1 tablet by mouth daily   Yes Historical Provider, MD   bimatoprost (LUMIGAN) 0.01 % SOLN ophthalmic drops Place 1 drop into both eyes nightly   Yes Historical Provider, MD   dorzolamide-timolol (COSOPT) 22.3-6.8 MG/ML ophthalmic solution Place 1 drop into both eyes 2 times daily   Yes Historical Provider, MD   RHOPRESSA 0.02 % SOLN Apply 1 drop to eye daily Indications: administer one drop in both eyes every morning 10/30/19  Yes Historical Provider, MD   simvastatin (ZOCOR) 20 MG tablet Take 1 tablet by mouth nightly 8/20/19  Yes Ganga Lujan DO   losartan (COZAAR) 100 MG tablet Take 1 tablet by mouth daily 8/20/19  Yes Ganga Lujan DO   Handicap Placard MISC by Does not apply route Dx  Arthritis   5  yrs 8/6/20   Brandon Lujan DO     Coumadin Use Last 7 Days:  no  Antiplatelet drug therapy use last 7 days: no  Other anticoagulant use last 7 days: no  Additional Medication Information:  n/a      Pre-Sedation Documentation and Exam:   I have personally completed a history, physical exam & review of systems for this patient (see notes).     Mallampati Airway Assessment:  Mallampati Class II - (soft palate, fauces & uvula are visible)    Prior History of Anesthesia Complications:   none    ASA Classification:  Class 3 - A patient with severe systemic disease that limits activity but is not incapacitating    Sedation/ Anesthesia Plan:   intravenous sedation    Medications Planned:   midazolam (Versed) intravenously and fentanyl intravenously    Patient is an appropriate candidate for plan of sedation: yes    Electronically signed by Rob Mccoy MD on 8/27/2020 at 4:14 PM

## 2020-08-27 NOTE — CARE COORDINATION
SW spoke with Dtr about requesting choices for [facility] d/t to drain placement today. SW reviewed list of [facility] with Dtr d/t Pt was sleeping. Dtr choose 1. [facility], 2. [facility], 3, I. Dtr said that Pt has services through [facility] and she will notify CM that [facility] will be coming in the home. Discharge Plan is to return home with [facility] Waiver services and [facility] for drain care. PAYAL/CM to follow for discharge needs.    Jeanine Matos, L.S.W.  [phone]

## 2020-08-27 NOTE — INTERVAL H&P NOTE
Update History & Physical    The patient's History and Physical of August 25, 2020 was reviewed with the patient and I examined the patient. There was no change. The surgical site was confirmed by the patient and me. Plan: The risks, benefits, expected outcome, and alternative to the recommended procedure have been discussed with the patient. Patient understands and wants to proceed with the procedure.      Electronically signed by Kate Louis MD on 8/27/2020 at 4:14 PM

## 2020-08-27 NOTE — PLAN OF CARE
Problem: Pain:  Goal: Control of acute pain  Description: Control of acute pain  8/27/2020 0438 by Shawn Mancilla RN  Outcome: Met This Shift     Problem: Skin Integrity:  Goal: Absence of new skin breakdown  Description: Absence of new skin breakdown  8/27/2020 0438 by Shawn Mancilla RN  Outcome: Met This Shift     Problem: Falls - Risk of:  Goal: Will remain free from falls  Description: Will remain free from falls  8/27/2020 0438 by Shawn Mancilla RN  Outcome: Met This Shift

## 2020-08-27 NOTE — PLAN OF CARE
Problem: Pain:  Goal: Pain level will decrease  Description: Pain level will decrease  8/26/2020 2127 by Ronnie Castillo RN  Outcome: Met This Shift  8/26/2020 2022 by Lazarus Mines, RN  Outcome: Met This Shift  Goal: Control of acute pain  Description: Control of acute pain  8/26/2020 2127 by Ronnie Castillo RN  Outcome: Met This Shift  8/26/2020 2022 by Lazarus Mines, RN  Outcome: Met This Shift  Goal: Control of chronic pain  Description: Control of chronic pain  8/26/2020 2127 by Ronnie Castillo RN  Outcome: Met This Shift  8/26/2020 2022 by Lazarus Mines, RN  Outcome: Met This Shift     Problem: Skin Integrity:  Goal: Will show no infection signs and symptoms  Description: Will show no infection signs and symptoms  8/26/2020 2127 by Ronnie Castillo RN  Outcome: Met This Shift  8/26/2020 2022 by Lazarus Mines, RN  Outcome: Met This Shift  Goal: Absence of new skin breakdown  Description: Absence of new skin breakdown  8/26/2020 2127 by Ronnie Castillo RN  Outcome: Met This Shift  8/26/2020 2022 by Lazarus Mines, RN  Outcome: Met This Shift     Problem: Falls - Risk of:  Goal: Will remain free from falls  Description: Will remain free from falls  8/26/2020 2127 by Ronnie Castillo RN  Outcome: Met This Shift  8/26/2020 2022 by Lazarus Mines, RN  Outcome: Met This Shift  Goal: Absence of physical injury  Description: Absence of physical injury  8/26/2020 2127 by Ronnie Castillo RN  Outcome: Met This Shift  8/26/2020 2022 by Lazarus Mines, RN  Outcome: Met This Shift

## 2020-08-27 NOTE — PROGRESS NOTES
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, left hydronephrosis      Hospital course to date:  Initial evaluations have demonstrated lytic lesions in the lumbar spine and lower thoracic spine as well as a compression fracture of T12  Patient was evaluated by general surgery and is having a HIDA scan for possible cholecystitis because of tenderness in the right upper quadrant as well  Her vitals are stable  She did have a white cell count of 23.2 which is dropped to 18.9  And she has CKD with a BUN of 26 and a creatinine of 1.6      SUBJECTIVE:  Patient seen this morning in the presence of her daughter  All questions were addressed  Her testing is completed  Stress test was satisfactory  No new symptoms or concerns: Her daughter is the power of   The patient states that she currently feels okay  She states she has abdominal discomfort if pressed  Otherwise stable  She is for interventional radiology cholecystostomy    Patient is interactive and capable of expressing symptoms and needs    ROS:  Negative to a 10 system review except that mentioned in the HPI. Pertinent items are noted in HPI.          DIET:Diet NPO, After Midnight    Allergies   Allergen Reactions    Enalapril Maleate Other (See Comments)     Other reaction(s): cough/rash    Penicillins     Sulfa Antibiotics        MEDICATION SIDE EFFECTS:none    SCHEDULED MEDS:   Current Facility-Administered Medications   Medication Dose Route Frequency Provider Last Rate Last Dose    ondansetron (ZOFRAN) injection 4 mg  4 mg Intravenous Q6H PRN Sabra Gonzalez MD        amLODIPine (NORVASC) tablet 2.5 mg  2.5 mg Oral QAM Salud Al, DO   2.5 mg at 08/26/20 0850    citalopram (CELEXA) tablet 10 mg  10 mg Oral QAM [facility]   10 mg at 08/26/20 0849    losartan (COZAAR) tablet 100 mg  100 mg Oral Daily [facility], DO   100 mg at 08/26/20 0849    Netarsudil Dimesylate 0.02 % SOLN 1 drop  1 drop Ophthalmic Daily [facility]        simvastatin (ZOCOR) tablet 20 mg  20 mg Oral Nightly [facility], DO   20 mg at 08/26/20 2111    sodium chloride flush 0.9 % injection 10 mL  10 mL Intravenous 2 times per day [facility], DO   10 mL at 08/26/20 2111    sodium chloride flush 0.9 % injection 10 mL  10 mL Intravenous PRN [facility], DO        acetaminophen (TYLENOL) tablet 650 mg  650 mg Oral Q6H PRN [facility]   650 mg at 08/26/20 1735    Or    acetaminophen (TYLENOL) suppository 650 mg  650 mg Rectal Q6H PRN [facility], DO        polyethylene glycol (GLYCOLAX) packet 17 g  17 g Oral Daily PRN [facility] [place] [facility], DO        promethazine (PHENERGAN) tablet 12.5 mg  12.5 mg Oral Q6H PRN [facility], DO        0.9 % sodium chloride infusion   Intravenous Continuous [facility] [place] [facility], DO 75 mL/hr at 08/26/20 1857      cefTRIAXone (ROCEPHIN) 1 g in sterile water 10 mL IV syringe  1 g Intravenous Q24H [facility], DO   1 g at 08/26/20 2244    metronidazole (FLAGYL) 500 mg in NaCl 100 mL IVPB premix  500 mg Intravenous Q8H Salud Al, DO   Stopped at 08/27/20 0102    latanoprost (XALATAN) 0.005 % ophthalmic solution 1 drop  1 drop Both Eyes Nightly [facility] [place] Mal[facility], DO   1 drop at 08/26/20 2112    dorzolamide (TRUSOPT) 2 % ophthalmic solution 1 drop  1 drop Both Eyes BID Heath Raj Gilman DO   1 drop at 08/26/20 2112    And    timolol (TIMOPTIC) 0.5 % ophthalmic solution 1 drop  1 drop Both Eyes BID Tahmina Gilman, DO   1 drop at 08/26/20 2112       I have reviewed the patient's medications; see Medication Reconciliation. Current  Infusions   sodium chloride 75 mL/hr at 08/26/20 1857       Prn Medsondansetron, sodium chloride flush, acetaminophen **OR** acetaminophen, polyethylene glycol, promethazine     OBJECTIVE:  Wt Readings from Last 3 Encounters:   08/25/20 142 lb 7 oz (64.6 kg)   08/24/20 142 lb (64.4 kg)   08/20/20 144 lb (65.3 kg)     Temp Readings from Last 3 Encounters:   08/26/20 96.3 °F (35.7 °C) (Oral)   08/24/20 98.6 °F (37 °C)   08/21/20 98 °F (36.7 °C) (Oral)     BP Readings from Last 3 Encounters:   08/26/20 (!) 118/59   08/24/20 128/82   08/21/20 119/62     Pulse Readings from Last 3 Encounters:   08/26/20 93   08/24/20 72   08/21/20 72     @LASTSAO2(3)@      Intake/Output Summary (Last 24 hours) at 8/27/2020 1013  Last data filed at 8/27/2020 1003  Gross per 24 hour   Intake 642 ml   Output 300 ml   Net 342 ml       Weight:   Wt Readings from Last 3 Encounters:   08/25/20 142 lb 7 oz (64.6 kg)   08/24/20 142 lb (64.4 kg)   08/20/20 144 lb (65.3 kg)       Current Inpatient Medications:   amLODIPine  2.5 mg Oral QAM    citalopram  10 mg Oral QAM    losartan  100 mg Oral Daily    Netarsudil Dimesylate  1 drop Ophthalmic Daily    simvastatin  20 mg Oral Nightly    sodium chloride flush  10 mL Intravenous 2 times per day    cefTRIAXone (ROCEPHIN) IV  1 g Intravenous Q24H    metroNIDAZOLE  500 mg Intravenous Q8H    latanoprost  1 drop Both Eyes Nightly    dorzolamide  1 drop Both Eyes BID    And    timolol  1 drop Both Eyes BID       IV Infusions (if any):   sodium chloride 75 mL/hr at 08/26/20 1857         General appearance:   Skin: Skin color, texture, turgor normal. No rashes or lesions  HEENT: Head: Normocephalic, no lesions, without obvious abnormality. Eyes: Normal external eye, conjunctiva, lids cornea, CLARE.   Ears: Externally normal, hearing assessed  Mouth: symmetric, tongue appears normal, palate moves symmetrically  Neck: no adenopathy and thyroid not enlarged, symmetric, no tenderness/mass/nodules  Lungs: clear to auscultation, excursion is adequate, no abnormal sounds  Heart: S1S2 audible, sound normal. No rub or gallop. Abdomen: soft mildly-tender, no palpable organomegaly or masses, contours are normal  Extremities: Perfusion is adequate there is no edema  Neurologic: There is no focal neurologic abnormality  Cognition: She appears to be somewhat confused with regard to time otherwise she is interactive but slow. She is pleasant and cooperative    CBC:   Lab Results   Component Value Date    WBC 14.0 08/27/2020    RBC 3.22 08/27/2020    HGB 9.7 08/27/2020    HCT 30.1 08/27/2020    MCV 93.5 08/27/2020    MCH 30.1 08/27/2020    MCHC 32.2 08/27/2020    RDW 13.7 08/27/2020     08/27/2020    MPV 10.5 08/27/2020     CMP:    Lab Results   Component Value Date     08/27/2020    K 3.4 08/27/2020    K 3.9 10/31/2019     08/27/2020    CO2 18 08/27/2020    BUN 21 08/27/2020    CREATININE 1.2 08/27/2020    GFRAA 51 08/27/2020    LABGLOM 42 08/27/2020    GLUCOSE 91 08/27/2020    PROT 7.2 08/24/2020    LABALBU 3.9 08/24/2020    CALCIUM 7.9 08/27/2020    BILITOT 0.7 08/24/2020    ALKPHOS 91 08/24/2020    AST 21 08/24/2020    ALT 13 08/24/2020       DIAGNOSTIC/ LABORATORY DATA:  Labs:   CBC:   Recent Labs     08/26/20  0602 08/27/20  0630   WBC 18.9* 14.0*   HGB 11.2* 9.7*   HCT 33.9* 30.1*    177     BMP:   Recent Labs     08/26/20  0602 08/27/20  0630    140   K 3.9 3.4*   CO2 19* 18*   BUN 26* 21   CREATININE 1.6* 1.2*   LABGLOM 30 42   CALCIUM 8.9 7.9*     Mag: No results for input(s): MG in the last 72 hours. Phos: No results for input(s): PHOS in the last 72 hours. TSH: No results for input(s): TSH in the last 72 hours.   HgA1c:   Lab Results   Component Value Date    LABA1C 6.1 (H) 08/04/2020     No results found for: EAG    BNP: No results for input(s): BNP in the last 72 hours. PT/INR:   Recent Labs     08/27/20  0630   PROTIME 15.5*   INR 1.4     APTT:No results for input(s): APTT in the last 72 hours. CARDIAC ENZYMES:  Recent Labs     08/24/20  2154   TROPONINI <0.01     FASTING LIPID PANEL:  Lab Results   Component Value Date    CHOL 197 08/04/2020    HDL 50 08/04/2020    LDLCALC 109 08/04/2020    TRIG 191 08/04/2020     LIVER PROFILE:  Recent Labs     08/24/20  1657   AST 21   ALT 13   LABALBU 3.9       TELEMETRY: REVIEWED--Telemetry: Sinus    RADIOLOGY: REVIEWED AVAILABLE REPORT  NM HEPATOBILIARY   Final Result   Nonvisualization of the gallbladder suggesting cystic duct   obstruction. Patent common bile duct. MRI LUMBAR SPINE WO CONTRAST   Final Result      1. No evidence of metastatic disease. 2. The rounded lucent lesion seen in the T11 and L2 vertebral bodies   correspond to fat signal and may represent hemangioma or focal fatty   deposition. 3. Degenerative changes. No significant central canal stenosis. 4. Multilevel neural foraminal stenoses, worst (moderate) at bilateral   L2-3, L3-4 and L5-S1 levels. 5. Chronic anterior wedge compression deformity of the L1 vertebral   body with approximately 65% loss of height. MRI THORACIC SPINE WO CONTRAST   Final Result      1. No fracture or metastatic disease. 2. Degenerative changes, as described. No significant central canal or   neural foraminal stenosis. CT ABDOMEN PELVIS WO CONTRAST   Final Result      1. Redemonstration of mass associated with anterior aspect of the   urinary bladder as well as nodular thickening involving posterior   inferior wall of urinary bladder at origin of left ureter. These   findings are likely related to known urinary bladder neoplasm. 2. Stable left hydroureter and left hydronephrosis. 3. New gallbladder wall thickening with surrounding inflammatory   changes and multiple calcified gallstones. BOARD OF INTERNAL MEDICINE AND GERIATRIC MEDICINE  10:13 AM  8/27/2020

## 2020-08-27 NOTE — BRIEF OP NOTE
Brief Postoperative Note    Doug Murphy  YOB: 1928  [id number]    Pre-operative Diagnosis and Procedure:  81 yo F with acute cholecystitis. Here for CT guided cholecystotomy tube placement. Post-operative Diagnosis: Same    Anesthesia: Local    Estimated Blood Loss: < 10 cc    Surgeon: Rob Mccoy MD    Complications: none    Specimen obtained: biliary fluid sent for cultures    Findings: Successful CT guided cholecystostomy tube placement.      Rob Mccoy MD   8/27/2020 4:18 PM

## 2020-08-27 NOTE — PROGRESS NOTES
Palliative Care Department  Palliative Care Progress Note  Provider: [address] [place] [address] Day: 4    Referring Provider:  Ayleen Vargas MD     Reason for Consult:  [x]  Assist with goals of care    Chief Complaint: Yvrose Hobson is a 80 y.o. 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 ([facility] [address] [C79.49] 08/25/2020    Abdominal pain [R10.9] 08/24/2020    Bladder tumor [D49.4] 08/20/2020    Gross hematuria [R31.0] 08/20/2020    Kidney disease, chronic, stage III (GFR 30-59 ml/min) ([facility] [address] [N18.3] 08/17/2020    Ureteral carcinoma, left ([facility] [address] [C66.2] 08/06/2020    Cholelithiasis [K80.20] 02/18/2020    Acute cholecystitis [K81.0] 10/29/2019    Anxiety [F41.9] 08/20/2019    Essential hypertension [I10] 08/20/2019    Gastroesophageal reflux disease without esophagitis [K21.9] 08/20/2019    Hyperlipidemia [E78.5] 08/20/2019    Glaucoma [H40.9]            Palliative Care Encounter/Recommendations:      - Goals of care: to be determined- after HIDA scan and MRI of the spine are completed        - Code Status: full code     - Symptom Management: PER IM       Subjective:     HPI:  Yvrose Hobson is a 80 y.o. female with significant past medical history of gastritis, glaucoma, hematemesis, hyperlipidemia, hypertension, diet-controlled diabetes mellitus, acute cholecystitis, abdominal pain who presented with nausea and abdominal pain L low back, R upper quadrant. Noted to have multiple gallstones. Subjective/Events/Discussions:  Patient had procedure done a few days ago, but was unable to have her left ureter accessed at the time. Discharged with creatinine of 1.4. Now she has increased nausea and abdominal pain. Palliative care has been consulted for goals of care. Surgery team is waiting to do HIDA scan until goals of care have been clarified. Met with patient at the bedside along with daughter Brock Ellison. During our conversation, surgery came in/Dr. Kelle Brooks. Surgery is going to order the HIDA scan. Patient now going to be n.p.o. for the test.  Patient will determine what she would like to do along with her family after results of the HIDA scan. Also during our conversation, Dr. Corazon Meza came in to see the patient. Will Nadya is going to order an MRI of the patient's spine. Upon completion of both of these tests, patient and family will know how they would like to proceed with care. Explained palliative cares purpose and pamphlet given. Explained CODE STATUS levels in great detail and pamphlet given. Antonella stated that she likes the DNR CCA, and she will speak to the rest of the family. Patient is to stay a full CODE STATUS at this time. 8/26: spoke with daughter at bedside, who states the children are discussing code status options but for now wish patient to remain full code. Family lives very close to the patient and are very involved ni her life. Per daughter patient has begun to show signs of dementia, but is independent and likes living at home, daughter feels patient has good quality of life. Family wishes to pursue simple medical treatment, they don't want anything too invasive, but do wish patient to be treated. They are awaiting results of HIDA scan.    8/27: spoke with daughter Etta Myers at bedside, who states family had discussed code status and they want patient to be DNR-CCA. Discussed PM outpatient clinic, daughter didn't think patient needed that.   Their goal is home with [facility].    - Family Meeting:   Deion Dillard and patient   Family meeting was held to discuss:Code status, goals of care, prior expressed wishes and discharge plan    -Surrogate/Legal NOK: Child and Extended Family:    Contacts: [facility], [address] [place] [address] sister Patel Savage [phone]  [facility], [address] [facility] alternate     ROS: UNLESS STATED PATIENT DENIES:  CONSTITUTIONAL:  fever, chill, rigors, nausea, vomiting, fatigue. HEENT: blurry vision, double vision, hearing problem, tinnitus, hoarseness, dysphagia, odynophagia  RESPIRATORY: cough, shortness of breath, sputum expectoration. CARDIOVASCULAR:  Chest pain/pressure, palpitation, syncope, irregular beats  GASTROINTESTINAL:  abdominal or rectal pain, diarrhea, constipation, . GENITOURINARY:  Burning, frequency, urgency, incontinence, discharge  INTEGUMENTARY: rash, wound, pruritis  HEMATOLOGIC/LYMPHATIC:  Swelling, sores, gum bleeding, easy bruising, pica.   MUSCULOSKELETAL:  pain, edema, joint swelling or redness  NEUROLOGICAL:  light headed, dizziness, loss of consciousness, weakness, change in memory, seizures, tremors        Objective:     Physical Exam  /68   Pulse 98   Temp 97 °F (36.1 °C) (Temporal)   Resp 16   Ht 5' (1.524 m)   Wt 142 lb 7 oz (64.6 kg)   SpO2 98%   BMI 27.82 kg/m²     Gen:  Sleeping, easily arousable, appears younger than stated age, well nourished, in no acute distress  HEENT:  Normocephalic, conjunctiva pink, no drainage, mucosa moist  Neck:  Supple  Lungs: Diminished bilaterally, no audible rhonchi or wheezes noted  Heart[de-identified]  RRR, + murmur, no rub, or gallop noted during exam  Abd:  Soft,  Tender- RLQ, mildly distended, BS+  :  Voids  Ext:  Moving all extremities,1+ edema, pulses present  Skin:  Warm and dry  Neuro:  PERRL, Alert, oriented x 3; following commands      Current Medications:  Inpatient medications reviewed: yes  Home Medications reviewed: yes    Results/Verification of Data Review  Objective data reviewed: labs, images, records, medication use, vitals and chart      - Advanced Directives: Living Will, HC-POA and Documents available in the [employer]   -  - Spiritual assessment: No spiritual distress identified - Bereavement and grief: to be determined    - Discharge planning: to be determined    - Prognosis: unknown and per input of consults     - Referrals to: none today    Time/Communication  Greater than 51% of time spent, total 26 minutes in counseling and coordination of care at the bedside regarding goals of care. Assessment/Plan   1. Continue current treatment. Family plans to take patient home without [facility] on discharge. 2. Urology- left ureteral tumor in 2016, on MRI lumbar spine noted dilated left ureter secondary to likely malignant mass at the level of the bladder  3. General surgery- HIDA scan no GB visualized, to have drain placed in IR today  4. Neurosurgery-MRI of the thoracic spine degenerative changes, no significant stenosis, MRI lumbar spine degenerative changes, neural foraminal stenoses L2-4, L5-S1, no metastasis seen  Palliative care- goals-contingent upon results of HIDA scan and MRI of the spine, CODE STATUS- DNR-CCA    No further PM needs identified. Code status established. GOC established. Will sign off at this time. Please re-consult if further needs arise. Thank you. [address] [place] [address]    Discussed patient and the plan of care with the other IDT members of Palliative Care, and with consultants, Primary Attending, patient, family and floor nurse. Thank you for allowing Palliative Medicine to participate in the care of [address] [place] [address].

## 2020-08-28 ENCOUNTER — TELEPHONE (OUTPATIENT)
Dept: PRIMARY CARE CLINIC | Age: 85
End: 2020-08-28

## 2020-08-28 VITALS
SYSTOLIC BLOOD PRESSURE: 137 MMHG | TEMPERATURE: 97.7 F | RESPIRATION RATE: 16 BRPM | HEART RATE: 83 BPM | HEIGHT: 60 IN | WEIGHT: 142.44 LBS | OXYGEN SATURATION: 92 % | BODY MASS INDEX: 27.96 KG/M2 | DIASTOLIC BLOOD PRESSURE: 66 MMHG

## 2020-08-28 LAB
ANION GAP SERPL CALCULATED.3IONS-SCNC: 15 MMOL/L (ref 7–16)
BUN BLDV-MCNC: 20 MG/DL (ref 8–23)
CALCIUM SERPL-MCNC: 9.2 MG/DL (ref 8.6–10.2)
CHLORIDE BLD-SCNC: 106 MMOL/L (ref 98–107)
CO2: 15 MMOL/L (ref 22–29)
CREAT SERPL-MCNC: 1.1 MG/DL (ref 0.5–1)
GFR AFRICAN AMERICAN: 56
GFR NON-AFRICAN AMERICAN: 46 ML/MIN/1.73
GLUCOSE BLD-MCNC: 92 MG/DL (ref 74–99)
GRAM STAIN ORDERABLE: NORMAL
HCT VFR BLD CALC: 35.4 % (ref 34–48)
HEMOGLOBIN: 11.4 G/DL (ref 11.5–15.5)
MCH RBC QN AUTO: 30.3 PG (ref 26–35)
MCHC RBC AUTO-ENTMCNC: 32.2 % (ref 32–34.5)
MCV RBC AUTO: 94.1 FL (ref 80–99.9)
PDW BLD-RTO: 13.6 FL (ref 11.5–15)
PLATELET # BLD: 243 E9/L (ref 130–450)
PMV BLD AUTO: 10.4 FL (ref 7–12)
POTASSIUM SERPL-SCNC: 4.3 MMOL/L (ref 3.5–5)
RBC # BLD: 3.76 E12/L (ref 3.5–5.5)
SODIUM BLD-SCNC: 136 MMOL/L (ref 132–146)
WBC # BLD: 15.8 E9/L (ref 4.5–11.5)

## 2020-08-28 PROCEDURE — 80048 BASIC METABOLIC PNL TOTAL CA: CPT

## 2020-08-28 PROCEDURE — 6360000002 HC RX W HCPCS: Performed by: INTERNAL MEDICINE

## 2020-08-28 PROCEDURE — 2580000003 HC RX 258: Performed by: INTERNAL MEDICINE

## 2020-08-28 PROCEDURE — 36415 COLL VENOUS BLD VENIPUNCTURE: CPT

## 2020-08-28 PROCEDURE — 85027 COMPLETE CBC AUTOMATED: CPT

## 2020-08-28 PROCEDURE — 6370000000 HC RX 637 (ALT 250 FOR IP): Performed by: INTERNAL MEDICINE

## 2020-08-28 PROCEDURE — 2500000003 HC RX 250 WO HCPCS: Performed by: INTERNAL MEDICINE

## 2020-08-28 PROCEDURE — 2700000000 HC OXYGEN THERAPY PER DAY

## 2020-08-28 RX ORDER — METRONIDAZOLE 250 MG/1
250 TABLET ORAL 3 TIMES DAILY
Qty: 9 TABLET | Refills: 0 | Status: SHIPPED | OUTPATIENT
Start: 2020-08-28 | End: 2020-08-31

## 2020-08-28 RX ORDER — CEFDINIR 300 MG/1
300 CAPSULE ORAL 2 TIMES DAILY
Qty: 6 CAPSULE | Refills: 0 | Status: SHIPPED | OUTPATIENT
Start: 2020-08-28 | End: 2020-08-31

## 2020-08-28 RX ADMIN — AMLODIPINE BESYLATE 2.5 MG: 2.5 TABLET ORAL at 09:13

## 2020-08-28 RX ADMIN — METRONIDAZOLE 500 MG: 500 INJECTION, SOLUTION INTRAVENOUS at 02:15

## 2020-08-28 RX ADMIN — METRONIDAZOLE 500 MG: 500 INJECTION, SOLUTION INTRAVENOUS at 10:13

## 2020-08-28 RX ADMIN — CITALOPRAM HYDROBROMIDE 10 MG: 20 TABLET ORAL at 09:14

## 2020-08-28 RX ADMIN — DORZOLAMIDE HYDROCHLORIDE 1 DROP: 20 SOLUTION/ DROPS OPHTHALMIC at 11:28

## 2020-08-28 RX ADMIN — TIMOLOL MALEATE 1 DROP: 5 SOLUTION OPHTHALMIC at 11:29

## 2020-08-28 RX ADMIN — LOSARTAN POTASSIUM 100 MG: 50 TABLET, FILM COATED ORAL at 09:14

## 2020-08-28 RX ADMIN — ACETAMINOPHEN 650 MG: 325 TABLET, FILM COATED ORAL at 11:28

## 2020-08-28 RX ADMIN — CEFTRIAXONE SODIUM 1 G: 1 INJECTION, POWDER, FOR SOLUTION INTRAMUSCULAR; INTRAVENOUS at 00:26

## 2020-08-28 RX ADMIN — ACETAMINOPHEN 650 MG: 325 TABLET, FILM COATED ORAL at 00:27

## 2020-08-28 ASSESSMENT — PAIN SCALES - GENERAL
PAINLEVEL_OUTOF10: 0
PAINLEVEL_OUTOF10: 7
PAINLEVEL_OUTOF10: 6

## 2020-08-28 NOTE — CARE COORDINATION
PAYAL faxed script for saline flushes to [facility]. Kiran Liu said that Nurse will be out on Sunday to see Pt. PAYAL informed Floor Nurse that Kiko will see Pt on Sunday and to instruct Family on how to flush drain. James Miller will deliver flushes on Saturday.

## 2020-08-28 NOTE — PROGRESS NOTES
8/28/2020 12:24 PM  Service: Urology  Group: [facility] (Zana/Aguilar/Magy)    Nishant Dye  [id number]    Subjective:    Sitting up in bed  Awake and alert  Appears tired  Her son is present  Denies difficulty urinating  No fever or chills   Had CT guided cholecystostomy tube placed yesterday       Review of Systems  Constitutional: No fever or chills   Respiratory: negative for cough and hemoptysis  Cardiovascular: negative for chest pain and dyspnea  Gastrointestinal: abdominal soreness   : See above  Derm: negative for rash and skin lesion(s)  Neurological: negative for seizures and tremors  Musculoskeletal: Negative    Psychiatric: Negative   All other reviews are negative      Scheduled Meds:   amLODIPine  2.5 mg Oral QAM    citalopram  10 mg Oral QAM    losartan  100 mg Oral Daily    Netarsudil Dimesylate  1 drop Ophthalmic Daily    simvastatin  20 mg Oral Nightly    sodium chloride flush  10 mL Intravenous 2 times per day    cefTRIAXone (ROCEPHIN) IV  1 g Intravenous Q24H    metroNIDAZOLE  500 mg Intravenous Q8H    latanoprost  1 drop Both Eyes Nightly    dorzolamide  1 drop Both Eyes BID    And    timolol  1 drop Both Eyes BID       Objective:  Vitals:    08/28/20 0900   BP: 137/66   Pulse: 83   Resp: 16   Temp: 97.7 °F (36.5 °C)   SpO2: 92%         Allergies: Enalapril maleate; Penicillins; and Sulfa antibiotics    General Appearance: alert and oriented to person, place and time and in no acute distress  Skin: no rash or erythema  Head: normocephalic and atraumatic  Pulmonary/Chest: normal air movement, no respiratory distress  Abdomen: soft, appropriately tender  Genitourinary: no gerard   Extremities: no cyanosis, clubbing or edema         Labs:     Recent Labs     08/28/20  0635      K 4.3      CO2 15*   BUN 20   CREATININE 1.1*   GLUCOSE 92   CALCIUM 9.2       Lab Results   Component Value Date    HGB 11.4 08/28/2020    HCT 35.4 08/28/2020 Assessment/Plan:  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 Cynthia Waldrop that is scheduled next week  We will plan to postpone surgery another week  Our office will contact them to schedule  cystoscopy,bladder biopsy, left ureteroscopy, and possible left stent surgery for about 2 weeks from now rather than on 9/3.    No acute  interventions planned at this time  Please call with any further questions or concerns     Nora Freeman, APRN - CNP   [facility]

## 2020-08-28 NOTE — CARE COORDINATION
SW checked with Charge Nurse about sending flushes with Pt d/t [facility] not providing them. Charge Nurse informed SW that Hospital does not provide flushes. SW made referral to [address] [place] [address] about NS flushes for drain care. Kavitha Celis will check into and let SW know if they can do it.    Nasima Pringle, L.S.W.  [phone]

## 2020-08-28 NOTE — PROGRESS NOTES
[facility]   Department of Pharmacy   Pharmacist Transition of Care Services         Patient Demographics  Name:  Nubia Calhoun   Medical Record Number:  [id number]  Gender:  female   Age:  80 y.o. YOB: 1928    Primary Care Physician: Eliezer Ziegler DO  Primary Care Physician phone number:  [phone]  Readmission Risk (% from EPIC Patient List): 17 %       Pharmacist Review and Summary of Medications     Date of last reviewed/update: 8/28/20    Category Comments   New Medication Started   1. Flagyl 250mg PO TID  2. omnicef 300mg PO BID         Change in Outpatient Medication  (Dosage Form, Route,   Dose, or Frequency) 1. Discontinued Outpatient Medication   (or on Hold During Admission) 1. Other              Pharmacist Patient Education:    Date  Person Educated Content of Education                 Documentation of Pharmacist Interventions and Follow-up Plan:     The following Pharmacist Transition of Care Services were completed:   [x]  Reviewed and summarized medication changes  []  Entire home medication list was reviewed for accuracy (sources:  []  Home medication list was updated or corrected     [x]  Reviewed discharge medication reconciliation  []  Discharge medication list was updated or corrected  []  Patient education was provided on new medications  []  Patient education was provided on medication changes  []  Reviewed the After Visit Summary (AVS) with patient    Additional Interventions:  []  Inpatient prescriber was contacted and the following pharmacy recommendations        were accepted: **     [] Other interventions: **        Pharmacist: Smiley Barragan PharmD, [facility]  Date:  8/28/2020 12:46 PM

## 2020-08-28 NOTE — DISCHARGE SUMMARY
cooperative    Discharge Medications:    Bridget Garden   Home Medication Instructions TEP:[id number]    Printed on:08/28/20 7223   Medication Information                      amLODIPine (NORVASC) 2.5 MG tablet  Take 1 tablet by mouth every morning             ammonium lactate (LAC-HYDRIN) 12 % lotion  Apply topically daily. bimatoprost (LUMIGAN) 0.01 % SOLN ophthalmic drops  Place 1 drop into both eyes nightly             Cholecalciferol (VITAMIN D3) 50 MCG (2000 UT) CAPS  Take 1 capsule by mouth daily             citalopram (CELEXA) 10 MG tablet  Take 1 tablet by mouth every morning             dorzolamide-timolol (COSOPT) 22.3-6.8 MG/ML ophthalmic solution  Place 1 drop into both eyes 2 times daily             losartan (COZAAR) 100 MG tablet  Take 1 tablet by mouth daily             Multiple Vitamins-Minerals (ONCOVITE PO)  Take by mouth daily             RHOPRESSA 0.02 % SOLN  Apply 1 drop to eye daily Indications: administer one drop in both eyes every morning             simvastatin (ZOCOR) 20 MG tablet  Take 1 tablet by mouth nightly                 Discharge Exam:  General appearance:   Skin: Skin color, texture, turgor normal. No rashes or lesions  HEENT: Head: Normocephalic, no lesions, without obvious abnormality. Eyes: Normal external eye, conjunctiva, lids cornea, CLARE. Ears: Externally normal, hearing assessed  Mouth: symmetric, tongue appears normal, palate moves symmetrically  Neck: no adenopathy and thyroid not enlarged, symmetric, no tenderness/mass/nodules  Lungs: clear to auscultation, excursion is adequate, no abnormal sounds  Heart: S1S2 audible, sound normal. No rub or gallop.    Abdomen: soft mildly-tender, no palpable organomegaly or masses, contours are normal drainage tube in place  Extremities: Perfusion is adequate there is no edema  Neurologic: There is no focal neurologic abnormality  Cognition: She appears to be somewhat confused with regard to time otherwise

## 2020-08-28 NOTE — PROGRESS NOTES
Messaged OC to notify that patient will need a script for the saline flushes before discharge     Notified again that this is pending discharge @ [phone]

## 2020-08-28 NOTE — PROGRESS NOTES
Attempted to call  to notify patient needs a script for normal saline to flush gisel drain. No answer.

## 2020-08-28 NOTE — PROGRESS NOTES
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 It is draining her abdomen is a little sore other than that there are no complaints  Her vitals are satisfactory white cell count remains elevated at 15.8  Hemoglobin stable  Vitals are quite stable no fever no oxygen requirement        SUBJECTIVE:  Patient seen this morning in the presence of her son  All questions were addressed  Her testing is completed  Stress test was satisfactory  No new symptoms or concerns: The patient states that she currently feels okay  She states she has abdominal discomfort if pressed  Otherwise stable      Patient is interactive and capable of expressing symptoms and needs    ROS:  Negative to a 10 system review except that mentioned in the HPI. Pertinent items are noted in HPI.          DIET:DIET GENERAL;    Allergies   Allergen Reactions    Enalapril Maleate Other (See Comments)     Other reaction(s): cough/rash    Penicillins     Sulfa Antibiotics        MEDICATION SIDE EFFECTS:none    SCHEDULED MEDS:   Current Facility-Administered Medications   Medication Dose Route Frequency Provider Last Rate Last Dose    ondansetron (ZOFRAN) injection 4 mg  4 mg Intravenous Q6H PRN Mick Troy MD        amLODIPine (NORVASC) tablet 2.5 mg  2.5 mg Oral QAM Vane Gilman, DO   2.5 mg at 08/28/20 0913    citalopram (CELEXA) tablet 10 mg  10 mg Oral QAM Vane Gilman, DO   10 mg at 08/28/20 0914    losartan (COZAAR) tablet 100 mg  100 mg Oral Daily Vane Kinza Gilman, DO   100 mg at 08/28/20 0914    Netarsudil Dimesylate 0.02 % SOLN 1 drop  1 drop Ophthalmic Daily Vane Kinza Gilman, DO   1 drop at 08/27/20 1044    simvastatin (ZOCOR) tablet 20 mg  20 mg Oral Nightly Vane Gilman, DO   20 mg at 08/27/20 2036    sodium chloride flush 0.9 % injection 10 mL  10 mL Intravenous 2 times per day Vane Gilman, DO   10 mL at 08/27/20 1207    sodium chloride flush 0.9 % injection 10 mL  10 mL Intravenous PRN Vane Gilman, DO        acetaminophen (TYLENOL) tablet 650 mg  650 mg Oral Q6H PRN Vane Gilman, DO   650 mg at 08/28/20 1694    Or    acetaminophen (TYLENOL) suppository 650 mg  650 mg Rectal Q6H PRN Vane Gilman, DO        polyethylene glycol (GLYCOLAX) packet 17 g  17 g Oral Daily PRN Vane Gilman, DO        promethazine (PHENERGAN) tablet 12.5 mg  12.5 mg Oral Q6H PRN Vane Gilman, DO        0.9 % sodium chloride infusion   Intravenous Continuous Vane Gilman, DO 75 mL/hr at 08/26/20 1857      cefTRIAXone (ROCEPHIN) 1 g in sterile water 10 mL IV syringe  1 g Intravenous Q24H Vane Gilman, DO   1 g at 08/28/20 0026    metronidazole (FLAGYL) 500 mg in NaCl 100 mL IVPB premix  500 mg Intravenous Q8H Vane Gilman,    Stopped at 08/28/20 0315    latanoprost (XALATAN) 0.005 % ophthalmic solution 1 drop  1 drop Both Eyes Nightly Vane Gilman DO   1 drop turgor normal. No rashes or lesions  HEENT: Head: Normocephalic, no lesions, without obvious abnormality. Eyes: Normal external eye, conjunctiva, lids cornea, CLARE. Ears: Externally normal, hearing assessed  Mouth: symmetric, tongue appears normal, palate moves symmetrically  Neck: no adenopathy and thyroid not enlarged, symmetric, no tenderness/mass/nodules  Lungs: clear to auscultation, excursion is adequate, no abnormal sounds  Heart: S1S2 audible, sound normal. No rub or gallop. Abdomen: soft mildly-tender, no palpable organomegaly or masses, contours are normal drainage tube in place  Extremities: Perfusion is adequate there is no edema  Neurologic: There is no focal neurologic abnormality  Cognition: She appears to be somewhat confused with regard to time otherwise she is interactive but slow.   She is pleasant and cooperative    CBC:   Lab Results   Component Value Date    WBC 15.8 08/28/2020    RBC 3.76 08/28/2020    HGB 11.4 08/28/2020    HCT 35.4 08/28/2020    MCV 94.1 08/28/2020    MCH 30.3 08/28/2020    MCHC 32.2 08/28/2020    RDW 13.6 08/28/2020     08/28/2020    MPV 10.4 08/28/2020     CMP:    Lab Results   Component Value Date     08/28/2020    K 4.3 08/28/2020    K 3.9 10/31/2019     08/28/2020    CO2 15 08/28/2020    BUN 20 08/28/2020    CREATININE 1.1 08/28/2020    GFRAA 56 08/28/2020    LABGLOM 46 08/28/2020    GLUCOSE 92 08/28/2020    PROT 7.2 08/24/2020    LABALBU 3.9 08/24/2020    CALCIUM 9.2 08/28/2020    BILITOT 0.7 08/24/2020    ALKPHOS 91 08/24/2020    AST 21 08/24/2020    ALT 13 08/24/2020       DIAGNOSTIC/ LABORATORY DATA:  Labs:   CBC:   Recent Labs     08/27/20  0630 08/28/20  0635   WBC 14.0* 15.8*   HGB 9.7* 11.4*   HCT 30.1* 35.4    243     BMP:   Recent Labs     08/27/20  0630 08/28/20  0635    136   K 3.4* 4.3   CO2 18* 15*   BUN 21 20   CREATININE 1.2* 1.1*   LABGLOM 42 46   CALCIUM 7.9* 9.2     Mag: No results for input(s): MG in the last 72 hours.  Phos: No results for input(s): PHOS in the last 72 hours. TSH: No results for input(s): TSH in the last 72 hours. HgA1c:   Lab Results   Component Value Date    LABA1C 6.1 (H) 08/04/2020     No results found for: EAG    BNP: No results for input(s): BNP in the last 72 hours. PT/INR:   Recent Labs     08/27/20  0630   PROTIME 15.5*   INR 1.4     APTT:No results for input(s): APTT in the last 72 hours. CARDIAC ENZYMES:  No results for input(s): CKTOTAL, CKMB, CKMBINDEX, TROPONINI in the last 72 hours. FASTING LIPID PANEL:  Lab Results   Component Value Date    CHOL 197 08/04/2020    HDL 50 08/04/2020    LDLCALC 109 08/04/2020    TRIG 191 08/04/2020     LIVER PROFILE:  No results for input(s): AST, ALT, LABALBU in the last 72 hours. TELEMETRY: REVIEWED--Telemetry: Sinus    RADIOLOGY: REVIEWED AVAILABLE REPORT  NM HEPATOBILIARY   Final Result   Nonvisualization of the gallbladder suggesting cystic duct   obstruction. Patent common bile duct. MRI LUMBAR SPINE WO CONTRAST   Final Result      1. No evidence of metastatic disease. 2. The rounded lucent lesion seen in the T11 and L2 vertebral bodies   correspond to fat signal and may represent hemangioma or focal fatty   deposition. 3. Degenerative changes. No significant central canal stenosis. 4. Multilevel neural foraminal stenoses, worst (moderate) at bilateral   L2-3, L3-4 and L5-S1 levels. 5. Chronic anterior wedge compression deformity of the L1 vertebral   body with approximately 65% loss of height. MRI THORACIC SPINE WO CONTRAST   Final Result      1. No fracture or metastatic disease. 2. Degenerative changes, as described. No significant central canal or   neural foraminal stenosis. CT ABDOMEN PELVIS WO CONTRAST   Final Result      1.  Redemonstration of mass associated with anterior aspect of the   urinary bladder as well as nodular thickening involving posterior   inferior wall of urinary bladder at origin of left ureter. These   findings are likely related to known urinary bladder neoplasm. 2. Stable left hydroureter and left hydronephrosis. 3. New gallbladder wall thickening with surrounding inflammatory   changes and multiple calcified gallstones. Findings could suggest   acute cholecystitis. 4. Stable small free fluid is located within the pelvis. 5. Inflammatory changes surrounding the urinary bladder suggestive of   cystitis. 6. Diverticulosis without evidence of acute diverticulitis. 7. Redemonstration of lytic lesions involving L2 and T11 vertebral   bodies. 8. Stable compression fracture involving L1.       XR CHEST PORTABLE   Final Result   Tortuous ectatic aorta   Cardiomegaly    Airspace disease compatible with atelectasis or pneumonia, mild, at   both lung bases   The chest appears to be worse in the interval                  CT PTC NEW ACCESS    (Results Pending)   CT ABSCESS CATHETER FOLLOW UP    (Results Pending)       CONSULTANT NOTES AND RECOMMENDATIONS REVIEWED:    CURRENT ASSESSMENT:        Problem List:  Patient Active Problem List   Diagnosis    Glaucoma    Gastric mass    Localized osteoarthritis of right knee    Essential hypertension    Hyperlipidemia    Gastroesophageal reflux disease without esophagitis    Anxiety    Acute cholecystitis    Nonrheumatic aortic valve stenosis    Nonrheumatic aortic valve insufficiency    Cholelithiasis    Ureteral carcinoma, left (HCC)    Kidney disease, chronic, stage III (GFR 30-59 ml/min) (HCC)    Gross hematuria    Bladder tumor    Ureteral tumor    Abdominal pain    UTI (urinary tract infection)    Hydronephrosis    Hydroureter    Lesion of lumbar spine    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

## 2020-08-28 NOTE — PROGRESS NOTES
CLINICAL PHARMACY NOTE: MEDS TO [address] [place] [address] Drive Select Patient?: No  Total # of Prescriptions Filled: 2   The following medications were delivered to the patient:  · Cefdinir 300 mg   · Metronidazole 250 mg     Total # of Interventions Completed: 2  Time Spent (min): 30    Additional Documentation:

## 2020-08-28 NOTE — CARE COORDINATION
Nurse informed  that Discharge Order is in. PAYAL checked with Nurse shreya make sure [facility] order was in with drain care with flushes if needed. Nurse will make sure order is in.  PAYAL notified [address] [facility] with drain care instructions under nurse communication 11:11. Discharge Plan is to return home with [facility] and [address] [place] [address].    Rosaline Dumas, L.S.W.  [phone]

## 2020-08-28 NOTE — TELEPHONE ENCOUNTER
Received call from [facility], they are requesting either a written order or a verbal order flushes for her drain.     # [phone] opt 1, opt 1 again  Fax# [phone]

## 2020-08-31 NOTE — H&P
URINE CYTOLOGY ON 4/17/18 WAS NEGATIVE   4/4/17: URINE CYTOLOGY WAS NEGATIVE   URINE CYTOLOGY ON 10/25/16 WAS NEGATIVE   URINE CULTURE ON 4/4/17 GREW ENTEROCOCCUS AND PSEUDOMONAS AND WAS TREATED WITH ANTIBIOTICS WHICH SHE FINISHED   2/22/17: COMPLETED 6 WEEKLY BCG TREATMENTS   + NOCTURIA X1   - HEMATURIA   - PAIN   LASIX RENAL SCAN 11/2/16 WAS NORMAL WITH 49% LEFT AND 51% LEFT RENAL FUNCTION   PATHOLOGY FROM LEFT URETERAL TUMOR BIOPSY ON 10/25/16 WAS POSITIVE FOR LOW GRADE PAPILLARY UROTHELIAL CARCINOMA, NON INVASIVE   CT SCAN ON 9/19/16 SHOWED LEFT HYDRONEPHROSIS AND AN ENHANCING LEFT DISTAL URETERAL TUMOR (NO METS)   BUN AND CREATININE ON 10/30/19 WERE 14 AND 0.8, RESPECTIVELY   FOUND TO HAVE EXTENSIVE BLADDER AND LEFT URETERAL TUMOR RECURRENCE RECENTLY  HAD A GALLBLADDER DRAIN PLACED FOR ACUTE CHOLECYSTITIS       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 The problem is on the left side. Her cancer was diagnosed 4 years ago. She has had laser ablation to treat her transitional cell carcinoma. She has not received radiation therapy. She did not receive chemotherapy for her cancer. She did not see blood in her urine. She is not having pain in new locations. She does have a good appetite. Her bowels are moving normally. She has not recently had unwanted weight loss. Her last cysto was 2 years ago. Her last radiologic test to evaluate the kidneys was 4 years ago.         ALLERGIES: Enalapril Maleate  Sulfa      MEDICATIONS: Oncovite tablet 2 tablet PO BID   Amlodipine Besilate   Losartan Potassium   Lumigan   Rhopressa   Simvastatin   Timolol Maleate   Vitamin D3        PSH: Bladder Instill AntiCA Agent - 2017, 2017, 2017, 2017, 2017, 2017  Cysto Uretero Biopsy Fulgura, Left, W/ LEFT JJ URETERAL STENT INSERTION - 2016  Cysto Uretero W/excise Tumor, Left, incontinence, painful urination, blood in urine, gerard catheter, and suprapubic catheter. Musculoskeletal:  Patient denies using wheelchair, using walker, and using cane. Integumentary/Skin:  Patient denies rash, persistent itching, and skin cancer history. Neurological:  Patient denies numbness, tingling, dizziness, and altered mental status. Hematologic/Lymphatic:  Patient denies swollen glands, abnormal bleeding, and history blood transfusion. VITAL SIGNS:              Weight 145 lb / 65.77 kg      Height 63 in / 160.02 cm      Pulse 72 /min      Resp. Rate 18 /min      BMI 25.7 kg/m²      MULTI-SYSTEM PHYSICAL EXAMINATION:       Constitutional: Well-nourished. No physical deformities. Normally developed. Good grooming. Neck: Neck symmetrical, not swollen. Normal tracheal position. Respiratory: No labored breathing, no use of accessory muscles. Cardiovascular: Normal temperature, normal extremity pulses, no swelling, no varicosities. Lymphatic: No enlargement of neck, axillae, groin. Skin: No paleness, no jaundice, no cyanosis. No lesion, no ulcer, no rash. Neurologic / Psychiatric: Oriented to time, oriented to place, oriented to person. No depression, no anxiety, no agitation. Gastrointestinal: No mass, no tenderness, no rigidity, non obese abdomen. Eyes: Normal conjunctivae. Normal eyelids. Ears, Nose, Mouth, and Throat: Left ear no scars, no lesions, no masses. Right ear no scars, no lesions, no masses. Nose no scars, no lesions, no masses. Normal hearing. Normal lips. Musculoskeletal: Normal gait and station of head and neck. PAST DATA REVIEWED:     Source Of History:  Patient, Family/Caregiver   Lab Test Review:  Creatinine, Blood Urea Nitrogen (BUN)   Records Review:  Pathology Reports, Previous Patient Records   X-Ray Review: C.T. Abdomen/Pelvis: Reviewed Report.         PROCEDURES: None     ASSESSMENT:       ICD-10 Details   1 :  Malignant neoplasm of unspecified ureter - C66.9 Left, Stable   2  Urinary Tract Inf, Unspec site - N39.0 Improving     PLAN:    Orders   Labs Urine Cytology   Schedule   Return Visit/Planned Activity: Follow Up After Testing   Procedure: Unspecified Date at 3 - [facility]. [place] - [address] - Cysto Uretero Biopsy Fulgura - [address]7611, left, RETROGRADE PYELOGRAPHY, POSSIBLE LEFT JJ URETERAL STENT INSERTION, BLADDER BIOPSY AND FULGURATION   Patient Handouts   Provided Patient Education Sheets    Paper Handout Provided PAT   Document   Letter(s):  Created for General Fredy D.O. Created for Patient: Clinical Summary   Billing Summary:  Created   The patient and I talked at length about etiologies of urinary tract infection. We discussed bacterial and viral UTIs. We discussed the possible relationship between urinary tract infection and bladder outlet obstruction, neurogenic bladder, urethral stenosis, urethral stricture, meatal stenosis, urinary tract stones, congenital urinary tract abnormalities, acquired urinary tract abnormalities, hydronephrosis, ureteral obstruction, and the development of chronic cystitis. Alternative treatment options were discussed with the patient in detail. All questions were answered    Antibiotics, anti-inflammatories, and urinary analgesics were discussed with the patient. The patient gives informed consent to proceed with conservative treatment of their urinary tract infection with urinary tract imaging as indicated. The patient was given instructions to call for abdominal pain, pelvic pain, perirectal pain, nausea, vomiting, diarrhea, fever over 100? ?F, chills, hematuria, dysuria, frequency, urgency, or urge incontinence. Notes:  She may require additional postoperative intravesical BCG if she has recurrence of her tumor in the left distal ureter.

## 2020-09-01 LAB
ANAEROBIC CULTURE: NORMAL
BODY FLUID CULTURE, STERILE: ABNORMAL
GRAM STAIN RESULT: ABNORMAL
ORGANISM: ABNORMAL

## 2020-09-08 ENCOUNTER — TELEPHONE (OUTPATIENT)
Dept: PRIMARY CARE CLINIC | Age: 85
End: 2020-09-08

## 2020-09-08 RX ORDER — CLOTRIMAZOLE 10 MG/1
10 LOZENGE ORAL; TOPICAL
Qty: 50 TABLET | Refills: 0 | Status: SHIPPED | OUTPATIENT
Start: 2020-09-08 | End: 2020-09-18

## 2020-09-08 NOTE — TELEPHONE ENCOUNTER
The pt has been complaining that she has no taste and her daughter says her appetite isn't normal. The nurse is there and she is saying that the pt has thrush.  The pt's daughter is wondering if you would send something over to [facility] in [facility] for the thrush

## 2020-09-08 NOTE — TELEPHONE ENCOUNTER
Medication sent. Tell the daughter to have her dissolve in her mouth 5 times a day.   No better sick

## 2020-09-17 NOTE — PROGRESS NOTES
[facility] PRE-ADMISSION TESTING GENERAL INSTRUCTIONS- [facility]-phone number:[phone]    GENERAL INSTRUCTIONS  [x] Antibacterial Soap shower Night before and/or AM of Surgery  [] Hernán wipe instruction sheet and wipes given. [x] Nothing by mouth after midnight, including gum, candy, mints, or water. [x] You may brush your teeth, gargle, but do NOT swallow water. []Hibiclens shower  the night before and the morning of surgery. Do not use             Hibiclens on your face or head. [x]No smoking, chewing tobacco, illegal drugs, or alcohol within 24 hours of your surgery. [x] Jewelry, valuables or body piercing's should not be brought to the hospital. All body and/or tongue piercing's must be removed prior to arriving to hospital.  ALL hair pins must be removed. [x] Do not wear makeup, lotions, powders, deodorant. Nail polish as directed by the nurse. [x] Arrange transportation with a responsible adult  to and from the hospital. If you do not have a responsible adult  to transport you, you will need to make arrangements with a medical transportation company (i.e. [employer]. A Uber/taxi/bus is not appropriate unless you are accompanied by a responsible adult ). Arrange for someone to be with you for the remainder of the day and for 24 hours after your procedure due to having had anesthesia. Who will be your  for transportation? __DAUGHTER________________   Who will be staying with you for 24 hrs after your procedure?__________________  [x] Bring insurance cardDAUGHTER and photo ID.  [] Transfusion Bracelet: Please bring with you to hospital, day of surgery  [] Bring urine specimen day of surgery. Any small container is acceptable. [] Use inhalers the morning of surgery and bring with you to hospital.  [] Bring copy of living will or healthcare power of  papers to be placed in your electronic record.   [] CPAP/BI-PAP: Please bring your machine if you are to spend the night in the hospital.     PARKING INSTRUCTIONS:   [x] Arrival Time:__0930___________  · [] Parking lot '\"I\"  is located on [facility] (the corner of [facility] and [facility]). To enter, press the button and the gate will lift. A free token will be provided to exit the lot. One car per patient is allowed to park in this lot. All other cars are to park on [address] [place] [address] either in the parking garage or the handicap lot. [] To reach the [facility] lobby from [address] [place] [address], upon entering the hospital, take elevator B to the 3rd floor. EDUCATION INSTRUCTIONS:      [] Knee or hip replacement booklet & exercise pamphlets given. [] [place] [address] placed in chart. [] Pre-admission Testing educational folder given  [] Incentive Spirometry,coughing & deep breathing exercises reviewed. []Medication information sheet(s)   [x]Fluoroscopy-Xray used in surgery reviewed with patient. Educational pamphlet placed in chart. [x]Pain: Post-op pain is normal and to be expected. You will be asked to rate your pain from 0-10(a zero is not acceptable-education is needed). Your post-op pain goal is:4  [] Ask your nurse for your pain medication. [] Joint camp offered. [] Joint replacement booklets given. [] Other:___________________________    MEDICATION INSTRUCTIONS:   [x]Bring a complete list of your medications, please write the last time you took the medicine, give this list to the nurse. [x] Take the following medications the morning of surgery with 1-2 ounces of water: SEE MED LIST  [] Stop herbal supplements and vitamins 5 days before your surgery. [] DO NOT take any diabetic medicine the morning of surgery. Follow instructions for insulin the day before surgery. [] If you are diabetic and your blood sugar is low or you feel symptomatic, you may drink 1-2 ounces of apple juice or take a glucose tablet.   The morning of your procedure, you may call the pre-op area if you have concerns about your blood sugar [phone]. [] Use your inhalers the morning of surgery. Bring your emergency inhaler with you day of surgery. [] Follow physician instructions regarding any blood thinners you may be taking. WHAT TO EXPECT:  [x] The day of surgery you will be greeted and checked in by the [employer].  In addition, you will be registered in the [facility] by a Patient Access Representative. Please bring your photo ID and insurance card. A nurse will greet you in accordance to the time you are needed in the pre-op area to prepare you for surgery. Please do not be discouraged if you are not greeted in the order you arrive as there are many variables that are involved in patient preparation. Your patience is greatly appreciated as you wait for your nurse. Please bring in items such as: books, magazines, newspapers, electronics, or any other items  to occupy your time in the waiting area. [x]  Delays may occur with surgery and staff will make a sincere effort to keep you informed of delays. If any delays occur with your procedure, we apologize ahead of time for your inconvenience as we recognize the value of your time.

## 2020-09-22 ENCOUNTER — ANESTHESIA (OUTPATIENT)
Dept: OPERATING ROOM | Age: 85
End: 2020-09-22
Payer: MEDICARE

## 2020-09-22 ENCOUNTER — APPOINTMENT (OUTPATIENT)
Dept: GENERAL RADIOLOGY | Age: 85
End: 2020-09-22
Attending: UROLOGY
Payer: MEDICARE

## 2020-09-22 ENCOUNTER — HOSPITAL ENCOUNTER (OUTPATIENT)
Age: 85
Setting detail: OUTPATIENT SURGERY
Discharge: HOME | End: 2020-09-22
Attending: UROLOGY | Admitting: UROLOGY
Payer: MEDICARE

## 2020-09-22 ENCOUNTER — ANESTHESIA EVENT (OUTPATIENT)
Dept: OPERATING ROOM | Age: 85
End: 2020-09-22
Payer: MEDICARE

## 2020-09-22 VITALS
OXYGEN SATURATION: 95 % | TEMPERATURE: 97.5 F | RESPIRATION RATE: 16 BRPM | BODY MASS INDEX: 26.13 KG/M2 | HEART RATE: 61 BPM | WEIGHT: 142 LBS | SYSTOLIC BLOOD PRESSURE: 131 MMHG | HEIGHT: 62 IN | DIASTOLIC BLOOD PRESSURE: 60 MMHG

## 2020-09-22 VITALS — OXYGEN SATURATION: 100 % | DIASTOLIC BLOOD PRESSURE: 58 MMHG | SYSTOLIC BLOOD PRESSURE: 124 MMHG

## 2020-09-22 LAB — METER GLUCOSE: 95 MG/DL (ref 74–99)

## 2020-09-22 PROCEDURE — 74420 UROGRAPHY RTRGR +-KUB: CPT

## 2020-09-22 PROCEDURE — C1758 CATHETER, URETERAL: HCPCS | Performed by: UROLOGY

## 2020-09-22 PROCEDURE — 2580000003 HC RX 258: Performed by: UROLOGY

## 2020-09-22 PROCEDURE — 7100000010 HC PHASE II RECOVERY - FIRST 15 MIN: Performed by: UROLOGY

## 2020-09-22 PROCEDURE — 6360000002 HC RX W HCPCS: Performed by: UROLOGY

## 2020-09-22 PROCEDURE — 6360000004 HC RX CONTRAST MEDICATION: Performed by: UROLOGY

## 2020-09-22 PROCEDURE — C2617 STENT, NON-COR, TEM W/O DEL: HCPCS | Performed by: UROLOGY

## 2020-09-22 PROCEDURE — 3600000013 HC SURGERY LEVEL 3 ADDTL 15MIN: Performed by: UROLOGY

## 2020-09-22 PROCEDURE — 6360000002 HC RX W HCPCS: Performed by: NURSE ANESTHETIST, CERTIFIED REGISTERED

## 2020-09-22 PROCEDURE — 3700000000 HC ANESTHESIA ATTENDED CARE: Performed by: UROLOGY

## 2020-09-22 PROCEDURE — 2709999900 HC NON-CHARGEABLE SUPPLY: Performed by: UROLOGY

## 2020-09-22 PROCEDURE — 7100000011 HC PHASE II RECOVERY - ADDTL 15 MIN: Performed by: UROLOGY

## 2020-09-22 PROCEDURE — 88305 TISSUE EXAM BY PATHOLOGIST: CPT

## 2020-09-22 PROCEDURE — 82962 GLUCOSE BLOOD TEST: CPT

## 2020-09-22 PROCEDURE — 3700000001 HC ADD 15 MINUTES (ANESTHESIA): Performed by: UROLOGY

## 2020-09-22 PROCEDURE — 3600000003 HC SURGERY LEVEL 3 BASE: Performed by: UROLOGY

## 2020-09-22 DEVICE — URETERAL STENT
Type: IMPLANTABLE DEVICE | Site: URETER | Status: FUNCTIONAL
Brand: PERCUFLEX™

## 2020-09-22 RX ORDER — IBUPROFEN 600 MG/1
600 TABLET ORAL EVERY 8 HOURS PRN
Qty: 15 TABLET | Refills: 0 | Status: SHIPPED | OUTPATIENT
Start: 2020-09-22 | End: 2020-10-26

## 2020-09-22 RX ORDER — SODIUM CHLORIDE 9 MG/ML
INJECTION, SOLUTION INTRAVENOUS CONTINUOUS
Status: DISCONTINUED | OUTPATIENT
Start: 2020-09-22 | End: 2020-09-22 | Stop reason: HOSPADM

## 2020-09-22 RX ORDER — PHENAZOPYRIDINE HYDROCHLORIDE 100 MG/1
100 TABLET, FILM COATED ORAL 3 TIMES DAILY PRN
Status: DISCONTINUED | OUTPATIENT
Start: 2020-09-22 | End: 2020-09-22 | Stop reason: HOSPADM

## 2020-09-22 RX ORDER — PROPOFOL 10 MG/ML
INJECTION, EMULSION INTRAVENOUS PRN
Status: DISCONTINUED | OUTPATIENT
Start: 2020-09-22 | End: 2020-09-22 | Stop reason: SDUPTHER

## 2020-09-22 RX ORDER — PROPOFOL 10 MG/ML
INJECTION, EMULSION INTRAVENOUS CONTINUOUS PRN
Status: DISCONTINUED | OUTPATIENT
Start: 2020-09-22 | End: 2020-09-22 | Stop reason: SDUPTHER

## 2020-09-22 RX ORDER — PHENAZOPYRIDINE HYDROCHLORIDE 100 MG/1
100 TABLET, FILM COATED ORAL 3 TIMES DAILY PRN
Qty: 30 TABLET | Refills: 0 | Status: SHIPPED | OUTPATIENT
Start: 2020-09-22 | End: 2020-10-02

## 2020-09-22 RX ORDER — IBUPROFEN 400 MG/1
600 TABLET ORAL EVERY 8 HOURS PRN
Status: DISCONTINUED | OUTPATIENT
Start: 2020-09-22 | End: 2020-09-22 | Stop reason: HOSPADM

## 2020-09-22 RX ORDER — ATROPA BELLADONNA AND OPIUM 16.2; 6 MG/1; MG/1
60 SUPPOSITORY RECTAL EVERY 8 HOURS PRN
Status: DISCONTINUED | OUTPATIENT
Start: 2020-09-22 | End: 2020-09-22 | Stop reason: HOSPADM

## 2020-09-22 RX ORDER — ONDANSETRON 2 MG/ML
4 INJECTION INTRAMUSCULAR; INTRAVENOUS EVERY 6 HOURS PRN
Status: DISCONTINUED | OUTPATIENT
Start: 2020-09-22 | End: 2020-09-22 | Stop reason: HOSPADM

## 2020-09-22 RX ORDER — ONDANSETRON 2 MG/ML
INJECTION INTRAMUSCULAR; INTRAVENOUS PRN
Status: DISCONTINUED | OUTPATIENT
Start: 2020-09-22 | End: 2020-09-22 | Stop reason: SDUPTHER

## 2020-09-22 RX ORDER — CIPROFLOXACIN 2 MG/ML
200 INJECTION, SOLUTION INTRAVENOUS
Status: COMPLETED | OUTPATIENT
Start: 2020-09-22 | End: 2020-09-22

## 2020-09-22 RX ADMIN — SODIUM CHLORIDE: 9 INJECTION, SOLUTION INTRAVENOUS at 10:35

## 2020-09-22 RX ADMIN — PROPOFOL 20 MG: 10 INJECTION, EMULSION INTRAVENOUS at 11:04

## 2020-09-22 RX ADMIN — CIPROFLOXACIN 200 MG: 2 INJECTION, SOLUTION INTRAVENOUS at 11:09

## 2020-09-22 RX ADMIN — PROPOFOL 50 MCG/KG/MIN: 10 INJECTION, EMULSION INTRAVENOUS at 11:04

## 2020-09-22 RX ADMIN — ONDANSETRON HYDROCHLORIDE 4 MG: 2 INJECTION, SOLUTION INTRAMUSCULAR; INTRAVENOUS at 12:05

## 2020-09-22 ASSESSMENT — PULMONARY FUNCTION TESTS
PIF_VALUE: 1

## 2020-09-22 ASSESSMENT — PAIN SCALES - GENERAL
PAINLEVEL_OUTOF10: 0

## 2020-09-22 ASSESSMENT — LIFESTYLE VARIABLES: SMOKING_STATUS: 0

## 2020-09-22 ASSESSMENT — PAIN - FUNCTIONAL ASSESSMENT: PAIN_FUNCTIONAL_ASSESSMENT: 0-10

## 2020-09-22 NOTE — OP NOTE
[facility] Urology Britni Suazo.  Urology Post-operative Note    Anish Vallecillo  YOB: 1928  [id number]    Pre-operative Diagnosis: Left hydronephrosis, left distal ureteral tumor, recurrent bladder cancer    Post-operative Diagnosis:  Same    Procedure: Cystourethroscopy, bilateral retrograde pyelography, left JJ ureteral stent insertion, transurethral bladder tumor resection (3 cm²), fulguration    Surgeon: Chaitanya Wu MD    Anesthesia: [facility]    Estimated Blood Loss: Minimal     Complications: None    Drains: Left 7 Yemeni by 26 cm JJ ureteral stent    Specimen(s): Bladder tumor    Clinical Note: 80-year-old female with known urothelial carcinoma the left distal ureter. She has recurrence extensively in the left distal ureter with seeding of tumor in the bladder. She presents for the above listed procedure. The risks and benefits of the procedure including but not limited to infection, bleeding, bladder perforation injury, possible need for stent with stent irritation etc. were discussed in detail and she elected to proceed    Operative Description: She was taken to the operating room and placed in the OR table in the supine position. She received IV Cipro preoperatively. Following induction of anesthesia she was repositioned into the dorsolithotomy position. Her external genitalia and abdomen were then prepped and draped sterilely. A  film KUB showed no stones or abnormalities overlying the urinary tract. A 21 Yemeni cystoscope with 30 degree lens was inserted per urethra and into the bladder. There were no urethral strictures false passages or tumors. Panendoscopic evaluation of the bladder showed no clots stones or foreign bodies. The right ureteral orifice was in its usual location and was normal.  The left side was very dilated and there was evidence of necrotic tumor emanating from the ureteral orifice. The bladder showed diffuse cystitis.   There were postsurgical necrotic changes of tumor on the posterior wall. There was still residual tumor on the anterior wall and near the left lateral bladder neck. This measured approximately 3 cm². A 5 Yemeni cone-tip catheter was inserted through the scope and into the right ureteral orifice. 10 cc of contrast was instilled and a right retrograde pyelogram was performed using intraoperative fluoroscopy. The right ureter was normal in size and caliber throughout its course without stones filling defect strictures or abnormalities. The right pelvic calyceal system showed no hydronephrosis filling defects or abnormalities. The catheter was removed and the system drained freely. A sensor wire was inserted through the scope and into the left ureteral orifice alongside the tumor and up into the left renal pelvis using fluoroscopic imaging. A 7 Western Sveta by 26 cm JJ ureteral stent was passed over the wire and into the left renal pelvis. The wire was removed. Fluoroscopy confirmed coiling the stent proximally in the left renal pelvis and it was visualized to coil distally in the bladder in good position. The ureteral catheter was then inserted into the left ureteral orifice alongside the stent. Contrast was instilled. There was a ratty appearing look to the distal left ureter measuring approximately 6 cm. Proximal to this was moderate hydronephrosis. There did not appear to be any filling defects in the proximal to mid ureter. The tumor appears to be isolated to the left 6 cm distally. I did not pursue ureteroscopy at this time. The cystoscope was removed. A switch to warm saline irrigation. A 27 Yemeni continuous-flow resectoscope sheath with Karyn obturator was passed per urethra and into the bladder. The bladder tumor was resected superficially without bladder perforation using the plasma loop. A 12 degree resectoscope was utilized.   The specimens were irrigated from the bladder with the piston syringe and sent to pathology for analysis. The bladder was again inspected and was intact. The tumor bed and surrounding urothelium were cauterized until there is meticulous hemostasis and no active bleeding. There was again no perforation. There is no macroscopic tumor remaining at this time. The bladder was emptied and the resectoscope was removed. I did not place a Webb. She tolerated the procedure well and was taken to the PACU in stable condition. She was discharged home with prescriptions for ibuprofen and Pyridium. She will undergo left ureteroscopy with tumor ablation with the holmium laser in approximately 1 month. This will be scheduled to be performed at the same time as her gallbladder drain which is to be exchanged in roughly 1 month's time which we discussed in detail with her family postoperatively.       Dominica Cowden, MD  9/22/2020  12:59 PM

## 2020-09-22 NOTE — H&P
URINE CYTOLOGY ON 4/17/18 WAS NEGATIVE   4/4/17: URINE CYTOLOGY WAS NEGATIVE   URINE CYTOLOGY ON 10/25/16 WAS NEGATIVE   URINE CULTURE ON 4/4/17 GREW ENTEROCOCCUS AND PSEUDOMONAS AND WAS TREATED WITH ANTIBIOTICS WHICH SHE FINISHED   2/22/17: COMPLETED 6 WEEKLY BCG TREATMENTS   + NOCTURIA X1   - HEMATURIA   - PAIN   LASIX RENAL SCAN 11/2/16 WAS NORMAL WITH 49% LEFT AND 51% LEFT RENAL FUNCTION   PATHOLOGY FROM LEFT URETERAL TUMOR BIOPSY ON 10/25/16 WAS POSITIVE FOR LOW GRADE PAPILLARY UROTHELIAL CARCINOMA, NON INVASIVE   CT SCAN ON 9/19/16 SHOWED LEFT HYDRONEPHROSIS AND AN ENHANCING LEFT DISTAL URETERAL TUMOR (NO METS)   BUN AND CREATININE ON 10/30/19 WERE 14 AND 0.8, RESPECTIVELY   FOUND TO HAVE EXTENSIVE BLADDER AND LEFT URETERAL TUMOR RECURRENCE RECENTLY  HAD A GALLBLADDER DRAIN PLACED FOR ACUTE CHOLECYSTITIS        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 The problem is on the left side. Her cancer was diagnosed 4 years ago. She has had laser ablation to treat her transitional cell carcinoma. She has not received radiation therapy. She did not receive chemotherapy for her cancer. She did not see blood in her urine. She is not having pain in new locations. She does have a good appetite. Her bowels are moving normally. She has not recently had unwanted weight loss. Her last cysto was 2 years ago.  Her last radiologic test to evaluate the kidneys was 4 years ago.         ALLERGIES: Enalapril Maleate  Sulfa        MEDICATIONS: Oncovite tablet 2 tablet PO BID   Amlodipine Besilate   Losartan Potassium   Lumigan   Rhopressa   Simvastatin   Timolol Maleate   Vitamin D3          PSH: Bladder Instill AntiCA Agent - 2017, 2017, 2017, 2017, 2017, 2017  Cysto Uretero Biopsy Fulgura, Left, W/ LEFT JJ URETERAL STENT INSERTION - 2016  Cysto Uretero W/excise Tumor, Left, W/URETERAL STENT EXCHANGE - 2016  Cystoscopy Insert Stent - 10/19/2017  Cystoscopy Ureteroscopy, Left - 2018 - at 4 - [address] [facility] 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 Does not use smokeless tobacco.  Has never drank. Does not use drugs. Drinks 1 caffeinated drink per day. Has not had a blood transfusion. Patient's occupation is/was RETIRED.         Notes: FOUR CHILDREN     REVIEW OF SYSTEMS:       Constitutional:  Patient denies fever, chills, and weight loss. Eyes:  Patient denies wearing glasses. Ears, Nose, Mouth, Throat:  Patient denies hearing loss. Cardiovascular:  Patient denies chest pains, swollen ankles, and irregular heartbeat. Respiratory:  Patient denies shortness of breath, wheezing, and chronic cough. Gastrointestinal:  Patient denies abdominal pain, nausea/vomiting, and change in bowels. Genitourinary:  Patient denies incontinence, painful urination, blood in urine, gerard catheter, and suprapubic catheter. Musculoskeletal:  Patient denies using wheelchair, using walker, and using cane. Integumentary/Skin:  Patient denies rash, persistent itching, and skin cancer history. Neurological:  Patient denies numbness, tingling, dizziness, and altered mental status. Hematologic/Lymphatic:  Patient denies swollen glands, abnormal bleeding, and history blood transfusion. VITAL SIGNS:                   Weight 145 lb / 65.77 kg      Height 63 in / 160.02 cm      Pulse 72 /min      Resp. Rate 18 /min      BMI 25.7 kg/m²      MULTI-SYSTEM PHYSICAL EXAMINATION:        Constitutional: Well-nourished. No physical deformities. Normally developed. Good grooming. Neck: Neck symmetrical, not swollen. Normal tracheal position. Respiratory: No labored breathing, no use of accessory muscles. Cardiovascular: Normal temperature, normal extremity pulses, no swelling, no varicosities. Lymphatic: No enlargement of neck, axillae, groin. Skin: No paleness, no jaundice, no cyanosis. No lesion, no ulcer, no rash. Neurologic / Psychiatric: Oriented to time, oriented to place, oriented to person. No depression, no anxiety, no agitation. Gastrointestinal: No mass, no tenderness, no rigidity, non obese abdomen. There is a right upper quadrant gallbladder drain in place      Eyes: Normal conjunctivae. Normal eyelids. Ears, Nose, Mouth, and Throat: Left ear no scars, no lesions, no masses. Right ear no scars, no lesions, no masses. Nose no scars, no lesions, no masses. Normal hearing. Normal lips.       Musculoskeletal: Normal gait and station of head and neck.                  PAST DATA REVIEWED:      Source Of History:  Patient, Family/Caregiver   Lab Test Review:  Creatinine, Blood Urea Nitrogen (BUN)   Records Review:  Pathology Reports, Previous Patient Records   X-Ray of her tumor in the left distal ureter.

## 2020-09-22 NOTE — ANESTHESIA PRE PROCEDURE
Intravenous Continuous Patel Sheikh MD        ciprofloxacin (CIPRO) IVPB 200 mg  200 mg Intravenous On Call to [address], [place]           Allergies:     Allergies   Allergen Reactions    Enalapril Maleate Other (See Comments)     Other reaction(s): cough/rash    Penicillins     Sulfa Antibiotics        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    Kidney disease, chronic, stage III (GFR 30-59 ml/min) (HCC) N18.3    Gross hematuria R31.0    Bladder tumor D49.4    Ureteral tumor D49.59    Abdominal pain R10.9    UTI (urinary tract infection) N39.0    Hydronephrosis N13.30    Hydroureter N13.4    Lesion of lumbar spine M89.9    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 Maritza Mcdaniel  07/18/2017    stent placement    CYSTOSCOPY  10/19/2017    Left stent placement    CYSTOSCOPY INSERTION / REMOVAL STENT / STONE Left 8/20/2020    CYSTOSCOPY, TRANSURETHRAL RESECTION BLADDER TUMOR performed by Juan J Dupont MD at [place] [address]

## 2020-09-24 PROBLEM — N39.0 UTI (URINARY TRACT INFECTION): Status: RESOLVED | Noted: 2020-08-25 | Resolved: 2020-09-24

## 2020-09-24 NOTE — ANESTHESIA POSTPROCEDURE EVALUATION
Department of Anesthesiology  Postprocedure Note    Patient: Anisa Paredes  MRN: [id number]  YOB: 1928  Date of evaluation: 9/24/2020  Time:  4:25 PM     Procedure Summary     Date:  09/22/20 Room / Location:  [facility] OR 11 / [facility]    Anesthesia Start:  2004 Anesthesia Stop:  3102    Procedure:  CYSTO URETHROSCOPY, RETROGRADE PYELOGRAM, BLADDER TUMOR RESECTION AND FULGURATION, POSSIBLE LEFT URETEROSCOPY AND LASER TUMOR ABLATION,  POSSIBLE LEFT STENT INSERTION (N/A ) Diagnosis:  (URETERAL TUMOR)    Surgeon:  Eladia Han MD Responsible Provider:  Bhavana Zaldivar MD    Anesthesia Type:  MAC, spinal ASA Status:  3          Anesthesia Type: MAC, spinal    Johnna Phase I: Johnna Score: 10    Johnna Phase II: Johnna Score: 10    Last vitals: Reviewed and per EMR flowsheets.        Anesthesia Post Evaluation    Patient location during evaluation: PACU  Patient participation: complete - patient participated  Level of consciousness: awake and alert  Airway patency: patent  Nausea & Vomiting: no vomiting and no nausea  Complications: no  Cardiovascular status: blood pressure returned to baseline  Respiratory status: acceptable  Hydration status: euvolemic

## 2020-09-30 ENCOUNTER — TELEPHONE (OUTPATIENT)
Dept: PRIMARY CARE CLINIC | Age: 85
End: 2020-09-30

## 2020-10-12 ENCOUNTER — OFFICE VISIT (OUTPATIENT)
Dept: PRIMARY CARE CLINIC | Age: 85
End: 2020-10-12
Payer: MEDICARE

## 2020-10-12 ENCOUNTER — HOSPITAL ENCOUNTER (OUTPATIENT)
Age: 85
Discharge: HOME | End: 2020-10-14
Payer: MEDICARE

## 2020-10-12 VITALS
BODY MASS INDEX: 25.24 KG/M2 | SYSTOLIC BLOOD PRESSURE: 126 MMHG | WEIGHT: 138 LBS | DIASTOLIC BLOOD PRESSURE: 78 MMHG | TEMPERATURE: 97.8 F

## 2020-10-12 LAB
BACTERIA: ABNORMAL /HPF
BASOPHILS ABSOLUTE: 0.08 E9/L (ref 0–0.2)
BASOPHILS RELATIVE PERCENT: 1.3 % (ref 0–2)
BILIRUBIN URINE: NEGATIVE
BLOOD, URINE: ABNORMAL
CLARITY: ABNORMAL
COLOR: YELLOW
EOSINOPHILS ABSOLUTE: 0.34 E9/L (ref 0.05–0.5)
EOSINOPHILS RELATIVE PERCENT: 5.4 % (ref 0–6)
GLUCOSE URINE: NEGATIVE MG/DL
HCT VFR BLD CALC: 38 % (ref 34–48)
HEMOGLOBIN: 11.5 G/DL (ref 11.5–15.5)
IMMATURE GRANULOCYTES #: 0.02 E9/L
IMMATURE GRANULOCYTES %: 0.3 % (ref 0–5)
KETONES, URINE: NEGATIVE MG/DL
LEUKOCYTE ESTERASE, URINE: ABNORMAL
LYMPHOCYTES ABSOLUTE: 1.4 E9/L (ref 1.5–4)
LYMPHOCYTES RELATIVE PERCENT: 22.3 % (ref 20–42)
MCH RBC QN AUTO: 29.5 PG (ref 26–35)
MCHC RBC AUTO-ENTMCNC: 30.3 % (ref 32–34.5)
MCV RBC AUTO: 97.4 FL (ref 80–99.9)
MONOCYTES ABSOLUTE: 0.51 E9/L (ref 0.1–0.95)
MONOCYTES RELATIVE PERCENT: 8.1 % (ref 2–12)
NEUTROPHILS ABSOLUTE: 3.92 E9/L (ref 1.8–7.3)
NEUTROPHILS RELATIVE PERCENT: 62.6 % (ref 43–80)
NITRITE, URINE: NEGATIVE
PDW BLD-RTO: 14.5 FL (ref 11.5–15)
PH UA: 6 (ref 5–9)
PLATELET # BLD: 274 E9/L (ref 130–450)
PMV BLD AUTO: 10.3 FL (ref 7–12)
PROTEIN UA: 100 MG/DL
RBC # BLD: 3.9 E12/L (ref 3.5–5.5)
RBC UA: >20 /HPF (ref 0–2)
SPECIFIC GRAVITY UA: 1.01 (ref 1–1.03)
UROBILINOGEN, URINE: 0.2 E.U./DL
WBC # BLD: 6.3 E9/L (ref 4.5–11.5)
WBC UA: ABNORMAL /HPF (ref 0–5)

## 2020-10-12 PROCEDURE — 85025 COMPLETE CBC W/AUTO DIFF WBC: CPT

## 2020-10-12 PROCEDURE — 1123F ACP DISCUSS/DSCN MKR DOCD: CPT | Performed by: FAMILY MEDICINE

## 2020-10-12 PROCEDURE — G0438 PPPS, INITIAL VISIT: HCPCS | Performed by: FAMILY MEDICINE

## 2020-10-12 PROCEDURE — 4040F PNEUMOC VAC/ADMIN/RCVD: CPT | Performed by: FAMILY MEDICINE

## 2020-10-12 PROCEDURE — G8484 FLU IMMUNIZE NO ADMIN: HCPCS | Performed by: FAMILY MEDICINE

## 2020-10-12 PROCEDURE — 36415 COLL VENOUS BLD VENIPUNCTURE: CPT

## 2020-10-12 PROCEDURE — 80053 COMPREHEN METABOLIC PANEL: CPT

## 2020-10-12 PROCEDURE — 81001 URINALYSIS AUTO W/SCOPE: CPT

## 2020-10-12 ASSESSMENT — LIFESTYLE VARIABLES
AUDIT TOTAL SCORE: INCOMPLETE
HOW OFTEN DO YOU HAVE A DRINK CONTAINING ALCOHOL: NEVER
AUDIT-C TOTAL SCORE: INCOMPLETE
HOW OFTEN DO YOU HAVE A DRINK CONTAINING ALCOHOL: 0

## 2020-10-12 ASSESSMENT — PATIENT HEALTH QUESTIONNAIRE - PHQ9
1. LITTLE INTEREST OR PLEASURE IN DOING THINGS: 0
SUM OF ALL RESPONSES TO PHQ9 QUESTIONS 1 & 2: 0
SUM OF ALL RESPONSES TO PHQ QUESTIONS 1-9: 0
SUM OF ALL RESPONSES TO PHQ QUESTIONS 1-9: 0
2. FEELING DOWN, DEPRESSED OR HOPELESS: 0

## 2020-10-12 NOTE — PROGRESS NOTES
Medicare Annual Wellness Visit  Name: Jaye Mcmahan Date: 10/12/2020   MRN: [id number] Sex: Female   Age: 80 y.o. Ethnicity: Non-/Non    : 3/20/1928 Race: White      Pam Conde is here for Medicare AWV and Other (2m)  2 mo ck   Re   Bladder ca nad    anx  No pain over th e GB      She refuses to have any immunizations  A t all   imani in room  Has stent inadn need   Chg  imani  woner if needs to  hvee  b g  stnent  In or have  gb out    imani river call madeleine and see if can be done  Screenings for behavioral, psychosocial and functional/safety risks, and cognitive dysfunction are all negative except as indicated below. These results, as well as other patient data from the [address] form, are documented in Flowsheets linked to this Encounter. Allergies   Allergen Reactions    Enalapril Maleate Other (See Comments)     Other reaction(s): cough/rash    Penicillins     Sulfa Antibiotics          Prior to Visit Medications    Medication Sig Taking? Authorizing Provider   ibuprofen (ADVIL;MOTRIN) 600 MG tablet Take 1 tablet by mouth every 8 hours as needed for Pain  Nori Goddard MD   citalopram (CELEXA) 10 MG tablet Take 1 tablet by mouth every morning  Ganga Lujan DO   ammonium lactate (LAC-HYDRIN) 12 % lotion Apply topically daily.   Ganga Lujan DO   Multiple Vitamins-Minerals (ONCOVITE PO) Take by mouth daily  Historical Provider, MD   Cholecalciferol (VITAMIN D3) 50 MCG (2000) CAPS Take 1 capsule by mouth daily  Ganga Lujan DO   amLODIPine (NORVASC) 2.5 MG tablet Take 1 tablet by mouth every morning  Ganga Lujan DO   bimatoprost (LUMIGAN) 0.01 % SOLN ophthalmic drops Place 1 drop into both eyes nightly  Historical Provider, MD   dorzolamide-timolol (COSOPT) 22.3-6.8 MG/ML ophthalmic solution Place 1 drop into both eyes 2 times daily  Historical Provider, MD   RHOPRESSA 0.02 % SOLN Apply 1 drop to eye daily Indications: administer one drop in both eyes every morning  Historical Provider, MD   simvastatin (ZOCOR) 20 MG tablet Take 1 tablet by mouth nightly  Ganga Lujan DO   losartan (COZAAR) 100 MG tablet Take 1 tablet by mouth daily  Joaquin Lujan DO         Past Medical History:   Diagnosis Date    Abdominal pain 1/29/2015    Acute cholecystitis 10/29/2019    Diet-controlled diabetes mellitus ([address] 8/20/2019    Gastritis 1/30/2015    Glaucoma     Hematemesis 1/29/2015    Hyperlipidemia     Hypertension        Past Surgical History:   Procedure Laterality Date    CT [facility] NEW ACCESS  8/27/2020    [facility] NEW ACCESS 8/27/2020 Sadi Ramírez MD [facility]    CYSTOSCOPY  04/04/2017    cysto retro left ureteroscopy, stent removal with Dr. Parviz Thacker  07/18/2017    stent placement    CYSTOSCOPY  10/19/2017    Left stent placement    CYSTOSCOPY N/A 9/22/2020    CYSTO URETHROSCOPY, RETROGRADE PYELOGRAM, BLADDER TUMOR RESECTION AND FULGURATION, POSSIBLE LEFT URETEROSCOPY AND LASER TUMOR ABLATION,  POSSIBLE LEFT STENT INSERTION performed by Kev Mayer MD at [address] / REMOVAL STENT / STONE Left 8/20/2020    CYSTOSCOPY, TRANSURETHRAL RESECTION BLADDER TUMOR performed by Kev Mayer MD at [address] [facility] Left 4/17/2018    CYSTOSCOPY RETROGRADE PYELOGRAM BLADDER AND LEFT URETERAL WASHINGS INSERTION LEFT URETERAL STENT LEFT URETEROSCOPY performed by Kev Mayer MD at [address] [facility]  1/29/15         Family History   Problem Relation Age of Onset    Early Death Mother     Prostate Cancer Father     Kidney Cancer Sister     Cancer Brother     Alzheimer's Disease Brother     Cancer Sister        CareTeam (Including outside providers/suppliers regularly involved in providing care):   Patient Care Team:  Yuliana Ferraro DO as PCP - [address] Holcomb DO as PCP - [facility] Provider    Wt Readings from Last 3 Encounters:   10/12/20 138 lb (62.6 kg)   09/22/20 142 lb (64.4 kg)   08/25/20 142 lb 7 oz (64.6 kg)     Vitals:    10/12/20 1248   BP: 126/78   Temp: 97.8 °F (36.6 °C)   Weight: 138 lb (62.6 kg)     Body mass index is 25.24 kg/m². Based upon direct observation of the patient, evaluation of cognition reveals recent and remote memory intact. General Appearance: alert and oriented to person, place and time, well developed and well- nourished, in no acute distress  Skin: warm and dry, no rash or erythema  Head: normocephalic and atraumatic  Eyes: pupils equal, round, and reactive to light, extraocular eye movements intact, conjunctivae normal  ENT: tympanic membrane, external ear and ear canal normal bilaterally, nose without deformity, nasal mucosa and turbinates normal without polyps  Neck: supple and non-tender without mass, no thyromegaly or thyroid nodules, no cervical lymphadenopathy  Pulmonary/Chest: clear to auscultation bilaterally- no wheezes, rales or rhonchi, normal air movement, no respiratory distress  Cardiovascular: normal rate, regular rhythm, normal S1 and S2, no murmurs, rubs, clicks, or gallops, distal pulses intact, no carotid bruits  Abdomen: soft, non-tender, non-distended, normal bowel sounds, no masses or organomegaly  Extremities: no cyanosis, clubbing or edema  Musculoskeletal: normal range of motion, no joint swelling, deformity or tenderness  Neurologic: reflexes normal and symmetric, no cranial nerve deficit, gait, coordination and speech normal    Patient's complete Health Risk Assessment and screening values have been reviewed and are found in Flowsheets. The following problems were reviewed today and where indicated follow up appointments were made and/or referrals ordered.     Positive Risk Factor Screenings with Interventions:     Fall Risk:  2 or more falls in past year?: (!) yes  Fall with injury in past year?: no  Fall Risk Interventions: Aged Out     Recommendations for Preventive Services Due: see orders and patient instructions/AVS.  . Recommended screening schedule for the next 5-10 years is provided to the patient in written form: see Patient Naomi Maria was seen today for medicare awv and other. Diagnoses and all orders for this visit:    Routine general medical examination at a [facility] facility    Biliary calculus of other site without obstruction    Ureteral carcinoma, left ([facility] [address]          imani will call dr Anabelle Staley  A great deal of time spent reviewing medications, diet, exercise, social issues. Also reviewing the chart before entering the room with patient and finishing charting after leaving patient's room. More than half of that time was spent face to face with the patient in counseling and coordinating care.     s x calll  imani purvi craven if  Wants second opinion

## 2020-10-12 NOTE — PATIENT INSTRUCTIONS
Personalized Preventive Plan for Anish Vallecillo - 10/12/2020  Medicare offers a range of preventive health benefits. Some of the tests and screenings are paid in full while other may be subject to a deductible, co-insurance, and/or copay. Some of these benefits include a comprehensive review of your medical history including lifestyle, illnesses that may run in your family, and various assessments and screenings as appropriate. After reviewing your medical record and screening and assessments performed today your provider may have ordered immunizations, labs, imaging, and/or referrals for you. A list of these orders (if applicable) as well as your Preventive Care list are included within your After Visit Summary for your review. Other Preventive Recommendations:    · A preventive eye exam performed by an eye specialist is recommended every 1-2 years to screen for glaucoma; cataracts, macular degeneration, and other eye disorders. · A preventive dental visit is recommended every 6 months. · Try to get at least 150 minutes of exercise per week or 10,000 steps per day on a pedometer . · Order or download the FREE \"Exercise & Physical Activity: Your Everyday Guide\" from [employer]. Call 3-756.439.7458 or search [employer] online. · You need 0944-9572 mg of calcium and 7807-7862 IU of vitamin D per day. It is possible to meet your calcium requirement with diet alone, but a vitamin D supplement is usually necessary to meet this goal.  · When exposed to the sun, use a sunscreen that protects against both UVA and UVB radiation with an SPF of 30 or greater. Reapply every 2 to 3 hours or after sweating, drying off with a towel, or swimming. · Always wear a seat belt when traveling in a car. Always wear a helmet when riding a bicycle or motorcycle.

## 2020-10-13 ENCOUNTER — TELEPHONE (OUTPATIENT)
Dept: PRIMARY CARE CLINIC | Age: 85
End: 2020-10-13

## 2020-10-13 LAB
ALBUMIN SERPL-MCNC: 4.1 G/DL (ref 3.5–5.2)
ALP BLD-CCNC: 116 U/L (ref 35–104)
ALT SERPL-CCNC: 15 U/L (ref 0–32)
ANION GAP SERPL CALCULATED.3IONS-SCNC: 17 MMOL/L (ref 7–16)
AST SERPL-CCNC: 19 U/L (ref 0–31)
BILIRUB SERPL-MCNC: 0.3 MG/DL (ref 0–1.2)
BUN BLDV-MCNC: 27 MG/DL (ref 8–23)
CALCIUM SERPL-MCNC: 9.7 MG/DL (ref 8.6–10.2)
CHLORIDE BLD-SCNC: 103 MMOL/L (ref 98–107)
CO2: 21 MMOL/L (ref 22–29)
CREAT SERPL-MCNC: 1.4 MG/DL (ref 0.5–1)
GFR AFRICAN AMERICAN: 43
GFR NON-AFRICAN AMERICAN: 35 ML/MIN/1.73
GLUCOSE BLD-MCNC: 124 MG/DL (ref 74–99)
POTASSIUM SERPL-SCNC: 4.9 MMOL/L (ref 3.5–5)
SODIUM BLD-SCNC: 141 MMOL/L (ref 132–146)
TOTAL PROTEIN: 7.3 G/DL (ref 6.4–8.3)

## 2020-10-13 NOTE — TELEPHONE ENCOUNTER
Notify daughter her lab looks okay but she looks a little on the dry side. If she still taking ibuprofen from the urologist she must stop that. Only use Tylenol for pain. Increase fluids.   Let us know how it goes with the surgical visit

## 2020-10-13 NOTE — TELEPHONE ENCOUNTER
Daughter notified. Said she spoke to surgeon's office yesterday and they told her patient refused surgery. She's going to wait until the end of the month when she gets rechecked. Then if chooses to have surgery she will call here for a referral for 2nd opinion. Not sure why surgeon took patient's word of refusal when she has medical directive.

## 2020-10-13 NOTE — TELEPHONE ENCOUNTER
received call from Damon Shukla at [address]  The CMN form that was sent over for pt is for the regular Boost.  Pt's daughter states that it is supposed to be Boost plus. Do you want to make the change for her? The form will need plus added to it and the calorie intake will need to be changed to 1,080.     Form will need to be re-faxed to [phone], if no change call Damon Shukla [phone]

## 2020-10-16 RX ORDER — LOSARTAN POTASSIUM 100 MG/1
100 TABLET ORAL DAILY
Qty: 90 TABLET | Refills: 3 | Status: SHIPPED
Start: 2020-10-16 | End: 2020-10-27 | Stop reason: SDUPTHER

## 2020-10-19 NOTE — TELEPHONE ENCOUNTER
dominic calling again. They need the form that we sent over to say Boost Plus.  She said you can just cross the Reg Boost out and put Boost plus

## 2020-10-20 ENCOUNTER — PREP FOR PROCEDURE (OUTPATIENT)
Dept: UROLOGY | Age: 85
End: 2020-10-20

## 2020-10-20 RX ORDER — SODIUM CHLORIDE 9 MG/ML
INJECTION, SOLUTION INTRAVENOUS CONTINUOUS
Status: CANCELLED | OUTPATIENT
Start: 2020-10-20

## 2020-10-20 RX ORDER — CIPROFLOXACIN 2 MG/ML
400 INJECTION, SOLUTION INTRAVENOUS
Status: CANCELLED | OUTPATIENT
Start: 2020-10-20 | End: 2020-10-20

## 2020-10-26 RX ORDER — SIMVASTATIN 20 MG
20 TABLET ORAL NIGHTLY
Qty: 90 TABLET | Refills: 3 | Status: SHIPPED
Start: 2020-10-26 | End: 2020-11-18 | Stop reason: SDUPTHER

## 2020-10-27 RX ORDER — AMLODIPINE BESYLATE 2.5 MG/1
2.5 TABLET ORAL EVERY MORNING
Qty: 90 TABLET | Refills: 3 | Status: SHIPPED
Start: 2020-10-27 | End: 2020-11-18 | Stop reason: SDUPTHER

## 2020-10-27 RX ORDER — LOSARTAN POTASSIUM 100 MG/1
100 TABLET ORAL DAILY
Qty: 90 TABLET | Refills: 3 | Status: SHIPPED
Start: 2020-10-27 | End: 2020-11-18 | Stop reason: SDUPTHER

## 2020-11-18 RX ORDER — LOSARTAN POTASSIUM 100 MG/1
100 TABLET ORAL DAILY
Qty: 90 TABLET | Refills: 3 | Status: SHIPPED
Start: 2020-11-18 | End: 2021-01-01 | Stop reason: SDUPTHER

## 2020-11-18 RX ORDER — AMLODIPINE BESYLATE 2.5 MG/1
2.5 TABLET ORAL EVERY MORNING
Qty: 90 TABLET | Refills: 3 | Status: SHIPPED
Start: 2020-11-18 | End: 2021-01-01 | Stop reason: SDUPTHER

## 2020-11-18 RX ORDER — SIMVASTATIN 20 MG
20 TABLET ORAL NIGHTLY
Qty: 90 TABLET | Refills: 3 | Status: SHIPPED
Start: 2020-11-18 | End: 2021-01-01 | Stop reason: SDUPTHER

## 2020-12-03 DIAGNOSIS — Z01.818 PRE-OP EXAM: Primary | ICD-10-CM

## 2020-12-07 ENCOUNTER — HOSPITAL ENCOUNTER (OUTPATIENT)
Age: 85
Discharge: HOME | End: 2020-12-09
Payer: MEDICARE

## 2020-12-07 PROCEDURE — U0003 INFECTIOUS AGENT DETECTION BY NUCLEIC ACID (DNA OR RNA); SEVERE ACUTE RESPIRATORY SYNDROME CORONAVIRUS 2 (SARS-COV-2) (CORONAVIRUS DISEASE [COVID-19]), AMPLIFIED PROBE TECHNIQUE, MAKING USE OF HIGH THROUGHPUT TECHNOLOGIES AS DESCRIBED BY CMS-2020-01-R: HCPCS

## 2020-12-08 LAB
SARS-COV-2: NOT DETECTED
SOURCE: NORMAL

## 2020-12-14 ENCOUNTER — HOSPITAL ENCOUNTER (OUTPATIENT)
Dept: INTERVENTIONAL RADIOLOGY/VASCULAR | Age: 85
Discharge: HOME | End: 2020-12-16
Payer: MEDICARE

## 2020-12-14 PROCEDURE — 47531 INJECTION FOR CHOLANGIOGRAM: CPT | Performed by: RADIOLOGY

## 2020-12-14 PROCEDURE — 47531 INJECTION FOR CHOLANGIOGRAM: CPT

## 2020-12-14 PROCEDURE — 2709999900 IR INJ PERC CHOLA EXIST ACCESS W IMAGING

## 2020-12-14 PROCEDURE — 6360000004 HC RX CONTRAST MEDICATION: Performed by: RADIOLOGY

## 2020-12-14 RX ADMIN — IOPAMIDOL 30 ML: 612 INJECTION, SOLUTION INTRAVENOUS at 12:19

## 2020-12-14 NOTE — PROGRESS NOTES
Patient was identified by 2 identifiers and taken to CT room for scheduled cholecystostomy drain check with fluoroscopy as ordered by Dr. Ney Head. Patient has a 10 [place] cholecystostomy drain placed 8/27/20 by Dr. REY[place]O [place] for acute cholecystitis. Patient states her drain has been draining well, approximately 150-200 mL daily, flushing well with 10 mL of normal saline three times a day. Upon arrival, the patient's drain is sutured into the skin at the right breast crease and is not dressed. Patient states it is sore, but it is directly where she places pressure from her bra band and you can see a red pressure kusum all around her chest under her bra-line from the pressure. The site does not appear infected. The procedure was reviewed, all questions were answered, and the patient verbalized understanding. Past medical history, medications, and allergies were reviewed. Patient denies fever, chills, redness, or increase in drainage or any other signs of discomfort from the drain site. Patient is instructed to pad this site if choosing to wear a bra or to go without wearing a bra to ease comfort at the drain insertion site. Patient is instructed to keep dressing on the site and to make sure the site stays clean, dry, and intact. Patient states she has no plans at this time to have her gallbladder removed or to follow up with Dr. Ney Head. She states she follows with Dr. Fabio Vieira. 1147 - Time out was performed with patient, myself, and Sandro, RT     1148 - 1 mL of Isovue 300 was injected through the drain and the patient was imaged. Dr. Piedad Wilson checked images and states discharge instructions are to have the patient flush tube with 10 mL of normal saline daily and to return Monday, 12/21/20 for IR drain exchange. Dr. Nii Santos office was notified. Patient states she should follow up with Dr. Nii Santos office after her drain exchange.   Daughter and patient were educated, questions answered, pre-appointment information given, and understanding expressed.

## 2020-12-18 NOTE — PROGRESS NOTES
Rick 36 PRE-ADMISSION TESTING GENERAL INSTRUCTIONS- [facility]-phone number:[phone]    GENERAL INSTRUCTIONS  [x] Antibacterial Soap shower Night before and/or AM of Surgery  [] Hernán wipe instruction sheet and wipes given. [x] Nothing by mouth after midnight, including gum, candy, mints, or water.  [] You may brush your teeth, gargle, but do NOT swallow water. []Hibiclens shower  the night before and the morning of surgery. Do not use             Hibiclens on your face or head. [x]No smoking, chewing tobacco, illegal drugs, or alcohol within 24 hours of your surgery. [x] Jewelry, valuables or body piercing's should not be brought to the hospital. All body and/or tongue piercing's must be removed prior to arriving to hospital.  ALL hair pins must be removed. [x] Do not wear makeup, lotions, powders, deodorant. Nail polish as directed by the nurse. [x] Arrange transportation with a responsible adult  to and from the hospital. If you do not have a responsible adult  to transport you, you will need to make arrangements with a medical transportation company (i.e. [employer]. A Uber/taxi/bus is not appropriate unless you are accompanied by a responsible adult ). Arrange for someone to be with you for the remainder of the day and for 24 hours after your procedure due to having had anesthesia. Who will be your  for transportation?____________DAUGHTER______   Who will be staying with you for 24 hrs after your procedure?______________DAUGHTER____  [] Bring insurance card and photo ID.  [] Transfusion Bracelet: Please bring with you to hospital, day of surgery  [] Bring urine specimen day of surgery. Any small container is acceptable. [] Use inhalers the morning of surgery and bring with you to hospital.  [] Bring copy of living will or healthcare power of  papers to be placed in your electronic record.   [] CPAP/BI-PAP: Please bring your machine if you are to spend the night in the hospital.     PARKING INSTRUCTIONS:   [x] Arrival Time:___0530 VIA 12[address] DOOR__________  · [] Parking lot '\"I\"  is located on [facility] (the corner of [address] and [facility]). To enter, press the button and the gate will lift. A free token will be provided to exit the lot. One car per patient is allowed to park in this lot. All other cars are to park on [address] either in the parking garage or the handicap lot. [] To reach the [address] lobby from [address] [place] [address] Street, upon entering the hospital, take elevator B to the 3rd floor. EDUCATION INSTRUCTIONS:      [] Knee or hip replacement booklet & exercise pamphlets given. [] [address] placed in chart. [] Pre-admission Testing educational folder given  [] Incentive Spirometry,coughing & deep breathing exercises reviewed. []Medication information sheet(s)   []Fluoroscopy-Xray used in surgery reviewed with patient. Educational pamphlet placed in chart. []Pain: Post-op pain is normal and to be expected. You will be asked to rate your pain from 0-10(a zero is not acceptable-education is needed). Your post-op pain goal is:  [] Ask your nurse for your pain medication. [] Joint camp offered. [] Joint replacement booklets given. [] Other:___________________________    MEDICATION INSTRUCTIONS:   [x]Bring a complete list of your medications, please write the last time you took the medicine, give this list to the nurse. [x] Take the following medications the morning of surgery with 1-2 ounces of water:   [x] Stop herbal supplements and vitamins 5 days before your surgery. [] DO NOT take any diabetic medicine the morning of surgery. Follow instructions for insulin the day before surgery. [] If you are diabetic and your blood sugar is low or you feel symptomatic, you may drink 1-2 ounces of apple juice or take a glucose tablet.   The morning of your procedure, you may call the pre-op area if you have concerns about your blood sugar [phone]. [] Use your inhalers the morning of surgery. Bring your emergency inhaler with you day of surgery. [] Follow physician instructions regarding any blood thinners you may be taking. WHAT TO EXPECT:  [x] The day of surgery you will be greeted and checked in by the [facility] [employer] [facility].  In addition, you will be registered in the [place] by a Patient Access Representative. Please bring your photo ID and insurance card. A nurse will greet you in accordance to the time you are needed in the pre-op area to prepare you for surgery. Please do not be discouraged if you are not greeted in the order you arrive as there are many variables that are involved in patient preparation. Your patience is greatly appreciated as you wait for your nurse. Please bring in items such as: books, magazines, newspapers, electronics, or any other items  to occupy your time in the waiting area. []  Delays may occur with surgery and staff will make a sincere effort to keep you informed of delays. If any delays occur with your procedure, we apologize ahead of time for your inconvenience as we recognize the value of your time.

## 2020-12-18 NOTE — PROGRESS NOTES
Patient agreed to COVID test on 12-29 at the  [facility]  located at  [address] [place] [address]. between the hours of 6 am- 2:30 pm, instructed to bring ID. Patient instructed to self isolate until day of surgery.

## 2020-12-21 ENCOUNTER — HOSPITAL ENCOUNTER (OUTPATIENT)
Dept: INTERVENTIONAL RADIOLOGY/VASCULAR | Age: 85
Discharge: HOME | End: 2020-12-23
Payer: MEDICARE

## 2020-12-21 VITALS
TEMPERATURE: 97.4 F | SYSTOLIC BLOOD PRESSURE: 167 MMHG | OXYGEN SATURATION: 97 % | RESPIRATION RATE: 19 BRPM | HEART RATE: 61 BPM | DIASTOLIC BLOOD PRESSURE: 79 MMHG

## 2020-12-21 LAB
ALBUMIN SERPL-MCNC: 4.4 G/DL (ref 3.5–5.2)
ALP BLD-CCNC: 91 U/L (ref 35–104)
ALT SERPL-CCNC: 13 U/L (ref 0–32)
ANION GAP SERPL CALCULATED.3IONS-SCNC: 11 MMOL/L (ref 7–16)
AST SERPL-CCNC: 19 U/L (ref 0–31)
BASOPHILS ABSOLUTE: 0.05 E9/L (ref 0–0.2)
BASOPHILS RELATIVE PERCENT: 0.9 % (ref 0–2)
BILIRUB SERPL-MCNC: 0.4 MG/DL (ref 0–1.2)
BUN BLDV-MCNC: 26 MG/DL (ref 8–23)
CALCIUM SERPL-MCNC: 9.5 MG/DL (ref 8.6–10.2)
CHLORIDE BLD-SCNC: 106 MMOL/L (ref 98–107)
CO2: 24 MMOL/L (ref 22–29)
CREAT SERPL-MCNC: 1 MG/DL (ref 0.5–1)
EOSINOPHILS ABSOLUTE: 0.09 E9/L (ref 0.05–0.5)
EOSINOPHILS RELATIVE PERCENT: 1.5 % (ref 0–6)
GFR AFRICAN AMERICAN: >60
GFR NON-AFRICAN AMERICAN: 52 ML/MIN/1.73
GLUCOSE BLD-MCNC: 91 MG/DL (ref 74–99)
HCT VFR BLD CALC: 39.6 % (ref 34–48)
HEMOGLOBIN: 12.3 G/DL (ref 11.5–15.5)
IMMATURE GRANULOCYTES #: 0.02 E9/L
IMMATURE GRANULOCYTES %: 0.3 % (ref 0–5)
INR BLD: 1
LYMPHOCYTES ABSOLUTE: 1.9 E9/L (ref 1.5–4)
LYMPHOCYTES RELATIVE PERCENT: 32.6 % (ref 20–42)
MCH RBC QN AUTO: 28.3 PG (ref 26–35)
MCHC RBC AUTO-ENTMCNC: 31.1 % (ref 32–34.5)
MCV RBC AUTO: 91.2 FL (ref 80–99.9)
MONOCYTES ABSOLUTE: 0.43 E9/L (ref 0.1–0.95)
MONOCYTES RELATIVE PERCENT: 7.4 % (ref 2–12)
NEUTROPHILS ABSOLUTE: 3.33 E9/L (ref 1.8–7.3)
NEUTROPHILS RELATIVE PERCENT: 57.3 % (ref 43–80)
PDW BLD-RTO: 13.4 FL (ref 11.5–15)
PLATELET # BLD: 164 E9/L (ref 130–450)
PMV BLD AUTO: 9.5 FL (ref 7–12)
POTASSIUM SERPL-SCNC: 4.6 MMOL/L (ref 3.5–5)
PROTHROMBIN TIME: 11 SEC (ref 9.3–12.4)
RBC # BLD: 4.34 E12/L (ref 3.5–5.5)
SODIUM BLD-SCNC: 141 MMOL/L (ref 132–146)
TOTAL PROTEIN: 7.1 G/DL (ref 6.4–8.3)
WBC # BLD: 5.8 E9/L (ref 4.5–11.5)

## 2020-12-21 PROCEDURE — 36415 COLL VENOUS BLD VENIPUNCTURE: CPT

## 2020-12-21 PROCEDURE — 80053 COMPREHEN METABOLIC PANEL: CPT

## 2020-12-21 PROCEDURE — 47536 EXCHANGE BILIARY DRG CATH: CPT

## 2020-12-21 PROCEDURE — 47536 EXCHANGE BILIARY DRG CATH: CPT | Performed by: RADIOLOGY

## 2020-12-21 PROCEDURE — 85025 COMPLETE CBC W/AUTO DIFF WBC: CPT

## 2020-12-21 PROCEDURE — 85610 PROTHROMBIN TIME: CPT

## 2020-12-21 PROCEDURE — 2709999900 IR BILIARY DRAIN EXCHANGE

## 2020-12-21 PROCEDURE — 6360000004 HC RX CONTRAST MEDICATION: Performed by: RADIOLOGY

## 2020-12-21 PROCEDURE — 7100000010 HC PHASE II RECOVERY - FIRST 15 MIN

## 2020-12-21 PROCEDURE — 2500000003 HC RX 250 WO HCPCS: Performed by: RADIOLOGY

## 2020-12-21 RX ORDER — LIDOCAINE HYDROCHLORIDE 20 MG/ML
INJECTION, SOLUTION INFILTRATION; PERINEURAL
Status: COMPLETED | OUTPATIENT
Start: 2020-12-21 | End: 2020-12-21

## 2020-12-21 RX ADMIN — LIDOCAINE HYDROCHLORIDE 1 ML: 20 INJECTION, SOLUTION INFILTRATION; PERINEURAL at 14:29

## 2020-12-21 RX ADMIN — IOPAMIDOL 25 ML: 612 INJECTION, SOLUTION INTRAVENOUS at 15:19

## 2020-12-21 NOTE — H&P
Reactions    Enalapril Maleate Other (See Comments)     Other reaction(s): cough/rash    Penicillins      ?  Sulfa Antibiotics      ? Past Medical History:   Diagnosis Date    Abdominal pain 1/29/2015    Acute cholecystitis 10/29/2019    Diet-controlled diabetes mellitus ([address] 8/20/2019    no med    Gastritis 1/30/2015    Glaucoma     Hematemesis 1/29/2015    [place] (hard of hearing)     Hyperlipidemia     Hypertension     PONV (postoperative nausea and vomiting)        Past Surgical History:   Procedure Laterality Date    CT [facility] NEW ACCESS  8/27/2020    CT [facility] NEW ACCESS 8/27/2020 Rob Mccoy MD [facility] CT    CYSTOSCOPY  04/04/2017    cysto retro left ureteroscopy, stent removal with Dr. Holley Chavez  07/18/2017    stent placement    CYSTOSCOPY  10/19/2017    Left stent placement    CYSTOSCOPY N/A 9/22/2020    CYSTO URETHROSCOPY, RETROGRADE PYELOGRAM, BLADDER TUMOR RESECTION AND FULGURATION, POSSIBLE LEFT URETEROSCOPY AND LASER TUMOR ABLATION,  POSSIBLE LEFT STENT INSERTION performed by Robert Tejada MD at [address] [facility] [address] / REMOVAL STENT / STONE Left 8/20/2020    CYSTOSCOPY, TRANSURETHRAL RESECTION BLADDER TUMOR performed by Robert Tejada MD at [address] [facility] 4/17/2018    CYSTOSCOPY RETROGRADE PYELOGRAM BLADDER AND LEFT URETERAL WASHINGS INSERTION LEFT URETERAL STENT LEFT URETEROSCOPY performed by Robert Tejada MD at [address] [facility]  1/29/15       Family History   Problem Relation Age of Onset    Early Death Mother     Prostate Cancer Father     Kidney Cancer Sister     Cancer Brother     Alzheimer's Disease Brother     Cancer Sister        Social History     Socioeconomic History    Marital status:       Spouse name: Not on file    Number of children: Not on file    Years of education: Not on file    Highest education level: Not on file Occupational History    Not on file   Social Needs    Financial resource strain: Not on file    Food insecurity     Worry: Not on file     Inability: Not on file    Transportation needs     Medical: Not on file     Non-medical: Not on file   Tobacco Use    Smoking status: Never Smoker    Smokeless tobacco: Never Used   Substance and Sexual Activity    Alcohol use: Never     Frequency: Never    Drug use: Never    Sexual activity: Not Currently   Lifestyle    Physical activity     Days per week: Not on file     Minutes per session: Not on file    Stress: Not on file   Relationships    Social connections     Talks on phone: Not on file     Gets together: Not on file     Attends Mu-ism service: Not on file     Active member of club or organization: Not on file     Attends meetings of clubs or organizations: Not on file     Relationship status: Not on file    Intimate partner violence     Fear of current or ex partner: Not on file     Emotionally abused: Not on file     Physically abused: Not on file     Forced sexual activity: Not on file   Other Topics Concern    Not on file   Social History Narrative        Stress Test - 09 neg.  (Jaimes)    Duplex Carotid Ultasound - 09 no evidence of significant stenosis    HTN    LIPID    GLAUCOMA    C HYSTER    CVA--    OA L KNEE    DIET DM    SON SHAHZAD HINES AND WILL LIVE WITH HER    Pam Luis  1928 Page #2    LOW VIT B12 AND VIT D 4-10    DAUGHTER WORKS AT DR MEDEIROS OFFICE    GYN DR Lambert Outlaw    US KIDNEY WITH RIGHT RENAL CYST AND SEVERAL RIGHT RENAL STONES    ADMIT WITH GI BLEED----AND EGD WITH ESOPHAGITIS AND POSS MASS BUT BX NEG---    2-15----DR HORN------RE DONE DR WALLER -15    ER WITH FALL WITH 20 % COMP FX L-1 AND CT ABD IWTH LEFT HYDRONEPHROSIS AND    URETER WITH NO STONE------GB STONE OR SLUDGE NOTED    CT  WITH DISTAL LEFT URETER MASS---DX WITH URETER CA---- LEFT----DR MAGGI FITZPATRICK-----STENT    SON DX WITH BLADDER CA 12-16    Admit  10-19 with GB sx and new cary murmur   Seen by  Lisha panchal and echo with mod AR       ROS: Non-contributory other than as noted above    PHYSICAL EXAM:      Heent: Alert and orientated. Heart:  Rapid regular rhythm    Lungs:  demonstrate no contraindications to proceed      Abdomen:  normal      DATA:  CBC:   Lab Results   Component Value Date    WBC 5.8 12/21/2020    RBC 4.34 12/21/2020    HGB 12.3 12/21/2020    HCT 39.6 12/21/2020    MCV 91.2 12/21/2020    MCH 28.3 12/21/2020    MCHC 31.1 12/21/2020    RDW 13.4 12/21/2020     12/21/2020    MPV 9.5 12/21/2020     CBC with Differential:    Lab Results   Component Value Date    WBC 5.8 12/21/2020    RBC 4.34 12/21/2020    HGB 12.3 12/21/2020    HCT 39.6 12/21/2020     12/21/2020    MCV 91.2 12/21/2020    MCH 28.3 12/21/2020    MCHC 31.1 12/21/2020    RDW 13.4 12/21/2020    LYMPHOPCT 32.6 12/21/2020    MONOPCT 7.4 12/21/2020    BASOPCT 0.9 12/21/2020    MONOSABS 0.43 12/21/2020    LYMPHSABS 1.90 12/21/2020    EOSABS 0.09 12/21/2020    BASOSABS 0.05 12/21/2020     Platelets:    Lab Results   Component Value Date     12/21/2020     BUN/Creatinine:    Lab Results   Component Value Date    BUN 26 12/21/2020    CREATININE 1.0 12/21/2020       ASSESSMENT AND PLAN:  3.  81 yo F with a cholecystostomy tube in place s/p cholecystitis. Here for drainage catheter exchange. 2.  Procedure options, risks and benefits reviewed with patient. Patient expresses understanding.     Electronically signed by Carlos Bartlett MD on 12/21/2020 at 1:07 PM

## 2020-12-21 NOTE — PROGRESS NOTES
1450 - Patient returned from procedure. Dressing checked, clean, dry, and intact. Patient stable. No s/s of complications noted or reported. 1505 -Patient drinking orange juice well with no s/s of complications noted or reported. 1510 - Patient discharged, site was checked with every set of vitals. Site clean dry and intact. Discharge papers reviewed with patient, questions answered, discharge paper signed. Patient taken to door via ambulation. Patient in stable condition, no s/s of complications noted or reported.

## 2020-12-21 NOTE — PROGRESS NOTES
Patient was identified via 2 patient identifiers and arrived to pre-work up area in stable condition via wheelchair with her daughter. She is here for an image-guided cholecystostomy drain exchange (routine). Pre-procedure routine/checklist was completed. The patient's medical history, allergies, medications, labs, and NPO status were all reviewed and verified. The patient has been NPO since midnight. The procedure was explained, informed consent was obtained and questions were answered. The patient verbalized understanding. BP (!) 189/75   Pulse 61   Temp 97.4 °F (36.3 °C) (Temporal)   Resp 18   SpO2 97%  Room Air. ALLERGIES: Enalapril maleate, Penicillins, and Sulfa antibiotics     The patient has a 22-gauge IV in the right AC, flushed well with good blood return and patent. Patient is resting comfortably in bed at this time, lying with call bell next to them and bed in the lowest position and locked. Patient's belongings were placed in a labeled belongings bag and put on the back of the cart.

## 2020-12-29 ENCOUNTER — HOSPITAL ENCOUNTER (OUTPATIENT)
Age: 85
Discharge: HOME | End: 2020-12-31
Payer: MEDICARE

## 2020-12-29 DIAGNOSIS — U07.1 COVID-19: ICD-10-CM

## 2020-12-29 PROCEDURE — U0003 INFECTIOUS AGENT DETECTION BY NUCLEIC ACID (DNA OR RNA); SEVERE ACUTE RESPIRATORY SYNDROME CORONAVIRUS 2 (SARS-COV-2) (CORONAVIRUS DISEASE [COVID-19]), AMPLIFIED PROBE TECHNIQUE, MAKING USE OF HIGH THROUGHPUT TECHNOLOGIES AS DESCRIBED BY CMS-2020-01-R: HCPCS

## 2020-12-29 NOTE — H&P
HER DAUGHTER AND SON ARE WITH HER TODAY   LAST URETEROSCOPY WAS ON 9/22/20 WHICH SHOWED RECURRENT DISEASE   URINE CYTOLOGY ON 4/17/18 WAS NEGATIVE   4/4/17: URINE CYTOLOGY WAS NEGATIVE   URINE CYTOLOGY ON 10/25/16 WAS NEGATIVE   URINE CULTURE ON 4/4/17 GREW ENTEROCOCCUS AND PSEUDOMONAS   2/22/17: COMPLETED 6 WEEKLY BCG TREATMENTS   + NOCTURIA X1   - GROSS HEMATURIA   - PAIN   LASIX RENAL SCAN 11/2/16 WAS NORMAL WITH 49% LEFT AND 51% LEFT RENAL FUNCTION   PATHOLOGY FROM LEFT URETERAL TUMOR BIOPSY ON 10/25/16 WAS POSITIVE FOR LOW GRADE PAPILLARY UROTHELIAL CARCINOMA, NON INVASIVE   CT SCAN ON 9/19/16 SHOWED LEFT HYDRONEPHROSIS AND AN ENHANCING LEFT DISTAL URETERAL TUMOR (NO METS)   BUN AND CREATININE ON 10/30/19 WERE 14 AND 0.8, RESPECTIVELY        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 The problem is on the left side. Her cancer was diagnosed 4 years ago. She has had laser ablation to treat her transitional cell carcinoma. She has not received radiation therapy. She did not receive chemotherapy for her cancer. She did not see blood in her urine. She is not having pain in new locations. She does have a good appetite. Her bowels are moving normally. She has not recently had unwanted weight loss. Her last cysto was 2 years ago. Her last radiologic test to evaluate the kidneys was 4 years ago.         ALLERGIES: Enalapril Maleate  Sulfa       MEDICATIONS: Oncovite tablet 2 tablet PO BID   Amlodipine Besilate   Losartan Potassium   Lumigan   Rhopressa   Simvastatin   Timolol Maleate   Vitamin D3         PSH: Bladder Instill AntiCA Agent - 2017, 2017, 2017, 2017, 2017, 2017  Cysto Uretero Biopsy Fulgura, Left, W/ LEFT JJ URETERAL STENT INSERTION - 2016  Cysto Uretero W/excise Tumor, Left, W/URETERAL STENT EXCHANGE - 2016  Cystoscopy Insert Stent - 10/19/2017  Cystoscopy Ureteroscopy, Left - 2018 - at [address] Anisa Fischer - [id number], 2017, Left - 2017  Hysterectomy       NON- PSH: Cholecystostomy tube change on 20      PMH: Dysuria - 2017  Malignant neoplasm of bladder, unspecified, S/P INTRAVESICAL BC17--17 - 2017  Malignant neoplasm of unspecified ureter, Left, LOW GRADE, NON-INVASIVE UROTHELIAL CARCINOMA - 2016  Hydroureter, Left - 2016  Cyst of kidney, acquired, Right  Family history of malignant neoplasm of kidney, SON HAS BLADDER CANCER  Urinary Tract Inf, Unspec site       NON- PMH: Dvrtclos of lg int w/o perforation or abscess w/o bleeding  Essential (primary) hypertension  Gastro-esophageal reflux disease without esophagitis  Other cerebrovascular disease  Other fracture of unspecified lumbar vertebra, sequela  Other specified glaucoma  Pure hypercholesterolemia, unspecified  Type 2 diabetes mellitus without complications       FAMILY HISTORY: Bladder Cancer - Son  Heart Disease - Runs in Family  Kidney Cancer - Runs in Family     SOCIAL HISTORY: Marital Status:   Preferred Language: English; Ethnicity: Unknown; Race: White  Current Smoking Status: Patient has never smoked. Does not use smokeless tobacco.  Has never drank. Does not use drugs. Drinks 1 caffeinated drink per day. Has not had a blood transfusion. Patient's occupation is/was RETIRED. Notes: FOUR CHILDREN     REVIEW OF SYSTEMS:     Constitutional:   Patient denies fever, chills, and weight loss. Eyes:   Patient denies wearing glasses. Ears, Nose, Mouth, Throat:   Patient denies hearing loss. Cardiovascular:   Patient denies chest pains, swollen ankles, and irregular heartbeat. Respiratory:   Patient denies shortness of breath, wheezing, and chronic cough. Gastrointestinal:   Patient denies abdominal pain, nausea/vomiting, and change in bowels.    Genitourinary:   Patient denies incontinence, painful urination, blood in urine, gerard catheter, and suprapubic catheter. Musculoskeletal:   Patient denies using wheelchair, using walker, and using cane. Integumentary/Skin:   Patient denies rash, persistent itching, and skin cancer history. Neurological:   Patient denies numbness, tingling, dizziness, and altered mental status. Hematologic/Lymphatic:   Patient denies swollen glands, abnormal bleeding, and history blood transfusion. VITAL SIGNS:         Weight 145 lb / 65.77 kg   Height 63 in / 160.02 cm   Pulse 72 /min   Resp. Rate 18 /min   BMI 25.7 kg/m²     MULTI-SYSTEM PHYSICAL EXAMINATION:     Constitutional: Well-nourished. No physical deformities. Normally developed. Good grooming. Neck: Neck symmetrical, not swollen. Normal tracheal position. Respiratory: No labored breathing, no use of accessory muscles. Cardiovascular: Normal temperature, normal extremity pulses, no swelling, no varicosities. Lymphatic: No enlargement of neck, axillae, groin. Skin: No paleness, no jaundice, no cyanosis. No lesion, no ulcer, no rash. Neurologic / Psychiatric: Oriented to time, oriented to place, oriented to person. No depression, no anxiety, no agitation. Gastrointestinal: No mass, no tenderness, no rigidity, non obese abdomen. Eyes: Normal conjunctivae. Normal eyelids. Ears, Nose, Mouth, and Throat: Left ear no scars, no lesions, no masses. Right ear no scars, no lesions, no masses. Nose no scars, no lesions, no masses. Normal hearing. Normal lips. Musculoskeletal: Normal gait and station of head and neck. PAST DATA REVIEWED:   Source Of History:  Patient, Family/Caregiver   Lab Test Review:   Creatinine, Blood Urea Nitrogen (BUN)   Records Review:   Pathology Reports, Previous Patient Records   X-Ray Review: C.T. Abdomen/Pelvis: Reviewed Report.         PROCEDURES: None     ASSESSMENT:       ICD-10 Details   1 :   Malignant neoplasm of unspecified ureter - C66.9 Left, Stable   2   Urinary Tract Inf, Unspec site - N39.0 Improving     PLAN:             Orders  Labs Urine Cytology             Schedule  Return Visit/Planned Activity: Follow Up After Testing   Procedure: Unspecified Date at 3 - [address] [facility]. Shwetha Hidalgo - [id number] - Cysto Uretero Biopsy Fulgura - [id number], left, RETROGRADE PYELOGRAPHY, BLADDER BIOPSY AND FULGURATION, JJ URETERAL STENT CHANGE, LEFT KIDNEY WASHINGS FOR CYTOLOGY       The patient and I talked at length about etiologies of urinary tract infection. We discussed bacterial and viral UTIs. We discussed the possible relationship between urinary tract infection and bladder outlet obstruction, neurogenic bladder, urethral stenosis, urethral stricture, meatal stenosis, urinary tract stones, congenital urinary tract abnormalities, acquired urinary tract abnormalities, hydronephrosis, ureteral obstruction, and the development of chronic cystitis. Alternative treatment options were discussed with the patient in detail. All questions were answered    Antibiotics, anti-inflammatories, and urinary analgesics were discussed with the patient. The patient gives informed consent to proceed with conservative treatment of their urinary tract infection with urinary tract imaging as indicated. The patient was given instructions to call for abdominal pain, pelvic pain, perirectal pain, nausea, vomiting, diarrhea, fever over 100? ?F, chills, hematuria, dysuria, frequency, urgency, or urge incontinence. She feels well today. She does not need additional antibiotics at this time. I recommended she undergo surveillance cystoscopy with left ureteroscopy and possible biopsy with fulguration as she has had done in the past. She may require additional postoperative intravesical BCG if she has recurrence of her tumor in the left distal ureter.

## 2020-12-30 LAB
SARS-COV-2: NOT DETECTED
SOURCE: NORMAL

## 2021-01-01 ENCOUNTER — CARE COORDINATION (OUTPATIENT)
Dept: CARE COORDINATION | Age: 86
End: 2021-01-01

## 2021-01-01 ENCOUNTER — ANESTHESIA EVENT (OUTPATIENT)
Dept: OPERATING ROOM | Age: 86
End: 2021-01-01
Payer: COMMERCIAL

## 2021-01-01 ENCOUNTER — OFFICE VISIT (OUTPATIENT)
Dept: PRIMARY CARE CLINIC | Age: 86
End: 2021-01-01
Payer: COMMERCIAL

## 2021-01-01 ENCOUNTER — APPOINTMENT (OUTPATIENT)
Dept: GENERAL RADIOLOGY | Age: 86
End: 2021-01-01
Attending: UROLOGY
Payer: COMMERCIAL

## 2021-01-01 ENCOUNTER — ANESTHESIA (OUTPATIENT)
Dept: OPERATING ROOM | Age: 86
End: 2021-01-01
Payer: COMMERCIAL

## 2021-01-01 ENCOUNTER — TELEPHONE (OUTPATIENT)
Dept: PRIMARY CARE CLINIC | Age: 86
End: 2021-01-01

## 2021-01-01 ENCOUNTER — HOSPITAL ENCOUNTER (OUTPATIENT)
Age: 86
Setting detail: OUTPATIENT SURGERY
Discharge: HOME | End: 2021-09-07
Attending: UROLOGY | Admitting: UROLOGY
Payer: COMMERCIAL

## 2021-01-01 VITALS — OXYGEN SATURATION: 91 % | DIASTOLIC BLOOD PRESSURE: 63 MMHG | SYSTOLIC BLOOD PRESSURE: 121 MMHG

## 2021-01-01 VITALS
RESPIRATION RATE: 16 BRPM | DIASTOLIC BLOOD PRESSURE: 70 MMHG | BODY MASS INDEX: 27.79 KG/M2 | HEIGHT: 62 IN | HEART RATE: 52 BPM | TEMPERATURE: 97 F | WEIGHT: 151 LBS | SYSTOLIC BLOOD PRESSURE: 156 MMHG | OXYGEN SATURATION: 93 %

## 2021-01-01 VITALS
TEMPERATURE: 96.5 F | OXYGEN SATURATION: 96 % | WEIGHT: 151.8 LBS | RESPIRATION RATE: 18 BRPM | HEART RATE: 97 BPM | BODY MASS INDEX: 27.76 KG/M2

## 2021-01-01 DIAGNOSIS — E55.9 VITAMIN D DEFICIENCY: ICD-10-CM

## 2021-01-01 DIAGNOSIS — I10 ESSENTIAL HYPERTENSION: ICD-10-CM

## 2021-01-01 DIAGNOSIS — F41.9 ANXIETY: ICD-10-CM

## 2021-01-01 DIAGNOSIS — N18.31 STAGE 3A CHRONIC KIDNEY DISEASE (HCC): Chronic | ICD-10-CM

## 2021-01-01 DIAGNOSIS — E03.9 ACQUIRED HYPOTHYROIDISM: ICD-10-CM

## 2021-01-01 DIAGNOSIS — R52 PAIN: ICD-10-CM

## 2021-01-01 DIAGNOSIS — E78.2 MIXED HYPERLIPIDEMIA: Chronic | ICD-10-CM

## 2021-01-01 DIAGNOSIS — C66.2 URETERAL CARCINOMA, LEFT (HCC): Chronic | ICD-10-CM

## 2021-01-01 DIAGNOSIS — C79.49 SECONDARY MALIGNANT NEOPLASM OF OTHER PARTS OF NERVOUS SYSTEM (HCC): ICD-10-CM

## 2021-01-01 DIAGNOSIS — I10 ESSENTIAL HYPERTENSION: Primary | Chronic | ICD-10-CM

## 2021-01-01 DIAGNOSIS — R73.03 PRE-DIABETES: ICD-10-CM

## 2021-01-01 DIAGNOSIS — E78.2 MIXED HYPERLIPIDEMIA: ICD-10-CM

## 2021-01-01 PROCEDURE — 1123F ACP DISCUSS/DSCN MKR DOCD: CPT | Performed by: FAMILY MEDICINE

## 2021-01-01 PROCEDURE — 99214 OFFICE O/P EST MOD 30 MIN: CPT | Performed by: FAMILY MEDICINE

## 2021-01-01 PROCEDURE — 3600000003 HC SURGERY LEVEL 3 BASE: Performed by: UROLOGY

## 2021-01-01 PROCEDURE — 1090F PRES/ABSN URINE INCON ASSESS: CPT | Performed by: FAMILY MEDICINE

## 2021-01-01 PROCEDURE — 7100000010 HC PHASE II RECOVERY - FIRST 15 MIN: Performed by: UROLOGY

## 2021-01-01 PROCEDURE — 3700000000 HC ANESTHESIA ATTENDED CARE: Performed by: UROLOGY

## 2021-01-01 PROCEDURE — C2617 STENT, NON-COR, TEM W/O DEL: HCPCS | Performed by: UROLOGY

## 2021-01-01 PROCEDURE — 7100000011 HC PHASE II RECOVERY - ADDTL 15 MIN: Performed by: UROLOGY

## 2021-01-01 PROCEDURE — 6360000002 HC RX W HCPCS

## 2021-01-01 PROCEDURE — C1769 GUIDE WIRE: HCPCS | Performed by: UROLOGY

## 2021-01-01 PROCEDURE — 88112 CYTOPATH CELL ENHANCE TECH: CPT

## 2021-01-01 PROCEDURE — G8417 CALC BMI ABV UP PARAM F/U: HCPCS | Performed by: FAMILY MEDICINE

## 2021-01-01 PROCEDURE — 2580000003 HC RX 258: Performed by: UROLOGY

## 2021-01-01 PROCEDURE — 2709999900 HC NON-CHARGEABLE SUPPLY: Performed by: UROLOGY

## 2021-01-01 PROCEDURE — 1036F TOBACCO NON-USER: CPT | Performed by: FAMILY MEDICINE

## 2021-01-01 PROCEDURE — 6360000002 HC RX W HCPCS: Performed by: UROLOGY

## 2021-01-01 PROCEDURE — G8427 DOCREV CUR MEDS BY ELIG CLIN: HCPCS | Performed by: FAMILY MEDICINE

## 2021-01-01 PROCEDURE — 3600000013 HC SURGERY LEVEL 3 ADDTL 15MIN: Performed by: UROLOGY

## 2021-01-01 PROCEDURE — 74420 UROGRAPHY RTRGR +-KUB: CPT

## 2021-01-01 PROCEDURE — 4040F PNEUMOC VAC/ADMIN/RCVD: CPT | Performed by: FAMILY MEDICINE

## 2021-01-01 PROCEDURE — 3700000001 HC ADD 15 MINUTES (ANESTHESIA): Performed by: UROLOGY

## 2021-01-01 DEVICE — URETERAL STENT
Type: IMPLANTABLE DEVICE | Site: URETER | Status: FUNCTIONAL
Brand: POLARIS™ ULTRA

## 2021-01-01 RX ORDER — ONDANSETRON 2 MG/ML
4 INJECTION INTRAMUSCULAR; INTRAVENOUS EVERY 6 HOURS PRN
Status: DISCONTINUED | OUTPATIENT
Start: 2021-01-01 | End: 2021-01-01 | Stop reason: HOSPADM

## 2021-01-01 RX ORDER — IBUPROFEN 400 MG/1
400 TABLET ORAL EVERY 8 HOURS PRN
Status: DISCONTINUED | OUTPATIENT
Start: 2021-01-01 | End: 2021-01-01 | Stop reason: HOSPADM

## 2021-01-01 RX ORDER — GENTAMICIN SULFATE 80 MG/50ML
80 INJECTION, SOLUTION INTRAVENOUS
Status: COMPLETED | OUTPATIENT
Start: 2021-01-01 | End: 2021-01-01

## 2021-01-01 RX ORDER — SIMVASTATIN 20 MG
20 TABLET ORAL NIGHTLY
Qty: 30 TABLET | Refills: 11 | Status: ON HOLD
Start: 2021-01-01 | End: 2022-01-01 | Stop reason: HOSPADM

## 2021-01-01 RX ORDER — IBUPROFEN 400 MG/1
400 TABLET ORAL EVERY 8 HOURS PRN
Qty: 10 TABLET | Refills: 3 | Status: SHIPPED | OUTPATIENT
Start: 2021-01-01 | End: 2022-01-01

## 2021-01-01 RX ORDER — AMLODIPINE BESYLATE 2.5 MG/1
2.5 TABLET ORAL EVERY MORNING
Qty: 90 TABLET | Refills: 3 | Status: SHIPPED | OUTPATIENT
Start: 2021-01-01

## 2021-01-01 RX ORDER — SODIUM CHLORIDE 9 MG/ML
INJECTION, SOLUTION INTRAVENOUS CONTINUOUS
Status: DISCONTINUED | OUTPATIENT
Start: 2021-01-01 | End: 2021-01-01 | Stop reason: HOSPADM

## 2021-01-01 RX ORDER — PHENAZOPYRIDINE HYDROCHLORIDE 100 MG/1
100 TABLET, FILM COATED ORAL 3 TIMES DAILY PRN
Qty: 10 TABLET | Refills: 0 | Status: SHIPPED | OUTPATIENT
Start: 2021-01-01 | End: 2021-01-01

## 2021-01-01 RX ORDER — PROPOFOL 10 MG/ML
INJECTION, EMULSION INTRAVENOUS CONTINUOUS PRN
Status: DISCONTINUED | OUTPATIENT
Start: 2021-01-01 | End: 2021-01-01 | Stop reason: SDUPTHER

## 2021-01-01 RX ORDER — HYDROCODONE BITARTRATE AND ACETAMINOPHEN 5; 325 MG/1; MG/1
1 TABLET ORAL PRN
Status: DISCONTINUED | OUTPATIENT
Start: 2021-01-01 | End: 2021-01-01 | Stop reason: HOSPADM

## 2021-01-01 RX ORDER — LOSARTAN POTASSIUM 100 MG/1
100 TABLET ORAL DAILY
Qty: 30 TABLET | Refills: 11 | Status: ON HOLD
Start: 2021-01-01 | End: 2022-01-01 | Stop reason: HOSPADM

## 2021-01-01 RX ORDER — PHENAZOPYRIDINE HYDROCHLORIDE 100 MG/1
100 TABLET, FILM COATED ORAL 3 TIMES DAILY PRN
Status: DISCONTINUED | OUTPATIENT
Start: 2021-01-01 | End: 2021-01-01 | Stop reason: HOSPADM

## 2021-01-01 RX ORDER — CITALOPRAM 10 MG/1
10 TABLET ORAL EVERY MORNING
Qty: 90 TABLET | Refills: 3 | Status: SHIPPED | OUTPATIENT
Start: 2021-01-01

## 2021-01-01 RX ORDER — HYDROCODONE BITARTRATE AND ACETAMINOPHEN 5; 325 MG/1; MG/1
2 TABLET ORAL PRN
Status: DISCONTINUED | OUTPATIENT
Start: 2021-01-01 | End: 2021-01-01 | Stop reason: HOSPADM

## 2021-01-01 RX ADMIN — PROPOFOL 100 MCG/KG/MIN: 10 INJECTION, EMULSION INTRAVENOUS at 07:28

## 2021-01-01 RX ADMIN — SODIUM CHLORIDE: 9 INJECTION, SOLUTION INTRAVENOUS at 07:10

## 2021-01-01 RX ADMIN — GENTAMICIN SULFATE 80 MG: 80 INJECTION, SOLUTION INTRAVENOUS at 07:31

## 2021-01-01 ASSESSMENT — PULMONARY FUNCTION TESTS
PIF_VALUE: 1
PIF_VALUE: 0
PIF_VALUE: 1
PIF_VALUE: 0
PIF_VALUE: 1
PIF_VALUE: 0
PIF_VALUE: 1
PIF_VALUE: 1
PIF_VALUE: 0
PIF_VALUE: 1
PIF_VALUE: 0
PIF_VALUE: 1
PIF_VALUE: 0
PIF_VALUE: 1
PIF_VALUE: 0
PIF_VALUE: 0
PIF_VALUE: 1
PIF_VALUE: 0

## 2021-01-01 ASSESSMENT — PAIN SCALES - GENERAL
PAINLEVEL_OUTOF10: 0

## 2021-01-01 ASSESSMENT — PAIN - FUNCTIONAL ASSESSMENT: PAIN_FUNCTIONAL_ASSESSMENT: 0-10

## 2021-01-01 ASSESSMENT — ENCOUNTER SYMPTOMS
RESPIRATORY NEGATIVE: 1
EYES NEGATIVE: 1
GASTROINTESTINAL NEGATIVE: 1
ALLERGIC/IMMUNOLOGIC NEGATIVE: 1

## 2021-01-04 ENCOUNTER — TELEPHONE (OUTPATIENT)
Dept: PRIMARY CARE CLINIC | Age: 86
End: 2021-01-04

## 2021-01-04 ENCOUNTER — ANESTHESIA EVENT (OUTPATIENT)
Dept: OPERATING ROOM | Age: 86
End: 2021-01-04
Payer: MEDICARE

## 2021-01-04 DIAGNOSIS — E03.9 ACQUIRED HYPOTHYROIDISM: ICD-10-CM

## 2021-01-04 DIAGNOSIS — E78.2 MIXED HYPERLIPIDEMIA: ICD-10-CM

## 2021-01-04 DIAGNOSIS — M81.0 AGE-RELATED OSTEOPOROSIS WITHOUT CURRENT PATHOLOGICAL FRACTURE: ICD-10-CM

## 2021-01-04 DIAGNOSIS — I10 ESSENTIAL HYPERTENSION: Primary | ICD-10-CM

## 2021-01-04 NOTE — TELEPHONE ENCOUNTER
Pt having procedure done tomorrow, scraping urethra.   If Dr. Unique Spring is in need of any labs prior to her appt next week, please put lab order in and they'll draw tomorrow

## 2021-01-05 ENCOUNTER — APPOINTMENT (OUTPATIENT)
Dept: GENERAL RADIOLOGY | Age: 86
End: 2021-01-05
Attending: UROLOGY
Payer: MEDICARE

## 2021-01-05 ENCOUNTER — HOSPITAL ENCOUNTER (OUTPATIENT)
Age: 86
Setting detail: OUTPATIENT SURGERY
Discharge: HOME | End: 2021-01-05
Attending: UROLOGY | Admitting: UROLOGY
Payer: MEDICARE

## 2021-01-05 ENCOUNTER — ANESTHESIA (OUTPATIENT)
Dept: OPERATING ROOM | Age: 86
End: 2021-01-05
Payer: MEDICARE

## 2021-01-05 VITALS
DIASTOLIC BLOOD PRESSURE: 56 MMHG | BODY MASS INDEX: 25.76 KG/M2 | HEART RATE: 60 BPM | SYSTOLIC BLOOD PRESSURE: 114 MMHG | RESPIRATION RATE: 18 BRPM | TEMPERATURE: 97.1 F | OXYGEN SATURATION: 95 % | WEIGHT: 140 LBS | HEIGHT: 62 IN

## 2021-01-05 VITALS — OXYGEN SATURATION: 93 % | DIASTOLIC BLOOD PRESSURE: 58 MMHG | SYSTOLIC BLOOD PRESSURE: 102 MMHG

## 2021-01-05 DIAGNOSIS — U07.1 COVID-19: Primary | ICD-10-CM

## 2021-01-05 DIAGNOSIS — R52 PAIN: ICD-10-CM

## 2021-01-05 DIAGNOSIS — Z01.818 PREOP TESTING: ICD-10-CM

## 2021-01-05 LAB
METER GLUCOSE: 105 MG/DL (ref 74–99)
METER GLUCOSE: 96 MG/DL (ref 74–99)

## 2021-01-05 PROCEDURE — 74420 UROGRAPHY RTRGR +-KUB: CPT

## 2021-01-05 PROCEDURE — 6360000002 HC RX W HCPCS

## 2021-01-05 PROCEDURE — 6360000002 HC RX W HCPCS: Performed by: UROLOGY

## 2021-01-05 PROCEDURE — 3700000000 HC ANESTHESIA ATTENDED CARE: Performed by: UROLOGY

## 2021-01-05 PROCEDURE — C2617 STENT, NON-COR, TEM W/O DEL: HCPCS | Performed by: UROLOGY

## 2021-01-05 PROCEDURE — 3700000001 HC ADD 15 MINUTES (ANESTHESIA): Performed by: UROLOGY

## 2021-01-05 PROCEDURE — 88305 TISSUE EXAM BY PATHOLOGIST: CPT

## 2021-01-05 PROCEDURE — C1769 GUIDE WIRE: HCPCS | Performed by: UROLOGY

## 2021-01-05 PROCEDURE — 3600000013 HC SURGERY LEVEL 3 ADDTL 15MIN: Performed by: UROLOGY

## 2021-01-05 PROCEDURE — 7100000011 HC PHASE II RECOVERY - ADDTL 15 MIN: Performed by: UROLOGY

## 2021-01-05 PROCEDURE — 2709999900 HC NON-CHARGEABLE SUPPLY: Performed by: UROLOGY

## 2021-01-05 PROCEDURE — 3600000003 HC SURGERY LEVEL 3 BASE: Performed by: UROLOGY

## 2021-01-05 PROCEDURE — 7100000010 HC PHASE II RECOVERY - FIRST 15 MIN: Performed by: UROLOGY

## 2021-01-05 PROCEDURE — 82962 GLUCOSE BLOOD TEST: CPT

## 2021-01-05 PROCEDURE — 2580000003 HC RX 258: Performed by: UROLOGY

## 2021-01-05 PROCEDURE — 6360000002 HC RX W HCPCS: Performed by: ANESTHESIOLOGY

## 2021-01-05 DEVICE — IMPLANTABLE DEVICE: Type: IMPLANTABLE DEVICE | Status: FUNCTIONAL

## 2021-01-05 RX ORDER — ONDANSETRON 2 MG/ML
4 INJECTION INTRAMUSCULAR; INTRAVENOUS EVERY 6 HOURS PRN
Status: DISCONTINUED | OUTPATIENT
Start: 2021-01-05 | End: 2021-01-05 | Stop reason: SDUPTHER

## 2021-01-05 RX ORDER — FENTANYL CITRATE 50 UG/ML
INJECTION, SOLUTION INTRAMUSCULAR; INTRAVENOUS PRN
Status: DISCONTINUED | OUTPATIENT
Start: 2021-01-05 | End: 2021-01-05 | Stop reason: SDUPTHER

## 2021-01-05 RX ORDER — ONDANSETRON 2 MG/ML
4 INJECTION INTRAMUSCULAR; INTRAVENOUS EVERY 6 HOURS PRN
Status: DISCONTINUED | OUTPATIENT
Start: 2021-01-05 | End: 2021-01-05 | Stop reason: HOSPADM

## 2021-01-05 RX ORDER — CIPROFLOXACIN 2 MG/ML
200 INJECTION, SOLUTION INTRAVENOUS
Status: COMPLETED | OUTPATIENT
Start: 2021-01-05 | End: 2021-01-05

## 2021-01-05 RX ORDER — PHENAZOPYRIDINE HYDROCHLORIDE 100 MG/1
100 TABLET, FILM COATED ORAL 3 TIMES DAILY PRN
Status: DISCONTINUED | OUTPATIENT
Start: 2021-01-05 | End: 2021-01-05 | Stop reason: HOSPADM

## 2021-01-05 RX ORDER — PROPOFOL 10 MG/ML
INJECTION, EMULSION INTRAVENOUS CONTINUOUS PRN
Status: DISCONTINUED | OUTPATIENT
Start: 2021-01-05 | End: 2021-01-05 | Stop reason: SDUPTHER

## 2021-01-05 RX ORDER — SODIUM CHLORIDE 9 MG/ML
INJECTION, SOLUTION INTRAVENOUS CONTINUOUS
Status: DISCONTINUED | OUTPATIENT
Start: 2021-01-05 | End: 2021-01-05 | Stop reason: HOSPADM

## 2021-01-05 RX ORDER — PHENAZOPYRIDINE HYDROCHLORIDE 100 MG/1
100 TABLET, FILM COATED ORAL 3 TIMES DAILY PRN
Qty: 30 TABLET | Refills: 0 | Status: SHIPPED | OUTPATIENT
Start: 2021-01-05 | End: 2021-01-15

## 2021-01-05 RX ORDER — NALOXONE HYDROCHLORIDE 0.4 MG/ML
0.4 INJECTION, SOLUTION INTRAMUSCULAR; INTRAVENOUS; SUBCUTANEOUS PRN
Status: DISCONTINUED | OUTPATIENT
Start: 2021-01-05 | End: 2021-01-05 | Stop reason: HOSPADM

## 2021-01-05 RX ORDER — IBUPROFEN 600 MG/1
600 TABLET ORAL EVERY 8 HOURS PRN
Qty: 20 TABLET | Refills: 0 | Status: SHIPPED | OUTPATIENT
Start: 2021-01-05 | End: 2021-01-01

## 2021-01-05 RX ORDER — IBUPROFEN 400 MG/1
600 TABLET ORAL EVERY 8 HOURS PRN
Status: DISCONTINUED | OUTPATIENT
Start: 2021-01-05 | End: 2021-01-05 | Stop reason: HOSPADM

## 2021-01-05 RX ADMIN — FENTANYL CITRATE 25 MCG: 50 INJECTION, SOLUTION INTRAMUSCULAR; INTRAVENOUS at 08:09

## 2021-01-05 RX ADMIN — FENTANYL CITRATE 25 MCG: 50 INJECTION, SOLUTION INTRAMUSCULAR; INTRAVENOUS at 07:54

## 2021-01-05 RX ADMIN — CIPROFLOXACIN 200 MG: 2 INJECTION, SOLUTION INTRAVENOUS at 06:37

## 2021-01-05 RX ADMIN — FENTANYL CITRATE 50 MCG: 50 INJECTION, SOLUTION INTRAMUSCULAR; INTRAVENOUS at 07:39

## 2021-01-05 RX ADMIN — PROPOFOL 50 MCG/KG/MIN: 10 INJECTION, EMULSION INTRAVENOUS at 07:39

## 2021-01-05 RX ADMIN — NALXONE HYDROCHLORIDE 0.2 MG: 0.4 INJECTION INTRAMUSCULAR; INTRAVENOUS; SUBCUTANEOUS at 09:15

## 2021-01-05 RX ADMIN — PHENYLEPHRINE HYDROCHLORIDE 100 MCG: 10 INJECTION INTRAVENOUS at 07:52

## 2021-01-05 RX ADMIN — ONDANSETRON 4 MG: 2 INJECTION INTRAMUSCULAR; INTRAVENOUS at 09:16

## 2021-01-05 RX ADMIN — SODIUM CHLORIDE: 9 INJECTION, SOLUTION INTRAVENOUS at 06:30

## 2021-01-05 RX ADMIN — CIPROFLOXACIN 200 MG: 2 INJECTION, SOLUTION INTRAVENOUS at 07:30

## 2021-01-05 ASSESSMENT — PULMONARY FUNCTION TESTS
PIF_VALUE: 0

## 2021-01-05 ASSESSMENT — PAIN SCALES - GENERAL: PAINLEVEL_OUTOF10: 0

## 2021-01-05 NOTE — ANESTHESIA POSTPROCEDURE EVALUATION
Department of Anesthesiology  Postprocedure Note    Patient: Subha Galdamez  MRN: [id number]  YOB: 1928  Date of evaluation: 1/5/2021  Time:  9:47 AM     Procedure Summary     Date: 01/05/21 Room / Location: [facility] OR 11 / [facility]    Anesthesia Start:  Anesthesia Stop:     Procedure: CYSTOSCOPY, BILATERAL RETROGRADE PYELOGRAM, LEFT URETEROSCOPY, LASER ABLATION OF URETERAL TUMOR WITH STENT CHANGE (Bilateral ) Diagnosis: (URETERAL CA)    Surgeons: Mikel Martinez MD Responsible Provider:     Anesthesia Type: MAC ASA Status: 3          Anesthesia Type: MAC    Johnna Phase I: Johnna Score: 9    Johnna Phase II: Johnna Score: 8    Last vitals: Reviewed and per EMR flowsheets.        Anesthesia Post Evaluation    Patient location during evaluation: PACU  Patient participation: complete - patient participated  Level of consciousness: awake  Pain score: 3  Airway patency: patent  Nausea & Vomiting: no nausea and no vomiting  Complications: no  Cardiovascular status: blood pressure returned to baseline  Respiratory status: acceptable  Hydration status: euvolemic

## 2021-01-05 NOTE — H&P
10/19/2017  Cystoscopy Ureteroscopy, Left - 2018 - at [address] 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 Does not use smokeless tobacco.  Has never drank. Does not use drugs. Drinks 1 caffeinated drink per day. Has not had a blood transfusion. Patient's occupation is/was RETIRED. Notes: FOUR CHILDREN      REVIEW OF SYSTEMS:     Constitutional:   Patient denies fever, chills, and weight loss. Eyes:   Patient denies wearing glasses. Ears, Nose, Mouth, Throat:   Patient denies hearing loss. Cardiovascular:   Patient denies chest pains, swollen ankles, and irregular heartbeat. Respiratory:   Patient denies shortness of breath, wheezing, and chronic cough. Gastrointestinal:   Patient denies abdominal pain, nausea/vomiting, and change in bowels.    Genitourinary:   Patient denies incontinence, painful urination, blood in urine, gerard catheter, and suprapubic catheter. Musculoskeletal:   Patient denies using wheelchair, using walker, and using cane. Integumentary/Skin:   Patient denies rash, persistent itching, and skin cancer history. Neurological:   Patient denies numbness, tingling, dizziness, and altered mental status. Hematologic/Lymphatic:   Patient denies swollen glands, abnormal bleeding, and history blood transfusion.      VITAL SIGNS:         Weight 145 lb / 65.77 kg   Height 63 in / 160.02 cm   Pulse 72 /min   Resp. Rate 18 /min   BMI 25.7 kg/m²      MULTI-SYSTEM PHYSICAL EXAMINATION:     Constitutional: Well-nourished. No physical deformities. Normally developed. Good grooming. Neck: Neck symmetrical, not swollen. Normal tracheal position. Respiratory: No labored breathing, no use of accessory muscles. Cardiovascular: Normal temperature, normal extremity pulses, no swelling, no varicosities. Lymphatic: No enlargement of neck, axillae, groin. Skin: No paleness, no jaundice, no cyanosis. No lesion, no ulcer, no rash. Neurologic / Psychiatric: Oriented to time, oriented to place, oriented to person. No depression, no anxiety, no agitation. Gastrointestinal: No mass, no tenderness, no rigidity, non obese abdomen. Eyes: Normal conjunctivae. Normal eyelids. Ears, Nose, Mouth, and Throat: Left ear no scars, no lesions, no masses. Right ear no scars, no lesions, no masses. Nose no scars, no lesions, no masses. Normal hearing. Normal lips. Musculoskeletal: Normal gait and station of head and neck.         PAST DATA REVIEWED:   Source Of History:  Patient, Family/Caregiver   Lab Test Review:   Creatinine, Blood Urea Nitrogen (BUN)   Records Review:   Pathology Reports, Previous Patient Records   X-Ray Review: C.T.  Abdomen/Pelvis: Reviewed Report.         PROCEDURES: None      ASSESSMENT:       ICD-10 Details   1 :   Malignant neoplasm of unspecified ureter - C66.9 Left, Stable   2   Urinary Tract Inf, Unspec site - N39.0 Improving      PLAN:              Orders  Labs Urine Cytology               Schedule  Return Visit/Planned Activity: Follow Up After Testing   Procedure: Unspecified Date at 3 - [address] [facility]. [facility] - [address] - Cysto Uretero Biopsy Fulgura - [address], left, RETROGRADE PYELOGRAPHY, BLADDER BIOPSY AND FULGURATION, JJ URETERAL STENT CHANGE, LEFT KIDNEY WASHINGS FOR CYTOLOGY         The patient and I talked at length about etiologies of urinary tract infection. We discussed bacterial and viral UTIs. We discussed the possible relationship between urinary tract infection and bladder outlet obstruction, neurogenic bladder, urethral stenosis, urethral stricture, meatal stenosis, urinary tract stones, congenital urinary tract abnormalities, acquired urinary tract abnormalities, hydronephrosis, ureteral obstruction, and the development of chronic cystitis. Alternative treatment options were discussed with the patient in detail. All questions were answered     Antibiotics, anti-inflammatories, and urinary analgesics were discussed with the patient.      The patient gives informed consent to proceed with conservative treatment of their urinary tract infection with urinary tract imaging as indicated.     The patient was given instructions to call for abdominal pain, pelvic pain, perirectal pain, nausea, vomiting, diarrhea, fever over 100? ?F, chills, hematuria, dysuria, frequency, urgency, or urge incontinence.      She feels well today. She does not need additional antibiotics at this time. I recommended she undergo surveillance cystoscopy with left ureteroscopy and possible biopsy with fulguration as she has had done in the past. She may require additional postoperative intravesical BCG if she has recurrence of her tumor in the left distal ureter.

## 2021-01-05 NOTE — OP NOTE
[facility] [address]  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    Estimated Blood Loss:   Minimal    Complications: None    Drains: 8 Mexican by 26 cm JJ ureteral stent    Specimen(s): Bladder tumor    Clinical Note:   66-year-old female with recurrent bladder and left ureteral urothelial cell carcinoma. She is not a candidate for a radical nephro ureterectomy and has been undergoing debulking tumor ablations and resections. She denies any recent hematuria. She presents for the above listed procedure. The risks and benefits of the procedure including but not limited to infection, bleeding, bladder perforation or injury, need for stent with stent irritation, inability to remove all tumor requiring subsequent procedures, possible ureteral perforation etc. were discussed in detail and she elected to proceed    Operative Description:   She was taken to the operating room and placed on the OR table in the supine position. She received IV Cipro preoperatively. Following duction of anesthesia she was repositioned into the dorsolithotomy position. A  film KUB showed a left ureteral stent without evidence of stones. Her external genitalia and abdomen were then prepped and draped sterilely. A 21 Mexican cystoscope with 30 degree lens was inserted per urethra and into the bladder. There are no urethral strictures false passages or tumors. Panendoscopic evaluation of the bladder showed trabeculation and diverticuli formation.   There was a 1 cm tumor with a small satellite tumor next to this near the right lateral dome. The remainder the bladder actually looked pretty clear and devoid of any other residual tumors. There was a stent coming out of the left ureteral orifice with bullous edema around this. There is minimal stent encrustation. The right ureteral orifice appeared normal with clear reflux of urine. There were no other clots or stones or other abnormalities noted in the bladder. A rigid grasping forcep was inserted through the scope. The tumor was biopsied without bladder perforation. The specimen was sent to pathology for analysis. A Bugbee electrode was then utilized to cauterize the base of the tumor bed and surrounding urothelium. Small little satellite tumors in this area were also destroyed with electrocautery. There is meticulous hemostasis without bladder perforation. A sensor wire was inserted through the scope and into the left ureteral orifice alongside the stent and passed up into the left renal pelvis. This was secured to the surgical drapes as a safety wire. A grasping forcep was then utilized to extract the left stent which was removed and discarded. A 6 Telugu semirigid ureteroscope was advanced per urethra and into the bladder. High-pressure saline irrigation was utilized. The left ureter was atraumatically accessed under direct vision with the ureteroscope and I was able to pass ureteroscope up the ureter and into the left renal pelvis. The distal 6 cm of the ureter showed papillary appearing recurrent tumor. There is no evidence of a stricture. The ureter was widely patulous. I was able to pass the scope easily without perforating the ureter. The proximal and midportion of the ureter showed no evidence of any obvious tumor recurrence. I pulled the ureteroscope back to the area of the tumor. A 200 µm holmium laser fiber was deployed. Tumor ablation settings were placed on the laser.   The tumor was ablated circumferentially without perforation as best as possible until all visible macroscopic tumor was destroyed with the laser. I did not do any biopsies. Hemostasis was achieved with laser as well and there is minimal if any bleeding. This was done until the entire ureter was treated as best as possible. The ureteroscope was removed. The cystoscope was reassembled and advanced per urethra and into the bladder over the wire. An 8 [place] by 26 cm JJ ureteral stent was then passed over the wire and into the left renal pelvis. The wire was removed. Fluoroscopy confirmed coiling the stent proximally left renal pelvis and it was visualized to coil distally in the bladder in good position. The bladder was emptied and the cystoscope was removed. She tolerated the procedure well and was taken to the PACU in stable condition. She was discharged home with prescriptions for Pyridium and ibuprofen. She will have the same procedure performed in roughly 3 to 4 months which I discussed with her family postoperatively.       Susy Bianchi MD  1/5/2021  9:00 AM

## 2021-01-05 NOTE — PROGRESS NOTES
Discharge instructions given.  Pt and family member verbalized understanding of discharge instructions

## 2021-01-05 NOTE — ANESTHESIA PRE PROCEDURE
Department of Anesthesiology  Preprocedure Note       Name:  Javon Adam   Age:  80 y.o.  :  3/20/1928                                          MRN:  [id number]         Date:  2021      Surgeon: Susan Cristina):  Ethan Harris MD    Procedure: Procedure(s):  CYSTOSCOPY, BILATERAL RETROGRADE PYELOGRAM, LEFT URETEROSCOPY, LASER ABLATION OF URETERAL TUMOR WITH STENT CHANGE    Medications prior to admission:   Prior to Admission medications    Medication Sig Start Date End Date Taking? Authorizing Provider   amLODIPine (NORVASC) 2.5 MG tablet Take 1 tablet by mouth every morning 20  Yes Ganga Lujan DO   losartan (COZAAR) 100 MG tablet Take 1 tablet by mouth daily 20  Yes Ganga Lujan DO   simvastatin (ZOCOR) 20 MG tablet Take 1 tablet by mouth nightly 20  Yes Ganga Lujan DO   citalopram (CELEXA) 10 MG tablet Take 1 tablet by mouth every morning 20  Yes Ganga Lujan DO   ammonium lactate (LAC-HYDRIN) 12 % lotion Apply topically daily.  4/3/20  Yes Ganga Lujan DO   Multiple Vitamins-Minerals (ONCOVITE PO) Take by mouth daily   Yes Historical Provider, MD   bimatoprost (LUMIGAN) 0.01 % SOLN ophthalmic drops Place 1 drop into both eyes nightly   Yes Historical Provider, MD   dorzolamide-timolol (COSOPT) 22.3-6.8 MG/ML ophthalmic solution Place 1 drop into both eyes 2 times daily   Yes Historical Provider, MD   RHOPRESSA 0.02 % SOLN Apply 1 drop to eye daily Indications: administer one drop in both eyes every morning 10/30/19  Yes Historical Provider, MD   Cholecalciferol (VITAMIN D3) 50 MCG ( UT) CAPS Take 1 capsule by mouth daily 19   Ganga Lujan DO       Current medications:    Current Facility-Administered Medications   Medication Dose Route Frequency Provider Last Rate Last Admin    0.9 % sodium chloride infusion   Intravenous Continuous Natashael Berta Sheikh  mL/hr at 21 0630 New Bag at 21 0630    ciprofloxacin (CIPRO) IVPB 200 mg  200 mg Intravenous On Call to [address],  mL/hr at 01/05/21 0637 200 mg at 01/05/21 4370       Allergies: Allergies   Allergen Reactions    Enalapril Maleate Other (See Comments)     Other reaction(s): cough/rash    Penicillins      ?  Sulfa Antibiotics      ?        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    Ureteral tumor D49.59    Abdominal pain R10.9    Hydronephrosis N13.30    Hydroureter N13.4    Lesion of lumbar spine M89.9    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 ([address] C79.49       Past Medical History:        Diagnosis Date    Abdominal pain 1/29/2015    Acute cholecystitis 10/29/2019    Diet-controlled diabetes mellitus ([address] 8/20/2019    no med    Gastritis 1/30/2015    Glaucoma     Hematemesis 1/29/2015    [place] (hard of hearing)     Hyperlipidemia     Hypertension     PONV (postoperative nausea and vomiting)        Past Surgical History:        Procedure Laterality Date    CT [facility] NEW ACCESS  8/27/2020    CT [facility] NEW ACCESS 8/27/2020 Omid Carson MD [facility] CT    CYSTOSCOPY  04/04/2017    cysto retro left ureteroscopy, stent removal with Dr. Mary Gasca  07/18/2017    stent placement    CYSTOSCOPY  10/19/2017    Left stent placement    CYSTOSCOPY N/A 9/22/2020    CYSTO URETHROSCOPY, RETROGRADE PYELOGRAM, BLADDER TUMOR RESECTION AND FULGURATION, POSSIBLE LEFT URETEROSCOPY AND LASER TUMOR ABLATION,  POSSIBLE LEFT STENT INSERTION performed by Sweta Beach MD at [address] / [facility] Left 2020    CYSTOSCOPY, TRANSURETHRAL RESECTION BLADDER TUMOR performed by Sweta Beach MD at [address] [facility] Left 2018    CYSTOSCOPY RETROGRADE PYELOGRAM BLADDER AND LEFT URETERAL WASHINGS INSERTION LEFT URETERAL STENT LEFT URETEROSCOPY performed by Sweta Beach MD at [address] [facility]  1/29/15       Social History:    Social History     Tobacco Use    Smoking status: Never Smoker    Smokeless tobacco: Never Used   Substance Use Topics    Alcohol use: Never     Frequency: Never                                Counseling given: Not Answered      Vital Signs (Current):   Vitals:    20 1143 21 0559 21 0620   BP:   132/63   Pulse:   59   Resp:   18   Temp:   36.4 °C (97.5 °F)   TempSrc:   Temporal   SpO2:   96%   Weight: 140 lb (63.5 kg) 140 lb (63.5 kg)    Height: 5' 2\" (1.575 m) 5' 2\" (1.575 m)                                               BP Readings from Last 3 Encounters:   21 132/63   20 (!) 167/79   10/12/20 126/78       NPO Status: Time of last liquid consumption:                         Time of last solid consumption:                         Date of last liquid consumption: 21                        Date of last solid food consumption: 21     ECHO: 10/30/2019   Summary   Left ventricular size is grossly normal.   Mild left ventricular concentric hypertrophy noted. No regional wall motion abnormalities seen. Ejection fraction is visually estimated at 60%. Moderate aortic stenosis is present. [id number]  Sinus rhythm with occasional premature ventricular complexes and premature atrial complexes  Left axis deviation  Abnormal ECG  No previous ECGs available    CT: 2020  1.  Redemonstration of mass associated with anterior aspect of the   urinary bladder as well as nodular thickening involving posterior   inferior wall of urinary bladder at origin of left ureter. These   findings are likely related to known urinary bladder neoplasm.       2. Stable left hydroureter and left hydronephrosis.       3. New gallbladder wall thickening with surrounding inflammatory   changes and multiple calcified gallstones. Findings could suggest   acute cholecystitis.       4. Stable small free fluid is located within the pelvis.       5. Inflammatory changes surrounding the urinary bladder suggestive of   cystitis.       6. Diverticulosis without evidence of acute diverticulitis.       7. Redemonstration of lytic lesions involving L2 and T11 vertebral   bodies.       8. Stable compression fracture involving L1. CT: 8/21/2020      1. New mild posterior bibasilar atelectasis right more than left.       2. Worsening of left hydronephrosis, and left hydroureter, now severe.       3. Abnormal soft tissue density filling the dilated distal ureter   probably 6 cm down to the ureterovesical junction. Specious for some   thickening of the left bladder wall. 4. Large hematoma in the urinary bladder adjacent to an extra large   Webb balloon. Also right lateral pelvic extraperitoneal hematoma. 5. Constipation and diverticulosis without diverticulitis. 6. Chronic cholelithiasis without cholecystitis. 7. Increased size of a chronic right renal cyst.   8. No renal or ureter calculus.             BMI:   Wt Readings from Last 3 Encounters:   01/05/21 140 lb (63.5 kg)   10/12/20 138 lb (62.6 kg)   09/22/20 142 lb (64.4 kg)     Body mass index is 25.61 kg/m².     CBC:   Lab Results   Component Value Date    WBC 5.8 12/21/2020    RBC 4.34 12/21/2020    HGB 12.3 12/21/2020    HCT 39.6 12/21/2020    MCV 91.2 12/21/2020    RDW 13.4 12/21/2020     12/21/2020       CMP:   Lab Results   Component Value Date     12/21/2020    K 4.6 August 2020. Pt. Was too sick for gallbladder surgery so had drain placed. Drain just changed out two weeks ago. .   Endo/Other:    (+) Diabetes (pre diabetic-controlled with diet ), malignancy/cancer (uretheral cancer 2016, no chemo/radiation). Pt had no PAT visit        ROS comment: Having cystocscopy, bilateral retrograde plyeogram, left ureteroscopy, laser ablation of ureteral tumor with stent change 1/5/2021    Glaucoma-uses eye drops   Abdominal:       Abdomen: soft. Vascular: negative vascular ROS. Anesthesia Plan      MAC     ASA 3     (#20 left forearm)  Induction: intravenous. MIPS: Postoperative opioids intended. Anesthetic plan and risks discussed with patient and child/children. Use of blood products discussed with patient and child/children whom consented to blood products. Plan discussed with CRNA and attending. Cj Child RN   1/5/2021      Pt seen, examined, chart reviewed, plan discussed.   Rashaad Roldan  1/5/2021  7:11 AM

## 2021-01-05 NOTE — PROGRESS NOTES
CLINICAL PHARMACY NOTE: MEDS TO [address] Select Patient?: No  Total # of Prescriptions Filled: 2   The following medications were delivered to the patient:  · Phenazopyridine 10mg  · Ibuprofen 600mg  Total # of Interventions Completed: 3  Time Spent (min): 30    Additional Documentation:    Delivered to patient

## 2021-01-21 ENCOUNTER — IMMUNIZATION (OUTPATIENT)
Dept: PRIMARY CARE CLINIC | Age: 86
End: 2021-01-21
Payer: MEDICARE

## 2021-01-21 PROCEDURE — 0001A COVID-19, PFIZER VACCINE 30MCG/0.3ML DOSE: CPT | Performed by: NURSE PRACTITIONER

## 2021-01-21 PROCEDURE — 91300 COVID-19, PFIZER VACCINE 30MCG/0.3ML DOSE: CPT | Performed by: NURSE PRACTITIONER

## 2021-02-03 DIAGNOSIS — Z01.818 PRE-OP TESTING: Primary | ICD-10-CM

## 2021-02-11 ENCOUNTER — TELEPHONE (OUTPATIENT)
Dept: PRIMARY CARE CLINIC | Age: 86
End: 2021-02-11

## 2021-02-11 ENCOUNTER — IMMUNIZATION (OUTPATIENT)
Dept: PRIMARY CARE CLINIC | Age: 86
End: 2021-02-11
Payer: MEDICARE

## 2021-02-11 DIAGNOSIS — Z20.822 EXPOSURE TO COVID-19 VIRUS: Primary | ICD-10-CM

## 2021-02-11 PROCEDURE — 91300 COVID-19, PFIZER VACCINE 30MCG/0.3ML DOSE: CPT | Performed by: NURSE PRACTITIONER

## 2021-02-11 PROCEDURE — 0002A COVID-19, PFIZER VACCINE 30MCG/0.3ML DOSE: CPT | Performed by: NURSE PRACTITIONER

## 2021-02-11 NOTE — TELEPHONE ENCOUNTER
Done but make sure when they go to the lab to get that test done and the ones that I put in last month.   Frequently the lab will make a mistake and only do one thing

## 2021-02-11 NOTE — TELEPHONE ENCOUNTER
Patientss daughter asking if you can add an order for a covid antibody test to her orders. She will be going next week for blood work.

## 2021-02-16 ENCOUNTER — TELEPHONE (OUTPATIENT)
Dept: PRIMARY CARE CLINIC | Age: 86
End: 2021-02-16

## 2021-02-16 DIAGNOSIS — Z20.822 EXPOSURE TO COVID-19 VIRUS: ICD-10-CM

## 2021-02-16 DIAGNOSIS — E03.9 ACQUIRED HYPOTHYROIDISM: ICD-10-CM

## 2021-02-16 DIAGNOSIS — I10 ESSENTIAL HYPERTENSION: ICD-10-CM

## 2021-02-16 DIAGNOSIS — E78.2 MIXED HYPERLIPIDEMIA: ICD-10-CM

## 2021-02-16 DIAGNOSIS — M81.0 AGE-RELATED OSTEOPOROSIS WITHOUT CURRENT PATHOLOGICAL FRACTURE: ICD-10-CM

## 2021-02-16 LAB
ALBUMIN SERPL-MCNC: 4.4 G/DL (ref 3.5–5.2)
ALP BLD-CCNC: 125 U/L (ref 35–104)
ALT SERPL-CCNC: 19 U/L (ref 0–32)
ANION GAP SERPL CALCULATED.3IONS-SCNC: 15 MMOL/L (ref 7–16)
AST SERPL-CCNC: 22 U/L (ref 0–31)
BACTERIA: ABNORMAL /HPF
BASOPHILS ABSOLUTE: 0.06 E9/L (ref 0–0.2)
BASOPHILS RELATIVE PERCENT: 0.9 % (ref 0–2)
BILIRUB SERPL-MCNC: 0.5 MG/DL (ref 0–1.2)
BILIRUBIN URINE: NEGATIVE
BLOOD, URINE: ABNORMAL
BUN BLDV-MCNC: 22 MG/DL (ref 8–23)
CALCIUM SERPL-MCNC: 10.2 MG/DL (ref 8.6–10.2)
CHLORIDE BLD-SCNC: 108 MMOL/L (ref 98–107)
CHOLESTEROL, TOTAL: 205 MG/DL (ref 0–199)
CLARITY: CLEAR
CO2: 22 MMOL/L (ref 22–29)
COLOR: YELLOW
CREAT SERPL-MCNC: 1.1 MG/DL (ref 0.5–1)
EOSINOPHILS ABSOLUTE: 0.13 E9/L (ref 0.05–0.5)
EOSINOPHILS RELATIVE PERCENT: 1.9 % (ref 0–6)
EPITHELIAL CELLS, UA: ABNORMAL /HPF
GFR AFRICAN AMERICAN: 56
GFR NON-AFRICAN AMERICAN: 46 ML/MIN/1.73
GLUCOSE BLD-MCNC: 101 MG/DL (ref 74–99)
GLUCOSE URINE: NEGATIVE MG/DL
HCT VFR BLD CALC: 43.2 % (ref 34–48)
HDLC SERPL-MCNC: 50 MG/DL
HEMOGLOBIN: 12.9 G/DL (ref 11.5–15.5)
IMMATURE GRANULOCYTES #: 0.03 E9/L
IMMATURE GRANULOCYTES %: 0.4 % (ref 0–5)
KETONES, URINE: NEGATIVE MG/DL
LDL CHOLESTEROL CALCULATED: 110 MG/DL (ref 0–99)
LEUKOCYTE ESTERASE, URINE: ABNORMAL
LYMPHOCYTES ABSOLUTE: 2 E9/L (ref 1.5–4)
LYMPHOCYTES RELATIVE PERCENT: 29.6 % (ref 20–42)
MCH RBC QN AUTO: 28.4 PG (ref 26–35)
MCHC RBC AUTO-ENTMCNC: 29.9 % (ref 32–34.5)
MCV RBC AUTO: 94.9 FL (ref 80–99.9)
MONOCYTES ABSOLUTE: 0.57 E9/L (ref 0.1–0.95)
MONOCYTES RELATIVE PERCENT: 8.4 % (ref 2–12)
NEUTROPHILS ABSOLUTE: 3.97 E9/L (ref 1.8–7.3)
NEUTROPHILS RELATIVE PERCENT: 58.8 % (ref 43–80)
NITRITE, URINE: POSITIVE
PDW BLD-RTO: 13.9 FL (ref 11.5–15)
PH UA: 7 (ref 5–9)
PLATELET # BLD: 205 E9/L (ref 130–450)
PMV BLD AUTO: 10.4 FL (ref 7–12)
POTASSIUM SERPL-SCNC: 4.8 MMOL/L (ref 3.5–5)
PROTEIN UA: ABNORMAL MG/DL
RBC # BLD: 4.55 E12/L (ref 3.5–5.5)
RBC UA: ABNORMAL /HPF (ref 0–2)
SARS-COV-2 ANTIBODY, TOTAL: NORMAL
SODIUM BLD-SCNC: 145 MMOL/L (ref 132–146)
SPECIFIC GRAVITY UA: 1.01 (ref 1–1.03)
T4 TOTAL: 7.2 MCG/DL (ref 4.5–11.7)
TOTAL PROTEIN: 7.9 G/DL (ref 6.4–8.3)
TRIGL SERPL-MCNC: 225 MG/DL (ref 0–149)
TSH SERPL DL<=0.05 MIU/L-ACNC: 1.94 UIU/ML (ref 0.27–4.2)
UROBILINOGEN, URINE: 0.2 E.U./DL
VITAMIN D 25-HYDROXY: 30 NG/ML (ref 30–100)
VLDLC SERPL CALC-MCNC: 45 MG/DL
WBC # BLD: 6.8 E9/L (ref 4.5–11.5)
WBC UA: >20 /HPF (ref 0–5)

## 2021-02-16 NOTE — TELEPHONE ENCOUNTER
Unable to keep urine as not sure what needs to be done with it. Lab did a urinalysis today as well so going to discard one left.

## 2021-02-17 RX ORDER — NITROFURANTOIN 25; 75 MG/1; MG/1
100 CAPSULE ORAL 2 TIMES DAILY
Qty: 14 CAPSULE | Refills: 0 | Status: SHIPPED | OUTPATIENT
Start: 2021-02-17 | End: 2021-01-01

## 2021-02-17 NOTE — TELEPHONE ENCOUNTER
Patients daughter Bret Posada called to get results on the urinalysis that her mom did yesterday. Please have a clinical staff member call the patient as we are unable to make clinical decisions or answer any clinical questions they might have.

## 2021-02-22 ENCOUNTER — OFFICE VISIT (OUTPATIENT)
Dept: PRIMARY CARE CLINIC | Age: 86
End: 2021-02-22
Payer: MEDICARE

## 2021-02-22 VITALS
SYSTOLIC BLOOD PRESSURE: 128 MMHG | DIASTOLIC BLOOD PRESSURE: 78 MMHG | BODY MASS INDEX: 26.7 KG/M2 | WEIGHT: 146 LBS | TEMPERATURE: 97.8 F

## 2021-02-22 DIAGNOSIS — C66.2 URETERAL CARCINOMA, LEFT (HCC): Chronic | ICD-10-CM

## 2021-02-22 DIAGNOSIS — R73.03 PRE-DIABETES: ICD-10-CM

## 2021-02-22 DIAGNOSIS — N18.31 STAGE 3A CHRONIC KIDNEY DISEASE (HCC): Chronic | ICD-10-CM

## 2021-02-22 DIAGNOSIS — I10 ESSENTIAL HYPERTENSION: Chronic | ICD-10-CM

## 2021-02-22 DIAGNOSIS — N30.00 ACUTE CYSTITIS WITHOUT HEMATURIA: ICD-10-CM

## 2021-02-22 DIAGNOSIS — E78.2 MIXED HYPERLIPIDEMIA: Chronic | ICD-10-CM

## 2021-02-22 DIAGNOSIS — N30.00 ACUTE CYSTITIS WITHOUT HEMATURIA: Primary | ICD-10-CM

## 2021-02-22 DIAGNOSIS — M81.0 AGE-RELATED OSTEOPOROSIS WITHOUT CURRENT PATHOLOGICAL FRACTURE: ICD-10-CM

## 2021-02-22 LAB
BILIRUBIN, POC: ABNORMAL
BLOOD URINE, POC: ABNORMAL
CLARITY, POC: ABNORMAL
COLOR, POC: YELLOW
GLUCOSE URINE, POC: ABNORMAL
KETONES, POC: ABNORMAL
LEUKOCYTE EST, POC: ABNORMAL
NITRITE, POC: ABNORMAL
PH, POC: 7
PROTEIN, POC: ABNORMAL
SPECIFIC GRAVITY, POC: 1.01
UROBILINOGEN, POC: ABNORMAL

## 2021-02-22 PROCEDURE — G8417 CALC BMI ABV UP PARAM F/U: HCPCS | Performed by: FAMILY MEDICINE

## 2021-02-22 PROCEDURE — 81002 URINALYSIS NONAUTO W/O SCOPE: CPT | Performed by: FAMILY MEDICINE

## 2021-02-22 PROCEDURE — 1036F TOBACCO NON-USER: CPT | Performed by: FAMILY MEDICINE

## 2021-02-22 PROCEDURE — 1123F ACP DISCUSS/DSCN MKR DOCD: CPT | Performed by: FAMILY MEDICINE

## 2021-02-22 PROCEDURE — G8428 CUR MEDS NOT DOCUMENT: HCPCS | Performed by: FAMILY MEDICINE

## 2021-02-22 PROCEDURE — G8484 FLU IMMUNIZE NO ADMIN: HCPCS | Performed by: FAMILY MEDICINE

## 2021-02-22 PROCEDURE — 99214 OFFICE O/P EST MOD 30 MIN: CPT | Performed by: FAMILY MEDICINE

## 2021-02-22 PROCEDURE — 1090F PRES/ABSN URINE INCON ASSESS: CPT | Performed by: FAMILY MEDICINE

## 2021-02-22 PROCEDURE — 4040F PNEUMOC VAC/ADMIN/RCVD: CPT | Performed by: FAMILY MEDICINE

## 2021-02-22 ASSESSMENT — ENCOUNTER SYMPTOMS
GASTROINTESTINAL NEGATIVE: 1
EYES NEGATIVE: 1
RESPIRATORY NEGATIVE: 1
ALLERGIC/IMMUNOLOGIC NEGATIVE: 1

## 2021-02-22 ASSESSMENT — PATIENT HEALTH QUESTIONNAIRE - PHQ9
2. FEELING DOWN, DEPRESSED OR HOPELESS: 0
SUM OF ALL RESPONSES TO PHQ QUESTIONS 1-9: 0

## 2021-02-22 NOTE — PROGRESS NOTES
dorzolamide-timolol (COSOPT) 22.3-6.8 MG/ML ophthalmic solution, Place 1 drop into both eyes 2 times daily, Disp: , Rfl:     RHOPRESSA 0.02 % SOLN, Apply 1 drop to eye daily Indications: administer one drop in both eyes every morning, Disp: , Rfl:   Allergies   Allergen Reactions    Enalapril Maleate Other (See Comments)     Other reaction(s): cough/rash    Penicillins      ?  Sulfa Antibiotics      ? Social History     Socioeconomic History    Marital status:      Spouse name: Not on file    Number of children: Not on file    Years of education: Not on file    Highest education level: Not on file   Occupational History    Not on file   Social Needs    Financial resource strain: Not on file    Food insecurity     Worry: Not on file     Inability: Not on file    Transportation needs     Medical: Not on file     Non-medical: Not on file   Tobacco Use    Smoking status: Never Smoker    Smokeless tobacco: Never Used   Substance and Sexual Activity    Alcohol use: Never     Frequency: Never    Drug use: Never    Sexual activity: Not Currently   Lifestyle    Physical activity     Days per week: Not on file     Minutes per session: Not on file    Stress: Not on file   Relationships    Social connections     Talks on phone: Not on file     Gets together: Not on file     Attends Spiritism service: Not on file     Active member of club or organization: Not on file     Attends meetings of clubs or organizations: Not on file     Relationship status: Not on file    Intimate partner violence     Fear of current or ex partner: Not on file     Emotionally abused: Not on file     Physically abused: Not on file     Forced sexual activity: Not on file   Other Topics Concern    Not on file   Social History Narrative        Stress Test - 8/7/09 neg.  (Jaimes)    Duplex Carotid Ultasound - 8/5/09 no evidence of significant stenosis    HTN    LIPID    GLAUCOMA    C HYSTER    CVA--    OA L KNEE    DIET DM BIOPSY,  LEFT URETEROSCOPY, LASER ABLATION OF URETERAL TUMOR WITH LEFT STENT CHANGE performed by Susy Bianchi MD at [address] [place] [address] / Poonam Colon / Justin Blunt Left 8/20/2020    CYSTOSCOPY, TRANSURETHRAL RESECTION BLADDER TUMOR performed by Susy Bianchi MD at [address] [facility] Left 4/17/2018    CYSTOSCOPY RETROGRADE PYELOGRAM BLADDER AND LEFT URETERAL WASHINGS INSERTION LEFT URETERAL STENT LEFT URETEROSCOPY performed by Susy Bianchi MD at [address]  1/29/15      Vitals:    02/22/21 1259   BP: 128/78   Temp: 97.8 °F (36.6 °C)   TempSrc: Oral   Weight: 146 lb (66.2 kg)       Objective:    Physical Exam  Vitals signs reviewed. Constitutional:       Appearance: She is well-developed. HENT:      Head: Normocephalic. Eyes:      Pupils: Pupils are equal, round, and reactive to light. Neck:      Musculoskeletal: Normal range of motion. Cardiovascular:      Rate and Rhythm: Normal rate and regular rhythm. Pulmonary:      Effort: Pulmonary effort is normal.      Breath sounds: Normal breath sounds. Abdominal:      Palpations: Abdomen is soft. Musculoskeletal: Normal range of motion. Skin:     General: Skin is warm. Neurological:      Mental Status: She is alert and oriented to person, place, and time. Psychiatric:         Behavior: Behavior normal.         Laura Rose was seen today for hypertension and discuss labs. Diagnoses and all orders for this visit:    Acute cystitis without hematuria  -     POCT Urinalysis no Micro  -     Culture, Urine; Future    Essential hypertension    Ureteral carcinoma, left (HCC)    Stage 3a chronic kidney disease    Mixed hyperlipidemia        Comments: diet exer hm issues l  ua today and   cx it       Cont same    Zhang if   uti sx    disucss lab  A great deal of time spent reviewing medications, diet, exercise, social issues.  Also reviewing the chart

## 2021-02-24 LAB — URINE CULTURE, ROUTINE: NORMAL

## 2021-02-25 ENCOUNTER — TELEPHONE (OUTPATIENT)
Dept: PRIMARY CARE CLINIC | Age: 86
End: 2021-02-25

## 2021-04-14 NOTE — H&P
URINE CYTOLOGY ON 4/17/18 WAS NEGATIVE   4/4/17: URINE CYTOLOGY WAS NEGATIVE   URINE CYTOLOGY ON 10/25/16 WAS NEGATIVE   URINE CULTURE ON 4/4/17 GREW ENTEROCOCCUS AND PSEUDOMONAS AND WAS TREATED WITH ANTIBIOTICS WHICH SHE FINISHED   2/22/17: COMPLETED 6 WEEKLY BCG TREATMENTS   + NOCTURIA X1   - HEMATURIA   - PAIN   LASIX RENAL SCAN 11/2/16 WAS NORMAL WITH 49% LEFT AND 51% LEFT RENAL FUNCTION   PATHOLOGY FROM LEFT URETERAL TUMOR BIOPSY ON 10/25/16 WAS POSITIVE FOR LOW GRADE PAPILLARY UROTHELIAL CARCINOMA, NON INVASIVE   CT SCAN ON 9/19/16 SHOWED LEFT HYDRONEPHROSIS AND AN ENHANCING LEFT DISTAL URETERAL TUMOR (NO METS)   BUN AND CREATININE ON 10/30/19 WERE 14 AND 0.8, RESPECTIVELY        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 The problem is on the left side. Her cancer was diagnosed 4 years ago. She has had laser ablation to treat her transitional cell carcinoma. She has not received radiation therapy. She did not receive chemotherapy for her cancer. She did not see blood in her urine. She is not having pain in new locations. She does have a good appetite. Her bowels are moving normally. She has not recently had unwanted weight loss. Her last cysto was 2 years ago. Her last radiologic test to evaluate the kidneys was 4 years ago.         ALLERGIES: Enalapril Maleate  Sulfa      MEDICATIONS: Oncovite tablet 2 tablet PO BID   Amlodipine Besilate   Losartan Potassium   Lumigan   Rhopressa   Simvastatin   Timolol Maleate   Vitamin D3        PSH: Bladder Instill AntiCA Agent - 2017, 2017, 2017, 2017, 2017, 2017  Cysto Uretero Biopsy Fulgura, Left, W/ LEFT JJ URETERAL STENT INSERTION - 2016  Cysto Uretero W/excise Tumor, Left, W/URETERAL STENT EXCHANGE - 2016  Cystoscopy Insert Stent - 10/19/2017  Cystoscopy Ureteroscopy, Left - 4/17/2018 - at [facility] [address] wheelchair, using walker, and using cane. Integumentary/Skin:  Patient denies rash, persistent itching, and skin cancer history. Neurological:  Patient denies numbness, tingling, dizziness, and altered mental status. Hematologic/Lymphatic:  Patient denies swollen glands, abnormal bleeding, and history blood transfusion. VITAL SIGNS:              Weight 145 lb / 65.77 kg      Height 63 in / 160.02 cm      Pulse 72 /min      Resp. Rate 18 /min      BMI 25.7 kg/m²      MULTI-SYSTEM PHYSICAL EXAMINATION:       Constitutional: Well-nourished. No physical deformities. Normally developed. Good grooming. Neck: Neck symmetrical, not swollen. Normal tracheal position. Respiratory: No labored breathing, no use of accessory muscles. Cardiovascular: Normal temperature, normal extremity pulses, no swelling, no varicosities. Lymphatic: No enlargement of neck, axillae, groin. Skin: No paleness, no jaundice, no cyanosis. No lesion, no ulcer, no rash. Neurologic / Psychiatric: Oriented to time, oriented to place, oriented to person. No depression, no anxiety, no agitation. Gastrointestinal: No mass, no tenderness, no rigidity, non obese abdomen. Eyes: Normal conjunctivae. Normal eyelids. Ears, Nose, Mouth, and Throat: Left ear no scars, no lesions, no masses. Right ear no scars, no lesions, no masses. Nose no scars, no lesions, no masses. Normal hearing. Normal lips. Musculoskeletal: Normal gait and station of head and neck. PAST DATA REVIEWED:     Source Of History:  Patient, Family/Caregiver   Lab Test Review:  Creatinine, Blood Urea Nitrogen (BUN)   Records Review:  Pathology Reports, Previous Patient Records   X-Ray Review: C.T. Abdomen/Pelvis: Reviewed Report.         PROCEDURES: None     ASSESSMENT:       ICD-10 Details   1 :  Malignant neoplasm of unspecified ureter - C66.9 Left, Stable   2  Urinary Tract Inf, Unspec site - N39.0 Improving PLAN:    Orders   Labs Urine Cytology   Schedule   Return Visit/Planned Activity: Follow Up After Testing   Procedure: Unspecified Date at 3 - [address] [facility]. [place]an Malone - [id number] - Cysto Uretero Biopsy Fulgura - [id number], left, RETROGRADE PYELOGRAPHY, POSSIBLE JJ URETERAL STENT INSERTION, LEFT KIDNEY WASHINGS FOR CYTOLOGY   Patient Handouts   Provided Patient Education Sheets    Paper Handout Provided PAT   Document   Letter(s):  Created for General Fredy D.O. Created for Patient: Clinical Summary   Billing Summary:  Created   The patient and I talked at length about etiologies of urinary tract infection. We discussed bacterial and viral UTIs. We discussed the possible relationship between urinary tract infection and bladder outlet obstruction, neurogenic bladder, urethral stenosis, urethral stricture, meatal stenosis, urinary tract stones, congenital urinary tract abnormalities, acquired urinary tract abnormalities, hydronephrosis, ureteral obstruction, and the development of chronic cystitis. Alternative treatment options were discussed with the patient in detail. All questions were answered    Antibiotics, anti-inflammatories, and urinary analgesics were discussed with the patient. The patient gives informed consent to proceed with conservative treatment of their urinary tract infection with urinary tract imaging as indicated. The patient was given instructions to call for abdominal pain, pelvic pain, perirectal pain, nausea, vomiting, diarrhea, fever over 100? ?F, chills, hematuria, dysuria, frequency, urgency, or urge incontinence. Notes:  She feels well today. She does not need additional antibiotics at this time. I recommended she undergo surveillance cystoscopy with left ureteroscopy and possible biopsy with fulguration as she has had done in the past. She may require additional postoperative intravesical BCG if she has recurrence of her tumor in the left distal ureter.

## 2021-04-20 NOTE — PROGRESS NOTES
Patient agreed to COVID test on 4/23 at the  [facility], between the hours of 6 am-2:30 pm located at  [address] [place] [address]. Patient instructed to bring ID. Patient instructed to self isolate until day of surgery.

## 2021-04-23 ENCOUNTER — HOSPITAL ENCOUNTER (OUTPATIENT)
Age: 86
Discharge: HOME | End: 2021-04-25
Payer: MEDICARE

## 2021-04-23 DIAGNOSIS — U07.1 COVID-19: ICD-10-CM

## 2021-04-23 PROCEDURE — U0003 INFECTIOUS AGENT DETECTION BY NUCLEIC ACID (DNA OR RNA); SEVERE ACUTE RESPIRATORY SYNDROME CORONAVIRUS 2 (SARS-COV-2) (CORONAVIRUS DISEASE [COVID-19]), AMPLIFIED PROBE TECHNIQUE, MAKING USE OF HIGH THROUGHPUT TECHNOLOGIES AS DESCRIBED BY CMS-2020-01-R: HCPCS

## 2021-04-24 LAB — SARS-COV-2, PCR: NOT DETECTED

## 2021-04-26 NOTE — PROGRESS NOTES
Rick [facility] PRE-ADMISSION TESTING GENERAL INSTRUCTIONS- [facility]-phone number:[phone]    GENERAL INSTRUCTIONS  [x] Antibacterial Soap shower Night before and/or AM of Surgery  [] Hernán wipe instruction sheet and wipes given. [x] Nothing by mouth after midnight, including gum, candy, mints, or water. [x] You may brush your teeth, gargle, but do NOT swallow water. []Hibiclens shower  the night before and the morning of surgery. Do not use             Hibiclens on your face or head. [x]No smoking, chewing tobacco, illegal drugs, or alcohol within 24 hours of your surgery. [x] Jewelry, valuables or body piercing's should not be brought to the hospital. All body and/or tongue piercing's must be removed prior to arriving to hospital.  ALL hair pins must be removed. [x] Do not wear makeup, lotions, powders, deodorant. Nail polish as directed by the nurse. [x] Arrange transportation with a responsible adult  to and from the hospital. If you do not have a responsible adult  to transport you, you will need to make arrangements with a medical transportation company (i.e. [employer]. A Uber/taxi/bus is not appropriate unless you are accompanied by a responsible adult ). Arrange for someone to be with you for the remainder of the day and for 24 hours after your procedure due to having had anesthesia. Who will be your  for transportation? daughter   Who will be staying with you for 24 hrs after your procedure? daughter  [x] Bring insurance card and photo ID.  [] Transfusion Bracelet: Please bring with you to hospital, day of surgery  [] Bring urine specimen day of surgery. Any small container is acceptable. [] Use inhalers the morning of surgery and bring with you to hospital.  [] Bring copy of living will or healthcare power of  papers to be placed in your electronic record.   [] CPAP/BI-PAP: Please bring your machine if you are to spend the night in the hospital.     PARKING INSTRUCTIONS:   [x] Arrival Time: 9541, wear mask  · [x] Parking lot '\"I\"  is located on [facility] (the corner of [facility] and [facility]). To enter, press the button and the gate will lift. A free token will be provided to exit the lot. One car per patient is allowed to park in this lot. All other cars are to park on [address] [place] [address] Street either in the parking garage or the handicap lot. [] To reach the [facility] lobby from [address] [place] [address], upon entering the hospital, take elevator B to the 3rd floor. EDUCATION INSTRUCTIONS:      [] Knee or hip replacement booklet & exercise pamphlets given. [] [place] [address] placed in chart. [] Pre-admission Testing educational folder given  [] Incentive Spirometry,coughing & deep breathing exercises reviewed. []Medication information sheet(s)   []Fluoroscopy-Xray used in surgery reviewed with patient. Educational pamphlet placed in chart. []Pain: Post-op pain is normal and to be expected. You will be asked to rate your pain from 0-10(a zero is not acceptable-education is needed). Your post-op pain goal is:  [] Ask your nurse for your pain medication. [] Joint camp offered. [] Joint replacement booklets given. [] Other:___________________________    MEDICATION INSTRUCTIONS:   [x]Bring a complete list of your medications, please write the last time you took the medicine, give this list to the nurse. [x] Take the following medications the morning of surgery with 1-2 ounces of water: see list  [x] Stop herbal supplements and vitamins 5 days before your surgery. [] DO NOT take any diabetic medicine the morning of surgery. Follow instructions for insulin the day before surgery. [] If you are diabetic and your blood sugar is low or you feel symptomatic, you may drink 1-2 ounces of apple juice or take a glucose tablet.   The morning of your procedure, you may call the pre-op area if you have concerns about your blood sugar [phone]. [] Use your inhalers the morning of surgery. Bring your emergency inhaler with you day of surgery. [] Follow physician instructions regarding any blood thinners you may be taking. WHAT TO EXPECT:  [x] The day of surgery you will be greeted and checked in by the [facility].  In addition, you will be registered in the [place] by a Patient Access Representative. Please bring your photo ID and insurance card. A nurse will greet you in accordance to the time you are needed in the pre-op area to prepare you for surgery. Please do not be discouraged if you are not greeted in the order you arrive as there are many variables that are involved in patient preparation. Your patience is greatly appreciated as you wait for your nurse. Please bring in items such as: books, magazines, newspapers, electronics, or any other items  to occupy your time in the waiting area. [x]  Delays may occur with surgery and staff will make a sincere effort to keep you informed of delays. If any delays occur with your procedure, we apologize ahead of time for your inconvenience as we recognize the value of your time. fever

## 2021-04-27 ENCOUNTER — TELEPHONE (OUTPATIENT)
Dept: PRIMARY CARE CLINIC | Age: 86
End: 2021-04-27

## 2021-04-27 NOTE — TELEPHONE ENCOUNTER
Requested Rx for walk-in shower and also Rx to evaluate and treat for walk-in shower.   Faxed to [phone]

## 2021-04-29 ENCOUNTER — HOSPITAL ENCOUNTER (OUTPATIENT)
Age: 86
Setting detail: OUTPATIENT SURGERY
Discharge: HOME | End: 2021-04-29
Attending: UROLOGY | Admitting: UROLOGY
Payer: MEDICARE

## 2021-04-29 ENCOUNTER — ANESTHESIA (OUTPATIENT)
Dept: OPERATING ROOM | Age: 86
End: 2021-04-29
Payer: MEDICARE

## 2021-04-29 ENCOUNTER — APPOINTMENT (OUTPATIENT)
Dept: GENERAL RADIOLOGY | Age: 86
End: 2021-04-29
Attending: UROLOGY
Payer: MEDICARE

## 2021-04-29 ENCOUNTER — ANESTHESIA EVENT (OUTPATIENT)
Dept: OPERATING ROOM | Age: 86
End: 2021-04-29
Payer: MEDICARE

## 2021-04-29 VITALS — SYSTOLIC BLOOD PRESSURE: 101 MMHG | OXYGEN SATURATION: 92 % | DIASTOLIC BLOOD PRESSURE: 60 MMHG

## 2021-04-29 VITALS
BODY MASS INDEX: 26.87 KG/M2 | DIASTOLIC BLOOD PRESSURE: 68 MMHG | RESPIRATION RATE: 18 BRPM | WEIGHT: 146 LBS | HEIGHT: 62 IN | SYSTOLIC BLOOD PRESSURE: 144 MMHG | TEMPERATURE: 97.6 F | OXYGEN SATURATION: 94 % | HEART RATE: 66 BPM

## 2021-04-29 DIAGNOSIS — U07.1 COVID-19: Primary | ICD-10-CM

## 2021-04-29 DIAGNOSIS — Z01.812 PRE-OPERATIVE LABORATORY EXAMINATION: ICD-10-CM

## 2021-04-29 LAB — METER GLUCOSE: 106 MG/DL (ref 74–99)

## 2021-04-29 PROCEDURE — 7100000011 HC PHASE II RECOVERY - ADDTL 15 MIN: Performed by: UROLOGY

## 2021-04-29 PROCEDURE — C1769 GUIDE WIRE: HCPCS | Performed by: UROLOGY

## 2021-04-29 PROCEDURE — 7100000001 HC PACU RECOVERY - ADDTL 15 MIN: Performed by: UROLOGY

## 2021-04-29 PROCEDURE — 7100000000 HC PACU RECOVERY - FIRST 15 MIN: Performed by: UROLOGY

## 2021-04-29 PROCEDURE — 3700000000 HC ANESTHESIA ATTENDED CARE: Performed by: UROLOGY

## 2021-04-29 PROCEDURE — 3209999900 FLUORO FOR SURGICAL PROCEDURES

## 2021-04-29 PROCEDURE — 6360000002 HC RX W HCPCS: Performed by: UROLOGY

## 2021-04-29 PROCEDURE — 2500000003 HC RX 250 WO HCPCS: Performed by: NURSE ANESTHETIST, CERTIFIED REGISTERED

## 2021-04-29 PROCEDURE — 3600000014 HC SURGERY LEVEL 4 ADDTL 15MIN: Performed by: UROLOGY

## 2021-04-29 PROCEDURE — 2720000010 HC SURG SUPPLY STERILE: Performed by: UROLOGY

## 2021-04-29 PROCEDURE — 3600000004 HC SURGERY LEVEL 4 BASE: Performed by: UROLOGY

## 2021-04-29 PROCEDURE — 88112 CYTOPATH CELL ENHANCE TECH: CPT

## 2021-04-29 PROCEDURE — 6360000002 HC RX W HCPCS: Performed by: NURSE ANESTHETIST, CERTIFIED REGISTERED

## 2021-04-29 PROCEDURE — 2580000003 HC RX 258: Performed by: UROLOGY

## 2021-04-29 PROCEDURE — 3700000001 HC ADD 15 MINUTES (ANESTHESIA): Performed by: UROLOGY

## 2021-04-29 PROCEDURE — 7100000010 HC PHASE II RECOVERY - FIRST 15 MIN: Performed by: UROLOGY

## 2021-04-29 PROCEDURE — 2709999900 HC NON-CHARGEABLE SUPPLY: Performed by: UROLOGY

## 2021-04-29 PROCEDURE — 82962 GLUCOSE BLOOD TEST: CPT

## 2021-04-29 PROCEDURE — C2617 STENT, NON-COR, TEM W/O DEL: HCPCS | Performed by: UROLOGY

## 2021-04-29 DEVICE — IMPLANTABLE DEVICE: Type: IMPLANTABLE DEVICE | Site: URETER | Status: FUNCTIONAL

## 2021-04-29 RX ORDER — IBUPROFEN 400 MG/1
400 TABLET ORAL EVERY 8 HOURS PRN
Status: DISCONTINUED | OUTPATIENT
Start: 2021-04-29 | End: 2021-04-29 | Stop reason: HOSPADM

## 2021-04-29 RX ORDER — MORPHINE SULFATE 2 MG/ML
2 INJECTION, SOLUTION INTRAMUSCULAR; INTRAVENOUS EVERY 5 MIN PRN
Status: DISCONTINUED | OUTPATIENT
Start: 2021-04-29 | End: 2021-04-29 | Stop reason: HOSPADM

## 2021-04-29 RX ORDER — MEPERIDINE HYDROCHLORIDE 25 MG/ML
12.5 INJECTION INTRAMUSCULAR; INTRAVENOUS; SUBCUTANEOUS EVERY 5 MIN PRN
Status: DISCONTINUED | OUTPATIENT
Start: 2021-04-29 | End: 2021-04-29 | Stop reason: HOSPADM

## 2021-04-29 RX ORDER — PROMETHAZINE HYDROCHLORIDE 25 MG/ML
6.25 INJECTION, SOLUTION INTRAMUSCULAR; INTRAVENOUS
Status: DISCONTINUED | OUTPATIENT
Start: 2021-04-29 | End: 2021-04-29 | Stop reason: HOSPADM

## 2021-04-29 RX ORDER — KETAMINE HYDROCHLORIDE 10 MG/ML
INJECTION, SOLUTION INTRAMUSCULAR; INTRAVENOUS PRN
Status: DISCONTINUED | OUTPATIENT
Start: 2021-04-29 | End: 2021-04-29 | Stop reason: SDUPTHER

## 2021-04-29 RX ORDER — HYDRALAZINE HYDROCHLORIDE 20 MG/ML
5 INJECTION INTRAMUSCULAR; INTRAVENOUS EVERY 10 MIN PRN
Status: DISCONTINUED | OUTPATIENT
Start: 2021-04-29 | End: 2021-04-29 | Stop reason: HOSPADM

## 2021-04-29 RX ORDER — PHENAZOPYRIDINE HYDROCHLORIDE 100 MG/1
100 TABLET, FILM COATED ORAL 3 TIMES DAILY PRN
Status: DISCONTINUED | OUTPATIENT
Start: 2021-04-29 | End: 2021-04-29 | Stop reason: HOSPADM

## 2021-04-29 RX ORDER — SODIUM CHLORIDE 9 MG/ML
INJECTION, SOLUTION INTRAVENOUS CONTINUOUS
Status: DISCONTINUED | OUTPATIENT
Start: 2021-04-29 | End: 2021-04-29 | Stop reason: HOSPADM

## 2021-04-29 RX ORDER — PROPOFOL 10 MG/ML
INJECTION, EMULSION INTRAVENOUS CONTINUOUS PRN
Status: DISCONTINUED | OUTPATIENT
Start: 2021-04-29 | End: 2021-04-29 | Stop reason: SDUPTHER

## 2021-04-29 RX ORDER — IBUPROFEN 400 MG/1
400 TABLET ORAL EVERY 8 HOURS PRN
Qty: 12 TABLET | Refills: 0 | Status: SHIPPED | OUTPATIENT
Start: 2021-04-29 | End: 2021-01-01

## 2021-04-29 RX ORDER — PROPOFOL 10 MG/ML
INJECTION, EMULSION INTRAVENOUS PRN
Status: DISCONTINUED | OUTPATIENT
Start: 2021-04-29 | End: 2021-04-29 | Stop reason: SDUPTHER

## 2021-04-29 RX ORDER — CIPROFLOXACIN 2 MG/ML
200 INJECTION, SOLUTION INTRAVENOUS
Status: COMPLETED | OUTPATIENT
Start: 2021-04-29 | End: 2021-04-29

## 2021-04-29 RX ORDER — PHENAZOPYRIDINE HYDROCHLORIDE 100 MG/1
100 TABLET, FILM COATED ORAL 3 TIMES DAILY PRN
Qty: 20 TABLET | Refills: 0 | Status: SHIPPED | OUTPATIENT
Start: 2021-04-29 | End: 2021-05-06

## 2021-04-29 RX ORDER — ONDANSETRON 2 MG/ML
4 INJECTION INTRAMUSCULAR; INTRAVENOUS EVERY 6 HOURS PRN
Status: DISCONTINUED | OUTPATIENT
Start: 2021-04-29 | End: 2021-04-29 | Stop reason: HOSPADM

## 2021-04-29 RX ORDER — DEXAMETHASONE SODIUM PHOSPHATE 10 MG/ML
INJECTION, SOLUTION INTRAMUSCULAR; INTRAVENOUS PRN
Status: DISCONTINUED | OUTPATIENT
Start: 2021-04-29 | End: 2021-04-29 | Stop reason: SDUPTHER

## 2021-04-29 RX ORDER — LABETALOL HYDROCHLORIDE 5 MG/ML
5 INJECTION, SOLUTION INTRAVENOUS EVERY 10 MIN PRN
Status: DISCONTINUED | OUTPATIENT
Start: 2021-04-29 | End: 2021-04-29 | Stop reason: HOSPADM

## 2021-04-29 RX ORDER — ONDANSETRON 2 MG/ML
INJECTION INTRAMUSCULAR; INTRAVENOUS PRN
Status: DISCONTINUED | OUTPATIENT
Start: 2021-04-29 | End: 2021-04-29 | Stop reason: SDUPTHER

## 2021-04-29 RX ADMIN — SODIUM CHLORIDE: 9 INJECTION, SOLUTION INTRAVENOUS at 07:20

## 2021-04-29 RX ADMIN — PROPOFOL 125 MCG/KG/MIN: 10 INJECTION, EMULSION INTRAVENOUS at 07:36

## 2021-04-29 RX ADMIN — ONDANSETRON HYDROCHLORIDE 4 MG: 2 INJECTION, SOLUTION INTRAMUSCULAR; INTRAVENOUS at 07:36

## 2021-04-29 RX ADMIN — DEXAMETHASONE SODIUM PHOSPHATE 10 MG: 10 INJECTION, SOLUTION INTRAMUSCULAR; INTRAVENOUS at 07:36

## 2021-04-29 RX ADMIN — CIPROFLOXACIN 200 MG: 2 INJECTION, SOLUTION INTRAVENOUS at 07:20

## 2021-04-29 RX ADMIN — PROPOFOL 50 MG: 10 INJECTION, EMULSION INTRAVENOUS at 07:36

## 2021-04-29 RX ADMIN — KETAMINE HYDROCHLORIDE 20 MG: 10 INJECTION, SOLUTION INTRAMUSCULAR; INTRAVENOUS at 07:50

## 2021-04-29 ASSESSMENT — PULMONARY FUNCTION TESTS
PIF_VALUE: 0
PIF_VALUE: 0
PIF_VALUE: 1
PIF_VALUE: 0
PIF_VALUE: 1
PIF_VALUE: 0
PIF_VALUE: 1
PIF_VALUE: 0
PIF_VALUE: 1
PIF_VALUE: 0
PIF_VALUE: 1
PIF_VALUE: 0
PIF_VALUE: 1
PIF_VALUE: 2
PIF_VALUE: 1
PIF_VALUE: 1
PIF_VALUE: 0
PIF_VALUE: 1
PIF_VALUE: 1
PIF_VALUE: 0
PIF_VALUE: 0
PIF_VALUE: 1

## 2021-04-29 ASSESSMENT — PAIN SCALES - GENERAL
PAINLEVEL_OUTOF10: 0

## 2021-04-29 NOTE — H&P
URINE CYTOLOGY ON 4/17/18 WAS NEGATIVE   4/4/17: URINE CYTOLOGY WAS NEGATIVE   URINE CYTOLOGY ON 10/25/16 WAS NEGATIVE   URINE CULTURE ON 4/4/17 GREW ENTEROCOCCUS AND PSEUDOMONAS AND WAS TREATED WITH ANTIBIOTICS WHICH SHE FINISHED   2/22/17: COMPLETED 6 WEEKLY BCG TREATMENTS   + NOCTURIA X1   - HEMATURIA   - PAIN   LASIX RENAL SCAN 11/2/16 WAS NORMAL WITH 49% LEFT AND 51% LEFT RENAL FUNCTION   PATHOLOGY FROM LEFT URETERAL TUMOR BIOPSY ON 10/25/16 WAS POSITIVE FOR LOW GRADE PAPILLARY UROTHELIAL CARCINOMA, NON INVASIVE   CT SCAN ON 9/19/16 SHOWED LEFT HYDRONEPHROSIS AND AN ENHANCING LEFT DISTAL URETERAL TUMOR (NO METS)   BUN AND CREATININE ON 10/30/19 WERE 14 AND 0.8, RESPECTIVELY         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 The problem is on the left side. Her cancer was diagnosed 4 years ago. She has had laser ablation to treat her transitional cell carcinoma. She has not received radiation therapy. She did not receive chemotherapy for her cancer. She did not see blood in her urine. She is not having pain in new locations. She does have a good appetite. Her bowels are moving normally. She has not recently had unwanted weight loss. Her last cysto was 2 years ago.  Her last radiologic test to evaluate the kidneys was 4 years ago.         ALLERGIES: Enalapril Maleate  Sulfa        MEDICATIONS: Oncovite tablet 2 tablet PO BID   Amlodipine Besilate   Losartan Potassium   Lumigan   Rhopressa   Simvastatin   Timolol Maleate   Vitamin D3          PSH: Bladder Instill AntiCA Agent - 2017, 2017, 2017, 2017, 2017, 2017  Cysto Uretero Biopsy Fulgura, Left, W/ LEFT JJ URETERAL STENT INSERTION - 2016  Cysto Uretero W/excise Tumor, Left, W/URETERAL STENT EXCHANGE - 2016  Cystoscopy Insert Stent - 10/19/2017  Cystoscopy Ureteroscopy, Left - 4/17/2018 - at [address] denies using wheelchair, using walker, and using cane. Integumentary/Skin:  Patient denies rash, persistent itching, and skin cancer history. Neurological:  Patient denies numbness, tingling, dizziness, and altered mental status. Hematologic/Lymphatic:  Patient denies swollen glands, abnormal bleeding, and history blood transfusion. VITAL SIGNS:                   Weight 145 lb / 65.77 kg      Height 63 in / 160.02 cm      Pulse 72 /min      Resp. Rate 18 /min      BMI 25.7 kg/m²      MULTI-SYSTEM PHYSICAL EXAMINATION:        Constitutional: Well-nourished. No physical deformities. Normally developed. Good grooming. Neck: Neck symmetrical, not swollen. Normal tracheal position. Respiratory: No labored breathing, no use of accessory muscles. Cardiovascular: Normal temperature, normal extremity pulses, no swelling, no varicosities. Lymphatic: No enlargement of neck, axillae, groin. Skin: No paleness, no jaundice, no cyanosis. No lesion, no ulcer, no rash. Neurologic / Psychiatric: Oriented to time, oriented to place, oriented to person. No depression, no anxiety, no agitation. Gastrointestinal: No mass, no tenderness, no rigidity, non obese abdomen. Eyes: Normal conjunctivae. Normal eyelids. Ears, Nose, Mouth, and Throat: Left ear no scars, no lesions, no masses. Right ear no scars, no lesions, no masses. Nose no scars, no lesions, no masses. Normal hearing. Normal lips. Musculoskeletal: Normal gait and station of head and neck.                  PAST DATA REVIEWED:      Source Of History:  Patient, Family/Caregiver   Lab Test Review:  Creatinine, Blood Urea Nitrogen (BUN)   Records Review:  Pathology Reports, Previous Patient Records   X-Ray Review: C.T.  Abdomen/Pelvis: Reviewed Report.          PROCEDURES: None      ASSESSMENT:          ICD-10 Details   1 :  Malignant neoplasm of unspecified ureter - C66.9 Left, Stable   2   Urinary Tract Inf, Unspec site - N39.0 Improving      PLAN:     Orders   Labs Urine Cytology   Schedule   Return Visit/Planned Activity: Follow Up After Testing   Procedure: Unspecified Date at 3 - [address] [facility]. [place] - [id number] - Cysto Uretero Biopsy Fulgura - 23264, left, RETROGRADE PYELOGRAPHY, POSSIBLE JJ URETERAL STENT INSERTION, LEFT KIDNEY WASHINGS FOR CYTOLOGY   Patient Handouts       Provided Patient Education Sheets     Paper Handout Provided PAT   Document   Letter(s):  Created for [facility] LIZ SARAH     Created for Patient: Clinical Summary   Billing Summary:  Created   The patient and I talked at length about etiologies of urinary tract infection. We discussed bacterial and viral UTIs. We discussed the possible relationship between urinary tract infection and bladder outlet obstruction, neurogenic bladder, urethral stenosis, urethral stricture, meatal stenosis, urinary tract stones, congenital urinary tract abnormalities, acquired urinary tract abnormalities, hydronephrosis, ureteral obstruction, and the development of chronic cystitis. Alternative treatment options were discussed with the patient in detail. All questions were answered    Antibiotics, anti-inflammatories, and urinary analgesics were discussed with the patient. The patient gives informed consent to proceed with conservative treatment of their urinary tract infection with urinary tract imaging as indicated. The patient was given instructions to call for abdominal pain, pelvic pain, perirectal pain, nausea, vomiting, diarrhea, fever over 100? ?F, chills, hematuria, dysuria, frequency, urgency, or urge incontinence.        Notes:  She feels well today. She does not need additional antibiotics at this time.  I recommended she undergo surveillance cystoscopy with left ureteroscopy and possible biopsy with fulguration as she has had done in the past. She may require additional postoperative intravesical BCG if she has recurrence of her tumor in the

## 2021-04-29 NOTE — PROGRESS NOTES
Employee pharmacy to deliver covered medication (ibuprofen) to [employer]. Family requesting script to transfer to [facility]. Pharmacy to call [facility] to transfer pyridium script. Patient/family updated.

## 2021-04-29 NOTE — OP NOTE
[address] [place] [address].  Urology Post-operative Note    Etelvina Wills  YOB: 1928  [id number]    Pre-operative Diagnosis: Left hydronephrosis, history of bladder cancer, recurrent left ureteral tumor    Post-operative Diagnosis:  Same    Procedure: Cystourethroscopy, bladder washings for cytology, left semirigid ureteroscopy with intracorporeal laser ablation of recurrent ureteral tumor with fulguration, left JJ ureteral stent exchange    Surgeon: Robert Arshad MD    Anesthesia:   [facility]    Estimated Blood Loss:   Minimal    Complications: None    Drains: Left 8 Togolese by 26 cm JJ ureteral stent    Specimen(s): Bladder washings for cytology    Clinical Note:   19-year-old female with recurrent left ureteral tumor managed with endoscopic tumor ablation every 4 months roughly. She last underwent the procedure on 1/5/2021. She presents for the above listed procedure. The risks and benefits of the procedure including but not limited to infection, bleeding, bladder perforation or injury, possible need for chronic stent with stent irritation etc. were discussed in detail and she elected to proceed    Operative Description:   She was taken to the operating room placed on the OR table in the supine position. She received IV Cipro preoperatively. Following induction of anesthesia she was repositioned into the dorsolithotomy position. Her external genitalia and abdomen were then prepped and draped sterilely. A  film KUB showed a well-positioned left ureteral stent. No stones were identified. A 21 Togolese cystoscope with 30 degree lens was inserted per urethra and into the bladder. There were no urethral strictures false passages or tumors. Panendoscopic evaluation of the bladder showed diffuse cystitis but no obvious stones clots or tumors. Both ureteral orifice ease were in their usual position on the trigone.   There was a stent coming out the left side with moderate stone

## 2021-04-29 NOTE — ANESTHESIA PRE PROCEDURE
Department of Anesthesiology  Preprocedure Note       Name:  Maria Luisa Dey   Age:  80 y.o.  :  3/20/1928                                          MRN:  [id number]         Date:  2021      Surgeon: Salud Lemus):  Jerrod Au MD    Procedure: Procedure(s):  CYSTOURETHROSCOPY, BLADDER WASHINGS FOR CYTOLOGY,  BLADDER BIOPSY WITH FULGURATION, LEFT SEMI RIGID URETEROSCOPY WITH INTRACORPOREAL LASER ABLATION OF RECURRENT LEFT URETERAL TUMOR AND LEFT STENT CHANGE    Medications prior to admission:   Prior to Admission medications    Medication Sig Start Date End Date Taking? Authorizing Provider   nitrofurantoin, macrocrystal-monohydrate, (MACROBID) 100 MG capsule Take 1 capsule by mouth 2 times daily 21   Ganga Lujan DO   ibuprofen (ADVIL;MOTRIN) 600 MG tablet Take 1 tablet by mouth every 8 hours as needed for Pain 21   Priti Sheikh MD   amLODIPine (NORVASC) 2.5 MG tablet Take 1 tablet by mouth every morning 20   Ganga Lujan DO   losartan (COZAAR) 100 MG tablet Take 1 tablet by mouth daily 20   Ganga Lujan DO   simvastatin (ZOCOR) 20 MG tablet Take 1 tablet by mouth nightly 20   Ganga Lujan DO   citalopram (CELEXA) 10 MG tablet Take 1 tablet by mouth every morning 20   Ganga Lujan DO   ammonium lactate (LAC-HYDRIN) 12 % lotion Apply topically daily.  4/3/20   Ganga Lujan DO   Multiple Vitamins-Minerals (ONCOVITE PO) Take by mouth daily    Historical Provider, MD   Cholecalciferol (VITAMIN D3) 50 MCG (2000) CAPS Take 1 capsule by mouth daily 19   Ganga Lujan DO   bimatoprost (LUMIGAN) 0.01 % SOLN ophthalmic drops Place 1 drop into both eyes nightly    Historical Provider, MD   dorzolamide-timolol (COSOPT) 22.3-6.8 MG/ML ophthalmic solution Place 1 drop into both eyes 2 times daily    Historical Provider, MD   RHOPRESSA 0.02 % SOLN Apply 1 drop to eye daily Indications: administer one drop in both eyes every morning 10/30/19   Historical Provider, MD       Current medications:    No current facility-administered medications for this visit. No current outpatient medications on file. Facility-Administered Medications Ordered in Other Visits   Medication Dose Route Frequency Provider Last Rate Last Admin    0.9 % sodium chloride infusion   Intravenous Continuous Colton Shekih  mL/hr at 04/29/21 0720 New Bag at 04/29/21 0720    ciprofloxacin (CIPRO) IVPB 200 mg  200 mg Intravenous On Call to [address],  mL/hr at 04/29/21 0720 200 mg at 04/29/21 0720       Allergies: Allergies   Allergen Reactions    Enalapril Maleate Other (See Comments)     Other reaction(s): cough/rash    Penicillins      ?  Sulfa Antibiotics      ?        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    Ureteral tumor D49.59    Abdominal pain R10.9    Hydronephrosis N13.30    Hydroureter N13.4    Lesion of lumbar spine M89.9    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 ([address] C79.49       Past Medical History:        Diagnosis Date    Abdominal pain 1/29/2015    Acute cholecystitis 10/29/2019    Diet-controlled diabetes mellitus ([address] 8/20/2019    no med    Gastritis 1/30/2015    Glaucoma     Hematemesis 1/29/2015    [place] (hard of hearing)     Hyperlipidemia     Hypertension     PONV (postoperative nausea and vomiting)        Past Surgical History: Procedure Laterality Date    CT [facility] NEW ACCESS  8/27/2020    CT [facility] NEW ACCESS 8/27/2020 Christofer Rogers MD [facility] CT    CYSTOSCOPY  04/04/2017    cysto retro left ureteroscopy, stent removal with Dr. Dominic Monson  07/18/2017    stent placement    CYSTOSCOPY  10/19/2017    Left stent placement    CYSTOSCOPY N/A 9/22/2020    CYSTO URETHROSCOPY, RETROGRADE PYELOGRAM, BLADDER TUMOR RESECTION AND FULGURATION, POSSIBLE LEFT URETEROSCOPY AND LASER TUMOR ABLATION,  POSSIBLE LEFT STENT INSERTION performed by Shyann Elliott MD at [address] Bilateral 1/5/2021    CYSTOSCOPY, BILATERAL RETROGRADE PYELOGRAM,WITH BIOPSY,  LEFT URETEROSCOPY, LASER ABLATION OF URETERAL TUMOR WITH LEFT STENT CHANGE performed by Shyann Elliott MD at [facility] Left 8/20/2020    CYSTOSCOPY, TRANSURETHRAL RESECTION BLADDER TUMOR performed by Shyann Elliott MD at [facility] 4/17/2018    CYSTOSCOPY RETROGRADE PYELOGRAM BLADDER AND LEFT URETERAL WASHINGS INSERTION LEFT URETERAL STENT LEFT URETEROSCOPY performed by Shyann Elliott MD at [place] [address]  1/29/15       Social History:    Social History     Tobacco Use    Smoking status: Never Smoker    Smokeless tobacco: Never Used   Substance Use Topics    Alcohol use: Never     Frequency: Never                                Counseling given: Not Answered      Vital Signs (Current): There were no vitals filed for this visit. BP Readings from Last 3 Encounters:   04/29/21 (!) 182/80   02/22/21 128/78   01/05/21 (!) 102/58       NPO Status:                                                                                  ECHO: 10/30/2019   Summary   Left ventricular size is grossly normal.   Mild left ventricular concentric hypertrophy noted.    No regional wall motion abnormalities seen. Ejection fraction is visually estimated at 60%. Moderate aortic stenosis is present. EK2020  Sinus rhythm with occasional premature ventricular complexes and premature atrial complexes  Left axis deviation  Abnormal ECG  No previous ECGs available    CT: 2020  1. Redemonstration of mass associated with anterior aspect of the   urinary bladder as well as nodular thickening involving posterior   inferior wall of urinary bladder at origin of left ureter. These   findings are likely related to known urinary bladder neoplasm.       2. Stable left hydroureter and left hydronephrosis.       3. New gallbladder wall thickening with surrounding inflammatory   changes and multiple calcified gallstones. Findings could suggest   acute cholecystitis.       4. Stable small free fluid is located within the pelvis.       5. Inflammatory changes surrounding the urinary bladder suggestive of   cystitis.       6. Diverticulosis without evidence of acute diverticulitis.       7. Redemonstration of lytic lesions involving L2 and T11 vertebral   bodies.       8. Stable compression fracture involving L1. CT: 2020      1. New mild posterior bibasilar atelectasis right more than left.       2. Worsening of left hydronephrosis, and left hydroureter, now severe.       3. Abnormal soft tissue density filling the dilated distal ureter   probably 6 cm down to the ureterovesical junction. Specious for some   thickening of the left bladder wall. 4. Large hematoma in the urinary bladder adjacent to an extra large   Webb balloon. Also right lateral pelvic extraperitoneal hematoma. 5. Constipation and diverticulosis without diverticulitis. 6. Chronic cholelithiasis without cholecystitis.    7. Increased size of a chronic right renal cyst.   8. No renal or ureter calculus.             BMI:   Wt Readings from Last 3 Encounters:   21 146 lb (66.2 kg)   21 146 lb (66.2 kg)   21 140 lb

## 2021-04-29 NOTE — PROGRESS NOTES
CLINICAL PHARMACY NOTE: MEDS TO [address] Select Patient?: Yes  Total # of Prescriptions Filled: 1   The following medications were delivered to the patient:  · Ibuprofen 400mg  Total # of Interventions Completed: 3  Time Spent (min): 30    Additional Documentation:    Delivered to patient and transferring phenazopyridine 100mg to [facility] on [address]

## 2021-06-14 NOTE — CARE COORDINATION
-ACM attempted to reach patient to offer enrollment into Care Coordination program, however no answer. -HIPAA compliant VM left introducing self, reason for call, and brief explanation of program.  -Left ACM's contact information, requesting call back. If no return call, [facility] will attempt outreach again.  -Will send introduction letter to patient via [address]

## 2021-06-14 NOTE — LETTER
6/14/2021    [address] [place] [address]      Dear Pamela Galevz,    My name is Debbie Kern and I am an Ambulatory Care Manager who partners with Dr. Jesusita Hernandez to improve patients' health. I've been trying to reach you via phone to let you know that, as a member of your care team, I will work with other providers involved in your care, offer education for your specific health conditions, and connect you with more resources as needed. This program is designed to provide you with the opportunity to have a ([address] [facility]/[address]) care manager partner with you for the following situations:     1) if you come home from the hospital or emergency room   2) to help manage your disease   3) when you would like assistance coordinating services or appointments    This added support is provided at no additional cost to you. My primary focus is to help you achieve specific goals and improve your health. Please call me at [phone] to further discuss how I can support your health care needs.     Sincerely,    Kris Lyons RN

## 2021-06-21 NOTE — CARE COORDINATION
-[facility]'s second attempt to reach patient  to offer enrollment into Care Coordination program after introductory letter opened via [employer] last week, however no answer. -HIPAA VM left introducing self, reason for call, and brief explanation of program.  -Left [facility]'s contact information, requesting call back. If no return call will attempt outreach again.

## 2021-06-25 NOTE — TELEPHONE ENCOUNTER
The pt usually gets otc Multiple Vitamins-Minerals (ONCOVITE PO)    from [facility].  The pharmacist is calling because they can no longer get this particular vitamin, so the pt asked them to reach out to us and see if there is anything otc or prescribed that is comparable to it

## 2021-06-30 NOTE — CARE COORDINATION
-ACM called patient to offer enrollment into Care Coordination program, however her daughter Binta Clemons answered the phone. .  -Introduced self, reason for call, and explanation of program.   -Binta Clemons declined at this time.  -Binta Clemons reports Pt has a family member with her 24/7.    -Ena reports patient qualified for Passport services through [facility] but the family declined [facility] aides. Binta Clemons does all her ADL's.   -Binta Clemons reports Pt has a living will, POA and a life alert button.   -Binta Clemons said she would call ACM if any new needs or questions arise.  Binta Clemons has ACM contact information.   -Will remove name from care team. L leg shortened, palpable deformity to L hip anteriorly, no tenderness to bony pelvis. sensation intact, 2+DP pulse.  RLE with FROM and strength.  no other deformities or injuries

## 2021-08-23 NOTE — PROGRESS NOTES
21  Name: Carlo Crews    : 3/20/1928    Sex: female    Age: 80 y.o. Subjective:  Chief Complaint: Patient is here for 6  Mo  Ck   Re     bp chol arhtritis  Ca  Bladder        Feel ok  No  Cp or sob   Here with daughter      Lab with  crea    Stable         ua noted  Had scope with  Bladder and   urol happy with it      Review of Systems   Constitutional: Negative. HENT: Negative. Eyes: Negative. Respiratory: Negative. Cardiovascular: Negative. Gastrointestinal: Negative. Endocrine: Negative. Genitourinary: Negative. Musculoskeletal: Positive for arthralgias. Skin: Negative. Allergic/Immunologic: Negative. Neurological: Negative. Hematological: Negative. Psychiatric/Behavioral: Negative. Current Outpatient Medications:     citalopram (CELEXA) 10 MG tablet, Take 1 tablet by mouth every morning, Disp: 90 tablet, Rfl: 3    amLODIPine (NORVASC) 2.5 MG tablet, Take 1 tablet by mouth every morning, Disp: 90 tablet, Rfl: 3    losartan (COZAAR) 100 MG tablet, Take 1 tablet by mouth daily, Disp: 90 tablet, Rfl: 3    simvastatin (ZOCOR) 20 MG tablet, Take 1 tablet by mouth nightly, Disp: 90 tablet, Rfl: 3    ammonium lactate (LAC-HYDRIN) 12 % lotion, Apply topically daily. , Disp: 1 Bottle, Rfl: 5    Multiple Vitamins-Minerals (ONCOVITE PO), Take by mouth daily, Disp: , Rfl:     Cholecalciferol (VITAMIN D3) 50 MCG (2000) CAPS, Take 1 capsule by mouth daily, Disp: 90 capsule, Rfl: 3    bimatoprost (LUMIGAN) 0.01 % SOLN ophthalmic drops, Place 1 drop into both eyes nightly, Disp: , Rfl:     dorzolamide-timolol (COSOPT) 22.3-6.8 MG/ML ophthalmic solution, Place 1 drop into both eyes 2 times daily, Disp: , Rfl:     RHOPRESSA 0.02 % SOLN, Apply 1 drop to eye daily Indications: administer one drop in both eyes every morning, Disp: , Rfl:   Allergies   Allergen Reactions    Enalapril Maleate Other (See Comments)     Other reaction(s): cough/rash    Penicillins      ?  Sulfa Antibiotics      ? Social History     Socioeconomic History    Marital status:      Spouse name: Not on file    Number of children: Not on file    Years of education: Not on file    Highest education level: Not on file   Occupational History    Not on file   Tobacco Use    Smoking status: Never Smoker    Smokeless tobacco: Never Used   Vaping Use    Vaping Use: Never used   Substance and Sexual Activity    Alcohol use: Never    Drug use: Never    Sexual activity: Not Currently   Other Topics Concern    Not on file   Social History Narrative        Stress Test - 09 neg. ([place])    Duplex Carotid Ultasound - 09 no evidence of significant stenosis    HTN    LIPID    GLAUCOMA    C HYSTER    CVA--    OA L KNEE    DIET DM    SON SHAHZAD HINES AND WILL LIVE WITH HER    Pam Luis  1928 Page #2    LOW VIT B12 AND VIT D 4-10    DAUGHTER WORKS AT DR [employer]EIR[employer] OFFICE    GYN DR Guo Plunk    US KIDNEY WITH RIGHT RENAL CYST AND SEVERAL RIGHT RENAL STONES    ADMIT WITH GI BLEED----AND EGD WITH ESOPHAGITIS AND POSS MASS BUT BX NEG---    2-15----DR HORN------RE DONE DR WALLER 5-15    ER WITH FALL WITH 20 % COMP FX L-1 AND CT ABD IWTH LEFT HYDRONEPHROSIS AND    URETER WITH NO STONE------GB STONE OR SLUDGE NOTED    CT  WITH DISTAL LEFT URETER MASS---DX WITH URETER CA---- LEFT----DR MAGGI FITZPATRICK-----STENT    SON DX WITH BLADDER CA     Admit  10-19 with GB sx and new cary murmur   Seen by  Gwen panchal and echo with mod AR     Social Determinants of Health     Financial Resource Strain:     Difficulty of Paying Living Expenses:    Food Insecurity:     Worried About Running Out of Food in the Last Year:     Ran Out of Food in the Last Year:    Transportation Needs:     Lack of Transportation (Medical):      Lack of Transportation (Non-Medical):    Physical Activity:     Days of Exercise per Week:     Minutes of Exercise per Session: Stress:     Feeling of Stress :    Social Connections:     Frequency of Communication with Friends and Family:     Frequency of Social Gatherings with Friends and Family:     Attends Hoahaoism Services:     Active Member of Clubs or Organizations:     Attends Club or Organization Meetings:     Marital Status:    Intimate Partner Violence:     Fear of Current or Ex-Partner:     Emotionally Abused:     Physically Abused:     Sexually Abused:       Past Medical History:   Diagnosis Date    Abdominal pain 1/29/2015    Acute cholecystitis 10/29/2019    Diet-controlled diabetes mellitus ([address] 8/20/2019    no med    Gastritis 1/30/2015    Glaucoma     Hematemesis 1/29/2015    [place] (hard of hearing)     Hyperlipidemia     Hypertension     PONV (postoperative nausea and vomiting)      Family History   Problem Relation Age of Onset    Early Death Mother     Prostate Cancer Father     Kidney Cancer Sister     Cancer Brother     Alzheimer's Disease Brother     Cancer Sister       Past Surgical History:   Procedure Laterality Date    CT [facility] NEW ACCESS  8/27/2020    CT [facility] NEW ACCESS 8/27/2020 Arianna Reddy MD [facility] CT    CYSTOSCOPY  04/04/2017    cysto retro left ureteroscopy, stent removal with Dr. Grady Landers  07/18/2017    stent placement    CYSTOSCOPY  10/19/2017    Left stent placement    CYSTOSCOPY N/A 9/22/2020    CYSTO URETHROSCOPY, RETROGRADE PYELOGRAM, BLADDER TUMOR RESECTION AND FULGURATION, POSSIBLE LEFT URETEROSCOPY AND LASER TUMOR ABLATION,  POSSIBLE LEFT STENT INSERTION performed by Jeremiah Raymond MD at [address] [place] [address] Bilateral 1/5/2021    CYSTOSCOPY, BILATERAL RETROGRADE PYELOGRAM,WITH BIOPSY,  LEFT URETEROSCOPY, LASER ABLATION OF URETERAL TUMOR WITH LEFT STENT CHANGE performed by Jeremiah Raymond MD at [address] [place] [address] / [facility] / Alessia Rodgers Left 8/20/2020    CYSTOSCOPY, TRANSURETHRAL RESECTION BLADDER TUMOR performed by Aureliano Lawson MD [facility] at [address] [place] [address] LITHOTRIPSY Left 4/29/2021    CYSTOURETHROSCOPY, BLADDER WASHINGS FOR CYTOLOGY, , LEFT SEMI RIGID URETEROSCOPY WITH INTRACORPOREAL LASER ABLATION OF RECURRENT LEFT URETERAL TUMOR AND LEFT STENT CHANGE performed by Jeremiah Raymond MD at [address] Left 4/17/2018    CYSTOSCOPY RETROGRADE PYELOGRAM BLADDER AND LEFT URETERAL WASHINGS INSERTION LEFT URETERAL STENT LEFT URETEROSCOPY performed by Jeremiah Raymond MD at [address]  1/29/15      Vitals:    08/23/21 1401   Pulse: 97   Resp: 18   Temp: 96.5 °F (35.8 °C)   SpO2: 96%   Weight: 151 lb 12.8 oz (68.9 kg)       Objective:    Physical Exam  Vitals reviewed. Constitutional:       Appearance: She is well-developed. HENT:      Head: Normocephalic. Eyes:      Pupils: Pupils are equal, round, and reactive to light. Cardiovascular:      Rate and Rhythm: Normal rate and regular rhythm. Pulmonary:      Effort: Pulmonary effort is normal.      Breath sounds: Normal breath sounds. Abdominal:      Palpations: Abdomen is soft. Musculoskeletal:         General: Normal range of motion. Cervical back: Normal range of motion. Skin:     General: Skin is warm. Neurological:      Mental Status: She is alert and oriented to person, place, and time. Psychiatric:         Behavior: Behavior normal.         Chris Valdez was seen today for hypertension. 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 Low-salt low caffeine diet. Call if systolic blood pressure is above 762 and diastolic blood pressures above 85.   Only use a upper arm digital cuff.      Aggressive low-fat diet. Avoid red meats, greasy fried foods, dairy products. Avoid processed foods. Take cholesterol medications without food. Check blood sugars 4 times a day. Aggressive low, sugar low carbohydrate diet. Call if blood sugar less than 70 or over 200. Avoid eating in between meals. Lose weight. Exercise. A great deal of time spent reviewing medications, diet, exercise, social issues. Also reviewing the chart before entering the room with patient and finishing charting after leaving patient's room. More than half of that time was spent face to face with the patient in counseling and coordinating care. Follow Up: Return in about 6 months (around 2/23/2022) for Lab Before.      Seen by:  Jessica Ashford, DO

## 2021-08-31 NOTE — H&P
NO ISSUES AT THIS TIME   HER DAUGHTER AND SON ARE WITH HER TODAY   S/P URETEROSCOPY WAS ON 4/17/18 WHICH SHOWED NO RECURRENT DISEASE   URINE CYTOLOGY ON 4/17/18 WAS NEGATIVE   4/4/17: URINE CYTOLOGY WAS NEGATIVE   URINE CYTOLOGY ON 10/25/16 WAS NEGATIVE   URINE CULTURE ON 4/4/17 GREW ENTEROCOCCUS AND PSEUDOMONAS AND WAS TREATED WITH ANTIBIOTICS WHICH SHE FINISHED   2/22/17: COMPLETED 6 WEEKLY BCG TREATMENTS   + NOCTURIA X1   - HEMATURIA   - PAIN   LASIX RENAL SCAN 11/2/16 WAS NORMAL WITH 49% LEFT AND 51% LEFT RENAL FUNCTION   PATHOLOGY FROM LEFT URETERAL TUMOR BIOPSY ON 10/25/16 WAS POSITIVE FOR LOW GRADE PAPILLARY UROTHELIAL CARCINOMA, NON INVASIVE   CT SCAN ON 9/19/16 SHOWED LEFT HYDRONEPHROSIS AND AN ENHANCING LEFT DISTAL URETERAL TUMOR (NO METS)   BUN AND CREATININE ON 10/30/19 WERE 14 AND 0.8, RESPECTIVELY        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 The problem is on the left side. Her cancer was diagnosed 4 years ago. She has had laser ablation to treat her transitional cell carcinoma. She has not received radiation therapy. She did not receive chemotherapy for her cancer. She did not see blood in her urine. She is not having pain in new locations. She does have a good appetite. Her bowels are moving normally. She has not recently had unwanted weight loss. Her last cysto was 2 years ago. Her last radiologic test to evaluate the kidneys was 4 years ago.         ALLERGIES: Enalapril Maleate  Sulfa       MEDICATIONS: Oncovite tablet 2 tablet PO BID   Amlodipine Besilate   Losartan Potassium   Lumigan   Rhopressa   Simvastatin   Timolol Maleate   Vitamin D3         PSH: Bladder Instill AntiCA Agent - 2017, 2017, 2017, 2017, 2017, 2017  Cysto Uretero Biopsy Fulgura, Left, W/ LEFT JJ URETERAL STENT INSERTION - 2016  Cysto Uretero W/excise Tumor, Left, W/URETERAL STENT EXCHANGE - 2016  Cystoscopy Insert Stent - 10/19/2017  Cystoscopy Ureteroscopy, Left - 2018 - at 1 - [address] [facility]. 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 Does not use smokeless tobacco.  Has never drank. Does not use drugs. Drinks 1 caffeinated drink per day. Has not had a blood transfusion. Patient's occupation is/was RETIRED. Notes: FOUR CHILDREN     REVIEW OF SYSTEMS:     Constitutional:   Patient denies fever, chills, and weight loss. Eyes:   Patient denies wearing glasses. Ears, Nose, Mouth, Throat:   Patient denies hearing loss. Cardiovascular:   Patient denies chest pains, swollen ankles, and irregular heartbeat. Respiratory:   Patient denies shortness of breath, wheezing, and chronic cough. Gastrointestinal:   Patient denies abdominal pain, nausea/vomiting, and change in bowels.    Genitourinary:   Patient denies incontinence, painful urination, blood in urine, gerard catheter, and suprapubic catheter. Musculoskeletal:   Patient denies using wheelchair, using walker, and using cane. Integumentary/Skin:   Patient denies rash, persistent itching, and skin cancer history. Neurological:   Patient denies numbness, tingling, dizziness, and altered mental status. Hematologic/Lymphatic:   Patient denies swollen glands, abnormal bleeding, and history blood transfusion. VITAL SIGNS:        Weight 145 lb / 65.77 kg   Height 63 in / 160.02 cm   Pulse 72 /min   Resp. Rate 18 /min   BMI 25.7 kg/m²     MULTI-SYSTEM PHYSICAL EXAMINATION:     Constitutional: Well-nourished. No physical deformities. Normally developed. Good grooming. Neck: Neck symmetrical, not swollen. Normal tracheal position. Respiratory: No labored breathing, no use of accessory muscles. Cardiovascular: Normal temperature, normal extremity pulses, no swelling, no varicosities. Lymphatic: No enlargement of neck, axillae, groin. Skin: No paleness, no jaundice, no cyanosis. No lesion, no ulcer, no rash. Neurologic / Psychiatric: Oriented to time, oriented to place, oriented to person. No depression, no anxiety, no agitation. Gastrointestinal: No mass, no tenderness, no rigidity, non obese abdomen. Eyes: Normal conjunctivae. Normal eyelids. Ears, Nose, Mouth, and Throat: Left ear no scars, no lesions, no masses. Right ear no scars, no lesions, no masses. Nose no scars, no lesions, no masses. Normal hearing. Normal lips. Musculoskeletal: Normal gait and station of head and neck. PAST DATA REVIEWED:   Source Of History:  Patient, Family/Caregiver   Lab Test Review:   Creatinine, Blood Urea Nitrogen (BUN)   Records Review:   Pathology Reports, Previous Patient Records   X-Ray Review: C.T. Abdomen/Pelvis: Reviewed Report.         PROCEDURES: None     ASSESSMENT:       ICD-10 Details   1 :   Malignant neoplasm of unspecified ureter - C66.9 Left, Stable 2   Urinary Tract Inf, Unspec site - N39.0 Improving     PLAN:             Orders  Labs Urine Cytology             Schedule  Return Visit/Planned Activity: Follow Up After Testing   Procedure: Unspecified Date at 3 - [facility]. Artist Pegs - [id number] - Cysto Uretero Biopsy Fulgura - [id number], left, RETROGRADE PYELOGRAPHY, POSSIBLE JJ URETERAL STENT INSERTION, LEFT KIDNEY WASHINGS FOR CYTOLOGY         The patient and I talked at length about etiologies of urinary tract infection. We discussed bacterial and viral UTIs. We discussed the possible relationship between urinary tract infection and bladder outlet obstruction, neurogenic bladder, urethral stenosis, urethral stricture, meatal stenosis, urinary tract stones, congenital urinary tract abnormalities, acquired urinary tract abnormalities, hydronephrosis, ureteral obstruction, and the development of chronic cystitis. Alternative treatment options were discussed with the patient in detail. All questions were answered    Antibiotics, anti-inflammatories, and urinary analgesics were discussed with the patient. The patient gives informed consent to proceed with conservative treatment of their urinary tract infection with urinary tract imaging as indicated. The patient was given instructions to call for abdominal pain, pelvic pain, perirectal pain, nausea, vomiting, diarrhea, fever over 100? ?F, chills, hematuria, dysuria, frequency, urgency, or urge incontinence. She feels well today. She does not need additional antibiotics at this time. I recommended she undergo surveillance cystoscopy with left ureteroscopy and possible biopsy with fulguration as she has had done in the past. She may require additional postoperative intravesical BCG if she has recurrence of her tumor in the left distal ureter.

## 2021-09-01 NOTE — PROGRESS NOTES
Rick [facility] PRE-ADMISSION TESTING GENERAL INSTRUCTIONS- [facility]-phone number:[phone]    GENERAL INSTRUCTIONS  [x] Antibacterial Soap shower Night before and/or AM of Surgery  [] Hernán wipe instruction sheet and wipes given. [x] Nothing by mouth after midnight, including gum, candy, mints, or water. [x] You may brush your teeth, gargle, but do NOT swallow water. []Hibiclens shower  the night before and the morning of surgery. Do not use             Hibiclens on your face or head. [x]No smoking, chewing tobacco, illegal drugs, or alcohol within 24 hours of your surgery. [x] Jewelry, valuables or body piercing's should not be brought to the hospital. All body and/or tongue piercing's must be removed prior to arriving to hospital.  ALL hair pins must be removed. [x] Do not wear makeup, lotions, powders, deodorant. Nail polish as directed by the nurse. [x] Arrange transportation with a responsible adult  to and from the hospital. If you do not have a responsible adult  to transport you, you will need to make arrangements with a medical transportation company (i.e. [employer]. A Uber/taxi/bus is not appropriate unless you are accompanied by a responsible adult ). Arrange for someone to be with you for the remainder of the day and for 24 hours after your procedure due to having had anesthesia. Who will be your  for transportation?__daughter noris   Who will be staying with you for 24 hrs after your procedure?____daughter___debbie___________  [x] Bring insurance card and photo ID.  [] Transfusion Bracelet: Please bring with you to hospital, day of surgery  [] Bring urine specimen day of surgery. Any small container is acceptable. [] Use inhalers the morning of surgery and bring with you to hospital.  [] Bring copy of living will or healthcare power of  papers to be placed in your electronic record.   [] CPAP/BI-PAP: Please bring your machine if you are to spend the night in the hospital.     PARKING INSTRUCTIONS:   [x] Arrival Time:__0600, wear mask___________  · [x] Parking lot '\"I\"  is located on [facility] (the corner of [facility] and [facility]). To enter, press the button and the gate will lift. A free token will be provided to exit the lot. One car per patient is allowed to park in this lot. All other cars are to park on [address] [place] [address] either in the parking garage or the handicap lot. [] To reach the [facility] lobby from [address] [place] [address], upon entering the hospital, take elevator B to the 3rd floor. EDUCATION INSTRUCTIONS:      [] Knee or hip replacement booklet & exercise pamphlets given. [] [address] placed in chart. [] Pre-admission Testing educational folder given  [] Incentive Spirometry,coughing & deep breathing exercises reviewed. []Medication information sheet(s)   []Fluoroscopy-Xray used in surgery reviewed with patient. Educational pamphlet placed in chart. []Pain: Post-op pain is normal and to be expected. You will be asked to rate your pain from 0-10(a zero is not acceptable-education is needed). Your post-op pain goal is:  [] Ask your nurse for your pain medication. [] Joint camp offered. [] Joint replacement booklets given. [] Other:___________________________    MEDICATION INSTRUCTIONS:   [x]Bring a complete list of your medications, please write the last time you took the medicine, give this list to the nurse. [x] Take the following medications the morning of surgery with 1-2 ounces of water: see list   [x] Stop herbal supplements and vitamins 5 days before your surgery. [] DO NOT take any diabetic medicine the morning of surgery. Follow instructions for insulin the day before surgery. [] If you are diabetic and your blood sugar is low or you feel symptomatic, you may drink 1-2 ounces of apple juice or take a glucose tablet.   The morning of your procedure, you may call the pre-op area if you have concerns about your blood sugar [phone]. [] Use your inhalers the morning of surgery. Bring your emergency inhaler with you day of surgery. [] Follow physician instructions regarding any blood thinners you may be taking. WHAT TO EXPECT:  [x] The day of surgery you will be greeted and checked in by the [employer].  In addition, you will be registered in the [place] by a Patient Access Representative. Please bring your photo ID and insurance card. A nurse will greet you in accordance to the time you are needed in the pre-op area to prepare you for surgery. Please do not be discouraged if you are not greeted in the order you arrive as there are many variables that are involved in patient preparation. Your patience is greatly appreciated as you wait for your nurse. Please bring in items such as: books, magazines, newspapers, electronics, or any other items  to occupy your time in the waiting area. [x]  Delays may occur with surgery and staff will make a sincere effort to keep you informed of delays. If any delays occur with your procedure, we apologize ahead of time for your inconvenience as we recognize the value of your time.

## 2021-09-07 NOTE — ANESTHESIA PRE PROCEDURE
Department of Anesthesiology  Preprocedure Note       Name:  Anabelle Median   Age:  80 y.o.  :  3/20/1928                                          MRN:  [id number]         Date:  2021      Surgeon: Berenice Barlow):  Kev Mayer MD    Procedure: Procedure(s):  CYSTOURETHROSCOPY, BLADDER WASHING CYTOLOGY, LEFT SEMI RIDGID URETEROSCOPY WITH INTRACOPOREAL LASER ALBATION OF RECURRENT URETERAL TUMOR WITH FOLGURATION WITH LEFT STENT CHANGE  CYSTOSCOPY RETROGRADE PYELOGRAM    Medications prior to admission:   Prior to Admission medications    Medication Sig Start Date End Date Taking? Authorizing Provider   citalopram (CELEXA) 10 MG tablet Take 1 tablet by mouth every morning 21   Ganga Lujan DO   amLODIPine (NORVASC) 2.5 MG tablet Take 1 tablet by mouth every morning 20   Ganga Lujan, DO   losartan (COZAAR) 100 MG tablet Take 1 tablet by mouth daily 20   Ganga Lujan, DO   simvastatin (ZOCOR) 20 MG tablet Take 1 tablet by mouth nightly 20   Ganga Lujan DO   ammonium lactate (LAC-HYDRIN) 12 % lotion Apply topically daily. 4/3/20   Ganga Lujan DO   Multiple Vitamins-Minerals (ONCOVITE PO) Take by mouth daily    Historical Provider, MD   Cholecalciferol (VITAMIN D3) 50 MCG (2000) CAPS Take 1 capsule by mouth daily 19   Ganga Lujan DO   bimatoprost (LUMIGAN) 0.01 % SOLN ophthalmic drops Place 1 drop into both eyes nightly    Historical Provider, MD   dorzolamide-timolol (COSOPT) 22.3-6.8 MG/ML ophthalmic solution Place 1 drop into both eyes 2 times daily    Historical Provider, MD   RHOPRESSA 0.02 % SOLN Apply 1 drop to eye daily Indications: administer one drop in both eyes every morning 10/30/19   Historical Provider, MD       Current medications:    No current facility-administered medications for this visit. No current outpatient medications on file.      Facility-Administered Medications Ordered in Other Visits   Medication Dose Route Frequency Provider Last Rate Last Admin    0.9 % sodium chloride infusion   IntraVENous Continuous Brandon Sheikh MD        gentamicin (GARAMYCIN) IVPB 80 mg  80 mg IntraVENous On Call to [address], MD        HYDROcodone-acetaminophen (NORCO) 5-325 MG per tablet 1 tablet  1 tablet Oral PRN Madelyn Krabbe, MD        Or    HYDROcodone-acetaminophen [facility]) 5-325 MG per tablet 2 tablet  2 tablet Oral PRN Madelyn Krabbe, MD           Allergies: Allergies   Allergen Reactions    Enalapril Maleate Other (See Comments)     Other reaction(s): cough/rash    Penicillins      ?  Sulfa Antibiotics      ?        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    Kidney disease, chronic, stage III (GFR 30-59 ml/min) ([facility]) N18.30    Gross hematuria R31.0    Bladder tumor D49.4    Ureteral tumor D49.59    Abdominal pain R10.9    Hydronephrosis N13.30    Hydroureter N13.4    Lesion of lumbar spine M89.9    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 ([address] C79.49       Past Medical History:        Diagnosis Date    Abdominal pain 1/29/2015    Acute cholecystitis 10/29/2019    Diet-controlled diabetes mellitus ([address] 8/20/2019    no med    Gastritis 1/30/2015    Glaucoma     Hematemesis 1/29/2015    [place] (hard of hearing)     Hyperlipidemia     Hypertension     PONV (postoperative nausea and vomiting)        Past Surgical History:        Procedure Laterality Date    CT [facility] NEW ACCESS  8/27/2020    CT [facility] NEW ACCESS 8/27/2020 Neville Pineda MD [facility] CT    CYSTOSCOPY  04/04/2017    cysto retro left ureteroscopy, stent removal with Dr. Patel Seek  07/18/2017    stent placement    CYSTOSCOPY  10/19/2017    Left stent placement    CYSTOSCOPY N/A 9/22/2020    CYSTO URETHROSCOPY, RETROGRADE PYELOGRAM, BLADDER TUMOR RESECTION AND FULGURATION, POSSIBLE LEFT URETEROSCOPY AND LASER TUMOR ABLATION,  POSSIBLE LEFT STENT INSERTION performed by Gertrude Clemons MD at [address] Bilateral 1/5/2021    CYSTOSCOPY, BILATERAL RETROGRADE PYELOGRAM,WITH BIOPSY,  LEFT URETEROSCOPY, LASER ABLATION OF URETERAL TUMOR WITH LEFT STENT CHANGE performed by Gertrude Clemons MD at [address] [address] / Aiad Bakerter / Jeremiah Villarreali Left 8/20/2020    CYSTOSCOPY, TRANSURETHRAL RESECTION BLADDER TUMOR performed by Gertrude Clemons MD at [address] [facility] LITHOTRIPSY Left 4/29/2021    CYSTOURETHROSCOPY, BLADDER WASHINGS FOR CYTOLOGY, , LEFT SEMI RIGID URETEROSCOPY WITH INTRACORPOREAL LASER ABLATION OF RECURRENT LEFT URETERAL TUMOR AND LEFT STENT CHANGE performed by Gertrude Clemons MD at [address] [place] [address] Left 4/17/2018    CYSTOSCOPY RETROGRADE PYELOGRAM BLADDER AND LEFT URETERAL WASHINGS INSERTION LEFT URETERAL STENT LEFT URETEROSCOPY performed by Gertrude Clemons MD at [place] [address]  1/29/15       Social History:    Social History     Tobacco Use    Smoking status: Never Smoker    Smokeless tobacco: Never Used   Substance Use Topics    Alcohol use: Never                                Counseling given: Not Answered      Vital Signs (Current): There were no vitals filed for this visit.                                            BP Readings from Last 3 Encounters:   04/29/21 (!) 144/68   04/29/21 101/60   02/22/21 128/78       NPO Status:                                                                                  ECHO: 10/30/2019   Summary   Left ventricular size is grossly normal.   Mild left ventricular concentric hypertrophy noted. No regional wall motion abnormalities seen. Ejection fraction is visually estimated at 60%. Moderate aortic stenosis is present. [id number]  Sinus rhythm with occasional premature ventricular complexes and premature atrial complexes  Left axis deviation  Abnormal ECG  No previous ECGs available    CT: 2020  1. Redemonstration of mass associated with anterior aspect of the   urinary bladder as well as nodular thickening involving posterior   inferior wall of urinary bladder at origin of left ureter. These   findings are likely related to known urinary bladder neoplasm.       2. Stable left hydroureter and left hydronephrosis.       3. New gallbladder wall thickening with surrounding inflammatory   changes and multiple calcified gallstones. Findings could suggest   acute cholecystitis.       4. Stable small free fluid is located within the pelvis.       5. Inflammatory changes surrounding the urinary bladder suggestive of   cystitis.       6. Diverticulosis without evidence of acute diverticulitis.       7. Redemonstration of lytic lesions involving L2 and T11 vertebral   bodies.       8. Stable compression fracture involving L1. CT: 2020      1. New mild posterior bibasilar atelectasis right more than left.       2. Worsening of left hydronephrosis, and left hydroureter, now severe.       3. Abnormal soft tissue density filling the dilated distal ureter   probably 6 cm down to the ureterovesical junction. Specious for some   thickening of the left bladder wall. 4. Large hematoma in the urinary bladder adjacent to an extra large   Webb balloon. Also right lateral pelvic extraperitoneal hematoma. 5. Constipation and diverticulosis without diverticulitis. 6. Chronic cholelithiasis without cholecystitis.    7. Increased size of a chronic right renal cyst.   8. No renal or ureter calculus.     BMI:   Wt Readings from Last 3 Encounters:   09/07/21 151 lb (68.5 kg)   08/23/21 151 lb 12.8 oz (68.9 kg)   04/29/21 146 lb (66.2 kg)     There is no height or weight on file to calculate BMI.    CBC:   Lab Results   Component Value Date    WBC 6.8 08/10/2021    RBC 4.54 08/10/2021    HGB 13.0 08/10/2021    HCT 41.1 08/10/2021    MCV 90.5 08/10/2021    RDW 13.6 08/10/2021     08/10/2021       CMP:   Lab Results   Component Value Date     08/10/2021    K 4.6 08/10/2021    K 3.9 10/31/2019     08/10/2021    CO2 25 08/10/2021    BUN 24 08/10/2021    CREATININE 1.3 08/10/2021    GFRAA 46 08/10/2021    LABGLOM 38 08/10/2021    GLUCOSE 111 08/10/2021    PROT 7.4 08/10/2021    CALCIUM 9.9 08/10/2021    BILITOT 0.5 08/10/2021    ALKPHOS 99 08/10/2021    AST 15 08/10/2021    ALT 8 08/10/2021       POC Tests: No results for input(s): POCGLU, POCNA, POCK, POCCL, POCBUN, POCHEMO, POCHCT in the last 72 hours. Coags:   Lab Results   Component Value Date    PROTIME 11.0 12/21/2020    INR 1.0 12/21/2020    APTT 24.5 01/29/2015       HCG (If Applicable): No results found for: PREGTESTUR, PREGSERUM, HCG, HCGQUANT     ABGs: No results found for: PHART, PO2ART, KNV6BXB, CQJ2PJT, BEART, Q3ALGURB     Type & Screen (If Applicable):  No results found for: LABABO, LABRH    Drug/Infectious Status (If Applicable):  No results found for: HIV, HEPCAB    COVID-19 Screening (If Applicable):   Lab Results   Component Value Date    COVID19 Not Detected 04/23/2021         Anesthesia Evaluation  Patient summary reviewed and Nursing notes reviewed   history of anesthetic complications: PONV. Airway: Mallampati: II  TM distance: >3 FB   Neck ROM: full  Mouth opening: > = 3 FB Dental:      Comment: Denies any loose teeth    Pulmonary:Negative Pulmonary ROS breath sounds clear to auscultation            Patient did not smoke on day of surgery.                  Cardiovascular:  Exercise tolerance: poor (<4 METS),   (+) hypertension:, valvular problems/murmurs: AS and AI, hyperlipidemia (on zocor)      ECG reviewed  Rhythm: regular  Rate: normal  Echocardiogram reviewed         Beta Blocker:  Not on Beta Blocker         Neuro/Psych:   (+) CVA (daughter states pt. had stroke 20 years ago. No residual effects ):,              ROS comment: Glaucoma GI/Hepatic/Renal:   (+) GERD (takes tums occassionally, states well controlled recently ): well controlled, renal disease (chronic renal disease):,          ROS comment: Has choley drain from August 2020. Pt. Was too sick for gallbladder surgery so had drain placed. Drain just changed out two weeks ago. .   Endo/Other:    (+) Diabetes (pre diabetic-controlled with diet ), malignancy/cancer (ureteral cancer 2016, no chemo/radiation). Pt had no PAT visit        ROS comment: Having cystocscopy, bilateral retrograde plyeogram, left ureteroscopy, laser ablation of ureteral tumor with stent change 1/5/2021    Glaucoma-uses eye drops   Abdominal:             Vascular: negative vascular ROS. Other Findings:               Anesthesia Plan      MAC     ASA 3       Induction: intravenous. MIPS: Prophylactic antiemetics administered. Anesthetic plan and risks discussed with patient and child/children. Plan discussed with CRNA.                   Taylor Luna MD   9/7/2021

## 2021-09-07 NOTE — OP NOTE
[address] [place] [address].  Urology Post-operative Note    Debbiey Eng  YOB: 1928  [id number]    Pre-operative Diagnosis: Recurrent left ureteral urothelial cell carcinoma, overactive bladder    Post-operative Diagnosis:  Same    Procedure: Cystourethroscopy, bladder washings for cytology, bladder stone removal, left semirigid ureteroscopy with laser ablation of recurrent ureteral tumor, left JJ ureteral stent exchange    Surgeon: Lizy Louis MD    Anesthesia:   [facility]    Estimated Blood Loss:   Minimal    Complications: None    Drains: Left 8 Moroccan by 26 cm JJ ureteral stent with strings attached    Specimen(s): Bladder washings for cytology    Clinical Note:   63-year-old female diagnosed several years ago with 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 This has been managed endoscopically. She last underwent tumor ablation in April 2021. She has an indwelling stent. She has not had recent hematuria. She presents for the above listed procedure. The risks and benefits of the procedure including but not limited to infection, bleeding, possible need for stent with stent irritation, possible inability to remove all tumor requiring further procedures etc. were discussed in detail and she elected to proceed    Operative Description:   She was taken to the operating room and placed on the OR table in the supine position. She received IV antibiotics preoperatively. Following induction of anesthesia she was repositioned into the dorsolithotomy position. A  film KUB showed a left ureteral stent with minimal stone debris. Her external genitalia and abdomen were then prepped and draped sterilely. A 21 Moroccan cystoscope with 30 degree lens was inserted per urethra and into the bladder. There are no urethral strictures false passages or tumors. The bladder was vigorously irrigated and cytologic washings were obtained and sent to pathology for analysis.   A sensor wire cystoscope sheath. The strings the stent were brought up urethra and secured to the mons pubis with benzoin and tape. She tolerated the procedure well was taken to the PACU in stable condition. Her stent will be removed this Friday morning. She was discharged home with prescriptions for Pyridium and ibuprofen. She will have another endoscopic evaluation in 4 to 6 months. I will try her on trospium 20 mg twice daily in the meantime for overactive bladder symptoms.       Eloisa Menard MD  9/7/2021  8:47 AM

## 2021-09-07 NOTE — BRIEF OP NOTE
Brief Postoperative Note      Patient: Daylin Tavera  YOB: 1928  MRN: [id number]    Date of Procedure: 9/7/2021    Pre-Op Diagnosis: HYDRONEPHROSIS WITH HISTORY OF BLADDER CANCER AND URETERAL TUMOR    Post-Op Diagnosis: Same       Procedure(s):  CYSTOURETHROSCOPY, BLADDER WASHING CYTOLOGY, LEFT SEMI RIDGID URETEROSCOPY WITH INTRACOPOREAL LASER ALBATION OF RECURRENT URETERAL TUMOR WITH FULGURATION WITH LEFT STENT CHANGE    Surgeon(s):  Jessica Cronin MD    Assistant:  * No surgical staff found *    Anesthesia: Monitor Anesthesia Care    Estimated Blood Loss (mL): Minimal    Complications: None    Specimens:   ID Type Source Tests Collected by Time Destination   A : BLADDER WASHINGS FOR CYTOLOGY Urine Urine, Cystoscopic CYTOLOGY, NON-GYN Jessica Cronin MD 9/7/2021 0918        Implants:  Implant Name Type Inv.  Item Serial No.  Lot No. LRB No. Used Action   STENT URET POLARIS ULT HYDR+ COAT 8 FR X 26 CM  STENT URET POLARIS ULT HYDR+ COAT 8 FR X 26 CM  [employer]OLOG[employer]- [id number] Left 1 Implanted         Drains:   Closed/Suction Drain Right RUQ Bulb 10 [employer] (Active)       Findings: Tumor    Electronically signed by Jessica Cronin MD on 9/7/2021 at 8:43 AM

## 2021-09-07 NOTE — H&P
NO ISSUES AT THIS TIME   HER DAUGHTER AND SON ARE WITH HER TODAY   S/P URETEROSCOPY WAS ON 4/17/18 WHICH SHOWED NO RECURRENT DISEASE   URINE CYTOLOGY ON 4/17/18 WAS NEGATIVE   4/4/17: URINE CYTOLOGY WAS NEGATIVE   URINE CYTOLOGY ON 10/25/16 WAS NEGATIVE   URINE CULTURE ON 4/4/17 GREW ENTEROCOCCUS AND PSEUDOMONAS AND WAS TREATED WITH ANTIBIOTICS WHICH SHE FINISHED   2/22/17: COMPLETED 6 WEEKLY BCG TREATMENTS   + NOCTURIA X1   - HEMATURIA   - PAIN   LASIX RENAL SCAN 11/2/16 WAS NORMAL WITH 49% LEFT AND 51% LEFT RENAL FUNCTION   PATHOLOGY FROM LEFT URETERAL TUMOR BIOPSY ON 10/25/16 WAS POSITIVE FOR LOW GRADE PAPILLARY UROTHELIAL CARCINOMA, NON INVASIVE   CT SCAN ON 9/19/16 SHOWED LEFT HYDRONEPHROSIS AND AN ENHANCING LEFT DISTAL URETERAL TUMOR (NO METS)   BUN AND CREATININE ON 10/30/19 WERE 14 AND 0.8, RESPECTIVELY         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     The problem is on the left side. Her cancer was diagnosed 4 years ago.      She has had laser ablation to treat her transitional cell carcinoma. She has not received radiation therapy. She did not receive chemotherapy for her cancer.      She did not see blood in her urine. She is not having pain in new locations. She does have a good appetite. Her bowels are moving normally. She has not recently had unwanted weight loss.      Her last cysto was 2 years ago.  Her last radiologic test to evaluate the kidneys was 4 years ago.         ALLERGIES: Enalapril Maleate  Sulfa        MEDICATIONS: Oncovite tablet 2 tablet PO BID   Amlodipine Besilate   Losartan Potassium   Lumigan   Rhopressa   Simvastatin   Timolol Maleate   Vitamin D3          PSH: Bladder Instill AntiCA Agent - 2017, 2017, 2017, 2017, 2017, 2017  Cysto Uretero Biopsy Fulgura, Left, W/ LEFT JJ URETERAL STENT INSERTION - 2016  Cysto Uretero W/excise Tumor, Left, W/URETERAL STENT EXCHANGE - 2016  Cystoscopy Insert Stent - 10/19/2017  Cystoscopy Ureteroscopy, Left - 2018 - at [address] [place] - [id number], 2017, Left - 2017  Hysterectomy        NON- PSH: None       PMH: Dysuria - 2017  Malignant neoplasm of bladder, unspecified, S/P INTRAVESICAL BC17--17 - 2017  Malignant neoplasm of unspecified ureter, Left, LOW GRADE, NON-INVASIVE UROTHELIAL CARCINOMA - 2016  Hydroureter, Left - 2016  Cyst of kidney, acquired, Right  Family history of malignant neoplasm of kidney, SON HAS BLADDER CANCER  Urinary Tract Inf, Unspec site        NON- PMH: Dvrtclos of lg int w/o perforation or abscess w/o bleeding  Essential (primary) hypertension  Gastro-esophageal reflux disease without esophagitis  Other cerebrovascular disease  Other fracture of unspecified lumbar vertebra, sequela  Other specified glaucoma  Pure hypercholesterolemia, unspecified  Type 2 diabetes mellitus without complications        FAMILY HISTORY: Bladder Cancer - Son  Heart Disease - Runs in Family  Kidney Cancer - Runs in Family      SOCIAL HISTORY: Marital Status:   Preferred Language: English; Ethnicity: Unknown; Race: White  Current Smoking Status: Patient has never smoked. Does not use smokeless tobacco.  Has never drank. Does not use drugs. Drinks 1 caffeinated drink per day. Has not had a blood transfusion. Patient's occupation is/was RETIRED. Notes: FOUR CHILDREN      REVIEW OF SYSTEMS:     Constitutional:   Patient denies fever, chills, and weight loss. Eyes:   Patient denies wearing glasses. Ears, Nose, Mouth, Throat:   Patient denies hearing loss. Cardiovascular:   Patient denies chest pains, swollen ankles, and irregular heartbeat. Respiratory:   Patient denies shortness of breath, wheezing, and chronic cough. Gastrointestinal:   Patient denies abdominal pain, nausea/vomiting, and change in bowels.    Genitourinary:   Patient denies incontinence, painful urination, blood in urine, gerard catheter, and suprapubic catheter. Musculoskeletal:   Patient denies using wheelchair, using walker, and using cane. Integumentary/Skin:   Patient denies rash, persistent itching, and skin cancer history. Neurological:   Patient denies numbness, tingling, dizziness, and altered mental status. Hematologic/Lymphatic:   Patient denies swollen glands, abnormal bleeding, and history blood transfusion.      VITAL SIGNS:        Weight 145 lb / 65.77 kg   Height 63 in / 160.02 cm   Pulse 72 /min   Resp. Rate 18 /min   BMI 25.7 kg/m²      MULTI-SYSTEM PHYSICAL EXAMINATION:     Constitutional: Well-nourished. No physical deformities. Normally developed. Good grooming. Neck: Neck symmetrical, not swollen. Normal tracheal position. Respiratory: No labored breathing, no use of accessory muscles. Cardiovascular: Normal temperature, normal extremity pulses, no swelling, no varicosities. Lymphatic: No enlargement of neck, axillae, groin. Skin: No paleness, no jaundice, no cyanosis. No lesion, no ulcer, no rash. Neurologic / Psychiatric: Oriented to time, oriented to place, oriented to person. No depression, no anxiety, no agitation. Gastrointestinal: No mass, no tenderness, no rigidity, non obese abdomen. Eyes: Normal conjunctivae. Normal eyelids. Ears, Nose, Mouth, and Throat: Left ear no scars, no lesions, no masses. Right ear no scars, no lesions, no masses. Nose no scars, no lesions, no masses. Normal hearing. Normal lips. Musculoskeletal: Normal gait and station of head and neck.         PAST DATA REVIEWED:   Source Of History:  Patient, Family/Caregiver   Lab Test Review:   Creatinine, Blood Urea Nitrogen (BUN)   Records Review:   Pathology Reports, Previous Patient Records   X-Ray Review: C.T.  Abdomen/Pelvis: Reviewed Report.         PROCEDURES: None      ASSESSMENT:       ICD-10 Details   1 :   Malignant neoplasm of unspecified ureter - C66.9 Left, Stable   2   Urinary Tract Inf, Unspec site - N39.0 Improving      PLAN:              Orders  Labs Urine Cytology               Schedule  Return Visit/Planned Activity: Follow Up After Testing   Procedure: Unspecified Date at 3 - [address] [facility]. [place] - [address] - Cysto Uretero Biopsy Fulgura - [address], left, RETROGRADE PYELOGRAPHY, POSSIBLE JJ URETERAL STENT INSERTION, LEFT KIDNEY WASHINGS FOR CYTOLOGY            The patient and I talked at length about etiologies of urinary tract infection. We discussed bacterial and viral UTIs. We discussed the possible relationship between urinary tract infection and bladder outlet obstruction, neurogenic bladder, urethral stenosis, urethral stricture, meatal stenosis, urinary tract stones, congenital urinary tract abnormalities, acquired urinary tract abnormalities, hydronephrosis, ureteral obstruction, and the development of chronic cystitis. Alternative treatment options were discussed with the patient in detail. All questions were answered     Antibiotics, anti-inflammatories, and urinary analgesics were discussed with the patient.      The patient gives informed consent to proceed with conservative treatment of their urinary tract infection with urinary tract imaging as indicated.     The patient was given instructions to call for abdominal pain, pelvic pain, perirectal pain, nausea, vomiting, diarrhea, fever over 100? ?F, chills, hematuria, dysuria, frequency, urgency, or urge incontinence.            She feels well today. She does not need additional antibiotics at this time. I recommended she undergo surveillance cystoscopy with left ureteroscopy and possible biopsy with fulguration as she has had done in the past. She may require additional postoperative intravesical BCG if she has recurrence of her tumor in the left distal ureter.

## 2021-09-07 NOTE — PROGRESS NOTES
CLINICAL PHARMACY NOTE: MEDS TO BEDS    Total # of Prescriptions Filled: 2   The following medications were delivered to the patient:  · Ibuprofen 400  · Phenazopyridine 100    Additional Documentation:

## 2021-09-07 NOTE — ANESTHESIA POSTPROCEDURE EVALUATION
Department of Anesthesiology  Postprocedure Note    Patient: Yvette Mendez  MRN: [id number]  YOB: 1928  Date of evaluation: 9/7/2021  Time:  10:16 AM     Procedure Summary     Date: 09/07/21 Room / Location: [facility] OR  / [facility]    Anesthesia Start: 4018 Anesthesia Stop: 7949    Procedure: CYSTOURETHROSCOPY, BLADDER WASHING CYTOLOGY, LEFT SEMI RIDGID URETEROSCOPY WITH INTRACOPOREAL LASER ALBATION OF RECURRENT URETERAL TUMOR WITH FULGURATION WITH LEFT STENT CHANGE (Left Bladder) Diagnosis: (HYDRONEPHROSIS WITH HISTORY OF BLADDER CANCER AND URETERAL TUMOR)    Surgeons: Eloisa Menard MD Responsible Provider: Bertha Mann MD    Anesthesia Type: MAC ASA Status: 3          Anesthesia Type: MAC    Johnna Phase I: Johnna Score: 10    Johnna Phase II: Johnna Score: 10    Last vitals: Reviewed and per EMR flowsheets.        Anesthesia Post Evaluation    Patient location during evaluation: PACU  Patient participation: complete - patient participated  Level of consciousness: awake and alert  Airway patency: patent  Nausea & Vomiting: no nausea and no vomiting  Complications: no  Cardiovascular status: hemodynamically stable  Respiratory status: acceptable  Hydration status: euvolemic

## 2022-01-01 ENCOUNTER — APPOINTMENT (OUTPATIENT)
Dept: GENERAL RADIOLOGY | Age: 87
DRG: 189 | End: 2022-01-01
Payer: COMMERCIAL

## 2022-01-01 ENCOUNTER — ANESTHESIA EVENT (OUTPATIENT)
Dept: OPERATING ROOM | Age: 87
End: 2022-01-01
Payer: COMMERCIAL

## 2022-01-01 ENCOUNTER — APPOINTMENT (OUTPATIENT)
Dept: CT IMAGING | Age: 87
DRG: 189 | End: 2022-01-01
Payer: COMMERCIAL

## 2022-01-01 ENCOUNTER — ANESTHESIA (OUTPATIENT)
Dept: OPERATING ROOM | Age: 87
End: 2022-01-01
Payer: COMMERCIAL

## 2022-01-01 ENCOUNTER — OFFICE VISIT (OUTPATIENT)
Dept: PRIMARY CARE CLINIC | Age: 87
End: 2022-01-01
Payer: COMMERCIAL

## 2022-01-01 ENCOUNTER — APPOINTMENT (OUTPATIENT)
Dept: GENERAL RADIOLOGY | Age: 87
End: 2022-01-01
Attending: UROLOGY
Payer: COMMERCIAL

## 2022-01-01 ENCOUNTER — HOSPITAL ENCOUNTER (OUTPATIENT)
Age: 87
Setting detail: OUTPATIENT SURGERY
Discharge: HOME | End: 2022-03-08
Attending: UROLOGY | Admitting: UROLOGY
Payer: COMMERCIAL

## 2022-01-01 ENCOUNTER — HOSPITAL ENCOUNTER (INPATIENT)
Age: 87
LOS: 1 days | Discharge: HOSPICE | DRG: 189 | End: 2022-04-22
Attending: STUDENT IN AN ORGANIZED HEALTH CARE EDUCATION/TRAINING PROGRAM | Admitting: INTERNAL MEDICINE
Payer: COMMERCIAL

## 2022-01-01 ENCOUNTER — PREP FOR PROCEDURE (OUTPATIENT)
Dept: UROLOGY | Age: 87
End: 2022-01-01

## 2022-01-01 VITALS
WEIGHT: 142 LBS | BODY MASS INDEX: 26.13 KG/M2 | DIASTOLIC BLOOD PRESSURE: 77 MMHG | SYSTOLIC BLOOD PRESSURE: 110 MMHG | HEIGHT: 62 IN | OXYGEN SATURATION: 96 % | HEART RATE: 57 BPM | RESPIRATION RATE: 16 BRPM | TEMPERATURE: 97 F

## 2022-01-01 VITALS
SYSTOLIC BLOOD PRESSURE: 138 MMHG | DIASTOLIC BLOOD PRESSURE: 83 MMHG | BODY MASS INDEX: 26.16 KG/M2 | TEMPERATURE: 97.2 F | WEIGHT: 143 LBS

## 2022-01-01 VITALS
HEIGHT: 65 IN | SYSTOLIC BLOOD PRESSURE: 102 MMHG | TEMPERATURE: 98.1 F | DIASTOLIC BLOOD PRESSURE: 54 MMHG | HEART RATE: 71 BPM | WEIGHT: 137 LBS | BODY MASS INDEX: 22.82 KG/M2 | RESPIRATION RATE: 22 BRPM | OXYGEN SATURATION: 90 %

## 2022-01-01 VITALS
DIASTOLIC BLOOD PRESSURE: 52 MMHG | RESPIRATION RATE: 19 BRPM | SYSTOLIC BLOOD PRESSURE: 93 MMHG | TEMPERATURE: 98.2 F | OXYGEN SATURATION: 97 %

## 2022-01-01 DIAGNOSIS — E03.9 ACQUIRED HYPOTHYROIDISM: ICD-10-CM

## 2022-01-01 DIAGNOSIS — C79.49 SECONDARY MALIGNANT NEOPLASM OF OTHER PARTS OF NERVOUS SYSTEM (HCC): ICD-10-CM

## 2022-01-01 DIAGNOSIS — C66.2 URETERAL CARCINOMA, LEFT (HCC): Chronic | ICD-10-CM

## 2022-01-01 DIAGNOSIS — N18.31 STAGE 3A CHRONIC KIDNEY DISEASE (HCC): Chronic | ICD-10-CM

## 2022-01-01 DIAGNOSIS — N12 PYELONEPHRITIS: Primary | ICD-10-CM

## 2022-01-01 DIAGNOSIS — E78.2 MIXED HYPERLIPIDEMIA: Chronic | ICD-10-CM

## 2022-01-01 DIAGNOSIS — C79.9 METASTATIC MALIGNANT NEOPLASM, UNSPECIFIED SITE (HCC): ICD-10-CM

## 2022-01-01 DIAGNOSIS — F41.9 ANXIETY: Chronic | ICD-10-CM

## 2022-01-01 DIAGNOSIS — N18.32 STAGE 3B CHRONIC KIDNEY DISEASE (HCC): Chronic | ICD-10-CM

## 2022-01-01 DIAGNOSIS — R09.02 HYPOXIA: ICD-10-CM

## 2022-01-01 DIAGNOSIS — I10 ESSENTIAL HYPERTENSION: Primary | Chronic | ICD-10-CM

## 2022-01-01 DIAGNOSIS — E55.9 VITAMIN D DEFICIENCY: ICD-10-CM

## 2022-01-01 DIAGNOSIS — R77.8 ELEVATED TROPONIN: ICD-10-CM

## 2022-01-01 DIAGNOSIS — R73.03 PRE-DIABETES: ICD-10-CM

## 2022-01-01 DIAGNOSIS — Z01.812 PRE-OPERATIVE LABORATORY EXAMINATION: Primary | ICD-10-CM

## 2022-01-01 LAB
ALBUMIN SERPL-MCNC: 3.3 G/DL (ref 3.5–5.2)
ALBUMIN SERPL-MCNC: 3.9 G/DL (ref 3.5–5.2)
ALP BLD-CCNC: 119 U/L (ref 35–104)
ALP BLD-CCNC: 149 U/L (ref 35–104)
ALT SERPL-CCNC: 17 U/L (ref 0–32)
ALT SERPL-CCNC: 9 U/L (ref 0–32)
AMORPHOUS: ABNORMAL
ANION GAP SERPL CALCULATED.3IONS-SCNC: 14 MMOL/L (ref 7–16)
ANION GAP SERPL CALCULATED.3IONS-SCNC: 15 MMOL/L (ref 7–16)
AST SERPL-CCNC: 25 U/L (ref 0–31)
AST SERPL-CCNC: 32 U/L (ref 0–31)
BACTERIA: ABNORMAL /HPF
BACTERIA: ABNORMAL /HPF
BASOPHILS ABSOLUTE: 0.06 E9/L (ref 0–0.2)
BASOPHILS ABSOLUTE: 0.06 E9/L (ref 0–0.2)
BASOPHILS RELATIVE PERCENT: 0.7 % (ref 0–2)
BASOPHILS RELATIVE PERCENT: 0.8 % (ref 0–2)
BILIRUB SERPL-MCNC: 0.2 MG/DL (ref 0–1.2)
BILIRUB SERPL-MCNC: 0.5 MG/DL (ref 0–1.2)
BILIRUBIN URINE: NEGATIVE
BILIRUBIN URINE: NEGATIVE
BLOOD CULTURE, ROUTINE: NORMAL
BLOOD, URINE: ABNORMAL
BLOOD, URINE: ABNORMAL
BUN BLDV-MCNC: 21 MG/DL (ref 6–23)
BUN BLDV-MCNC: 27 MG/DL (ref 6–23)
CALCIUM SERPL-MCNC: 9.4 MG/DL (ref 8.6–10.2)
CALCIUM SERPL-MCNC: 9.5 MG/DL (ref 8.6–10.2)
CHLORIDE BLD-SCNC: 100 MMOL/L (ref 98–107)
CHLORIDE BLD-SCNC: 103 MMOL/L (ref 98–107)
CHOLESTEROL, TOTAL: 174 MG/DL (ref 0–199)
CLARITY: ABNORMAL
CLARITY: CLEAR
CO2: 20 MMOL/L (ref 22–29)
CO2: 20 MMOL/L (ref 22–29)
COLOR: YELLOW
COLOR: YELLOW
CREAT SERPL-MCNC: 1.5 MG/DL (ref 0.5–1)
CREAT SERPL-MCNC: 1.7 MG/DL (ref 0.5–1)
CULTURE, BLOOD 2: NORMAL
EKG ATRIAL RATE: 85 BPM
EKG P AXIS: 20 DEGREES
EKG P-R INTERVAL: 124 MS
EKG Q-T INTERVAL: 366 MS
EKG QRS DURATION: 80 MS
EKG QTC CALCULATION (BAZETT): 435 MS
EKG T AXIS: 54 DEGREES
EKG VENTRICULAR RATE: 85 BPM
EOSINOPHILS ABSOLUTE: 0.08 E9/L (ref 0.05–0.5)
EOSINOPHILS ABSOLUTE: 0.11 E9/L (ref 0.05–0.5)
EOSINOPHILS RELATIVE PERCENT: 1.1 % (ref 0–6)
EOSINOPHILS RELATIVE PERCENT: 1.2 % (ref 0–6)
GFR AFRICAN AMERICAN: 34
GFR AFRICAN AMERICAN: 39
GFR NON-AFRICAN AMERICAN: 28 ML/MIN/1.73
GFR NON-AFRICAN AMERICAN: 32 ML/MIN/1.73
GLUCOSE BLD-MCNC: 109 MG/DL (ref 74–99)
GLUCOSE BLD-MCNC: 178 MG/DL (ref 74–99)
GLUCOSE URINE: NEGATIVE MG/DL
GLUCOSE URINE: NEGATIVE MG/DL
HBA1C MFR BLD: 6.3 % (ref 4–5.6)
HCT VFR BLD CALC: 35.1 % (ref 34–48)
HCT VFR BLD CALC: 41.9 % (ref 34–48)
HDLC SERPL-MCNC: 44 MG/DL
HEMOGLOBIN: 10.8 G/DL (ref 11.5–15.5)
HEMOGLOBIN: 12.7 G/DL (ref 11.5–15.5)
IMMATURE GRANULOCYTES #: 0.02 E9/L
IMMATURE GRANULOCYTES #: 0.04 E9/L
IMMATURE GRANULOCYTES %: 0.3 % (ref 0–5)
IMMATURE GRANULOCYTES %: 0.5 % (ref 0–5)
KETONES, URINE: NEGATIVE MG/DL
KETONES, URINE: NEGATIVE MG/DL
LACTIC ACID: 2.3 MMOL/L (ref 0.5–2.2)
LDL CHOLESTEROL CALCULATED: 99 MG/DL (ref 0–99)
LEUKOCYTE ESTERASE, URINE: ABNORMAL
LEUKOCYTE ESTERASE, URINE: ABNORMAL
LYMPHOCYTES ABSOLUTE: 1.63 E9/L (ref 1.5–4)
LYMPHOCYTES ABSOLUTE: 1.74 E9/L (ref 1.5–4)
LYMPHOCYTES RELATIVE PERCENT: 19.7 % (ref 20–42)
LYMPHOCYTES RELATIVE PERCENT: 22 % (ref 20–42)
MAGNESIUM: 2.2 MG/DL (ref 1.6–2.6)
MCH RBC QN AUTO: 27.1 PG (ref 26–35)
MCH RBC QN AUTO: 28.2 PG (ref 26–35)
MCHC RBC AUTO-ENTMCNC: 30.3 % (ref 32–34.5)
MCHC RBC AUTO-ENTMCNC: 30.8 % (ref 32–34.5)
MCV RBC AUTO: 88 FL (ref 80–99.9)
MCV RBC AUTO: 92.9 FL (ref 80–99.9)
METER GLUCOSE: 102 MG/DL (ref 74–99)
MONOCYTES ABSOLUTE: 0.54 E9/L (ref 0.1–0.95)
MONOCYTES ABSOLUTE: 0.93 E9/L (ref 0.1–0.95)
MONOCYTES RELATIVE PERCENT: 10.5 % (ref 2–12)
MONOCYTES RELATIVE PERCENT: 7.3 % (ref 2–12)
NEUTROPHILS ABSOLUTE: 5.07 E9/L (ref 1.8–7.3)
NEUTROPHILS ABSOLUTE: 5.97 E9/L (ref 1.8–7.3)
NEUTROPHILS RELATIVE PERCENT: 67.4 % (ref 43–80)
NEUTROPHILS RELATIVE PERCENT: 68.5 % (ref 43–80)
NITRITE, URINE: POSITIVE
NITRITE, URINE: POSITIVE
PDW BLD-RTO: 13.6 FL (ref 11.5–15)
PDW BLD-RTO: 14.7 FL (ref 11.5–15)
PH UA: 6 (ref 5–9)
PH UA: 7 (ref 5–9)
PLATELET # BLD: 267 E9/L (ref 130–450)
PLATELET # BLD: 428 E9/L (ref 130–450)
PMV BLD AUTO: 10 FL (ref 7–12)
PMV BLD AUTO: 9.1 FL (ref 7–12)
POTASSIUM SERPL-SCNC: 4.8 MMOL/L (ref 3.5–5)
POTASSIUM SERPL-SCNC: 4.8 MMOL/L (ref 3.5–5)
PRO-BNP: 1288 PG/ML (ref 0–450)
PROTEIN UA: 100 MG/DL
PROTEIN UA: 100 MG/DL
RBC # BLD: 3.99 E12/L (ref 3.5–5.5)
RBC # BLD: 4.51 E12/L (ref 3.5–5.5)
RBC UA: >20 /HPF (ref 0–2)
RBC UA: ABNORMAL /HPF (ref 0–2)
SARS-COV-2, NAAT: NOT DETECTED
SODIUM BLD-SCNC: 134 MMOL/L (ref 132–146)
SODIUM BLD-SCNC: 138 MMOL/L (ref 132–146)
SPECIFIC GRAVITY UA: 1.01 (ref 1–1.03)
SPECIFIC GRAVITY UA: 1.02 (ref 1–1.03)
T4 TOTAL: 7.6 MCG/DL (ref 4.5–11.7)
TOTAL PROTEIN: 7.1 G/DL (ref 6.4–8.3)
TOTAL PROTEIN: 7.8 G/DL (ref 6.4–8.3)
TRIGL SERPL-MCNC: 155 MG/DL (ref 0–149)
TROPONIN, HIGH SENSITIVITY: 38 NG/L (ref 0–9)
TROPONIN, HIGH SENSITIVITY: 41 NG/L (ref 0–9)
TSH SERPL DL<=0.05 MIU/L-ACNC: 1.87 UIU/ML (ref 0.27–4.2)
TSH SERPL DL<=0.05 MIU/L-ACNC: 2.28 UIU/ML (ref 0.27–4.2)
UROBILINOGEN, URINE: 0.2 E.U./DL
UROBILINOGEN, URINE: 1 E.U./DL
VITAMIN D 25-HYDROXY: 31 NG/ML (ref 30–100)
VLDLC SERPL CALC-MCNC: 31 MG/DL
WBC # BLD: 7.4 E9/L (ref 4.5–11.5)
WBC # BLD: 8.9 E9/L (ref 4.5–11.5)
WBC UA: >20 /HPF (ref 0–5)
WBC UA: ABNORMAL /HPF (ref 0–5)

## 2022-01-01 PROCEDURE — G8417 CALC BMI ABV UP PARAM F/U: HCPCS | Performed by: FAMILY MEDICINE

## 2022-01-01 PROCEDURE — 87040 BLOOD CULTURE FOR BACTERIA: CPT

## 2022-01-01 PROCEDURE — C1894 INTRO/SHEATH, NON-LASER: HCPCS | Performed by: UROLOGY

## 2022-01-01 PROCEDURE — 99214 OFFICE O/P EST MOD 30 MIN: CPT | Performed by: FAMILY MEDICINE

## 2022-01-01 PROCEDURE — 4040F PNEUMOC VAC/ADMIN/RCVD: CPT | Performed by: FAMILY MEDICINE

## 2022-01-01 PROCEDURE — 2700000000 HC OXYGEN THERAPY PER DAY

## 2022-01-01 PROCEDURE — 83880 ASSAY OF NATRIURETIC PEPTIDE: CPT

## 2022-01-01 PROCEDURE — 84443 ASSAY THYROID STIM HORMONE: CPT

## 2022-01-01 PROCEDURE — 96360 HYDRATION IV INFUSION INIT: CPT

## 2022-01-01 PROCEDURE — 82962 GLUCOSE BLOOD TEST: CPT

## 2022-01-01 PROCEDURE — 2720000010 HC SURG SUPPLY STERILE: Performed by: UROLOGY

## 2022-01-01 PROCEDURE — 3700000000 HC ANESTHESIA ATTENDED CARE: Performed by: UROLOGY

## 2022-01-01 PROCEDURE — 2580000003 HC RX 258: Performed by: INTERNAL MEDICINE

## 2022-01-01 PROCEDURE — 85025 COMPLETE CBC W/AUTO DIFF WBC: CPT

## 2022-01-01 PROCEDURE — 84484 ASSAY OF TROPONIN QUANT: CPT

## 2022-01-01 PROCEDURE — C1769 GUIDE WIRE: HCPCS | Performed by: UROLOGY

## 2022-01-01 PROCEDURE — 71045 X-RAY EXAM CHEST 1 VIEW: CPT

## 2022-01-01 PROCEDURE — 3700000001 HC ADD 15 MINUTES (ANESTHESIA): Performed by: UROLOGY

## 2022-01-01 PROCEDURE — 83605 ASSAY OF LACTIC ACID: CPT

## 2022-01-01 PROCEDURE — 6360000004 HC RX CONTRAST MEDICATION: Performed by: RADIOLOGY

## 2022-01-01 PROCEDURE — 36415 COLL VENOUS BLD VENIPUNCTURE: CPT

## 2022-01-01 PROCEDURE — 6360000002 HC RX W HCPCS: Performed by: NURSE PRACTITIONER

## 2022-01-01 PROCEDURE — 96361 HYDRATE IV INFUSION ADD-ON: CPT

## 2022-01-01 PROCEDURE — G8428 CUR MEDS NOT DOCUMENT: HCPCS | Performed by: FAMILY MEDICINE

## 2022-01-01 PROCEDURE — 99285 EMERGENCY DEPT VISIT HI MDM: CPT

## 2022-01-01 PROCEDURE — G8484 FLU IMMUNIZE NO ADMIN: HCPCS | Performed by: FAMILY MEDICINE

## 2022-01-01 PROCEDURE — 80053 COMPREHEN METABOLIC PANEL: CPT

## 2022-01-01 PROCEDURE — 93005 ELECTROCARDIOGRAM TRACING: CPT | Performed by: PHYSICIAN ASSISTANT

## 2022-01-01 PROCEDURE — 88305 TISSUE EXAM BY PATHOLOGIST: CPT

## 2022-01-01 PROCEDURE — 2709999900 HC NON-CHARGEABLE SUPPLY: Performed by: UROLOGY

## 2022-01-01 PROCEDURE — 72125 CT NECK SPINE W/O DYE: CPT

## 2022-01-01 PROCEDURE — 2580000003 HC RX 258: Performed by: NURSE PRACTITIONER

## 2022-01-01 PROCEDURE — 70450 CT HEAD/BRAIN W/O DYE: CPT

## 2022-01-01 PROCEDURE — 71275 CT ANGIOGRAPHY CHEST: CPT

## 2022-01-01 PROCEDURE — 83735 ASSAY OF MAGNESIUM: CPT

## 2022-01-01 PROCEDURE — 3600000013 HC SURGERY LEVEL 3 ADDTL 15MIN: Performed by: UROLOGY

## 2022-01-01 PROCEDURE — 6360000002 HC RX W HCPCS: Performed by: INTERNAL MEDICINE

## 2022-01-01 PROCEDURE — 1036F TOBACCO NON-USER: CPT | Performed by: FAMILY MEDICINE

## 2022-01-01 PROCEDURE — 81001 URINALYSIS AUTO W/SCOPE: CPT

## 2022-01-01 PROCEDURE — 74177 CT ABD & PELVIS W/CONTRAST: CPT

## 2022-01-01 PROCEDURE — 2140000000 HC CCU INTERMEDIATE R&B

## 2022-01-01 PROCEDURE — 7100000011 HC PHASE II RECOVERY - ADDTL 15 MIN: Performed by: UROLOGY

## 2022-01-01 PROCEDURE — 6370000000 HC RX 637 (ALT 250 FOR IP)

## 2022-01-01 PROCEDURE — 6370000000 HC RX 637 (ALT 250 FOR IP): Performed by: INTERNAL MEDICINE

## 2022-01-01 PROCEDURE — 7100000010 HC PHASE II RECOVERY - FIRST 15 MIN: Performed by: UROLOGY

## 2022-01-01 PROCEDURE — 1090F PRES/ABSN URINE INCON ASSESS: CPT | Performed by: FAMILY MEDICINE

## 2022-01-01 PROCEDURE — 88112 CYTOPATH CELL ENHANCE TECH: CPT

## 2022-01-01 PROCEDURE — 1123F ACP DISCUSS/DSCN MKR DOCD: CPT | Performed by: FAMILY MEDICINE

## 2022-01-01 PROCEDURE — 2500000003 HC RX 250 WO HCPCS: Performed by: NURSE ANESTHETIST, CERTIFIED REGISTERED

## 2022-01-01 PROCEDURE — C2617 STENT, NON-COR, TEM W/O DEL: HCPCS | Performed by: UROLOGY

## 2022-01-01 PROCEDURE — 87635 SARS-COV-2 COVID-19 AMP PRB: CPT

## 2022-01-01 PROCEDURE — 3209999900 FLUORO FOR SURGICAL PROCEDURES

## 2022-01-01 PROCEDURE — 2580000003 HC RX 258: Performed by: STUDENT IN AN ORGANIZED HEALTH CARE EDUCATION/TRAINING PROGRAM

## 2022-01-01 PROCEDURE — 6360000002 HC RX W HCPCS: Performed by: NURSE ANESTHETIST, CERTIFIED REGISTERED

## 2022-01-01 PROCEDURE — 3600000003 HC SURGERY LEVEL 3 BASE: Performed by: UROLOGY

## 2022-01-01 DEVICE — URETERAL STENT
Type: IMPLANTABLE DEVICE | Status: FUNCTIONAL
Brand: PERCUFLEX™

## 2022-01-01 RX ORDER — SODIUM CHLORIDE 9 MG/ML
25 INJECTION, SOLUTION INTRAVENOUS PRN
Status: DISCONTINUED | OUTPATIENT
Start: 2022-01-01 | End: 2022-01-01 | Stop reason: HOSPADM

## 2022-01-01 RX ORDER — SODIUM CHLORIDE 0.9 % (FLUSH) 0.9 %
5-40 SYRINGE (ML) INJECTION EVERY 12 HOURS SCHEDULED
Status: DISCONTINUED | OUTPATIENT
Start: 2022-01-01 | End: 2022-01-01 | Stop reason: HOSPADM

## 2022-01-01 RX ORDER — ONDANSETRON 2 MG/ML
4 INJECTION INTRAMUSCULAR; INTRAVENOUS
Status: DISCONTINUED | OUTPATIENT
Start: 2022-01-01 | End: 2022-01-01 | Stop reason: HOSPADM

## 2022-01-01 RX ORDER — SODIUM CHLORIDE 9 MG/ML
INJECTION, SOLUTION INTRAVENOUS CONTINUOUS
Status: DISCONTINUED | OUTPATIENT
Start: 2022-01-01 | End: 2022-01-01 | Stop reason: HOSPADM

## 2022-01-01 RX ORDER — ACETAMINOPHEN 325 MG/1
650 TABLET ORAL EVERY 4 HOURS PRN
Status: DISCONTINUED | OUTPATIENT
Start: 2022-01-01 | End: 2022-01-01 | Stop reason: SDUPTHER

## 2022-01-01 RX ORDER — SODIUM CHLORIDE 9 MG/ML
INJECTION, SOLUTION INTRAVENOUS CONTINUOUS
Status: CANCELLED | OUTPATIENT
Start: 2022-01-01

## 2022-01-01 RX ORDER — ACETAMINOPHEN 325 MG/1
650 TABLET ORAL EVERY 6 HOURS PRN
Status: DISCONTINUED | OUTPATIENT
Start: 2022-01-01 | End: 2022-01-01 | Stop reason: HOSPADM

## 2022-01-01 RX ORDER — SODIUM CHLORIDE 9 MG/ML
INJECTION, SOLUTION INTRAVENOUS PRN
Status: DISCONTINUED | OUTPATIENT
Start: 2022-01-01 | End: 2022-01-01 | Stop reason: HOSPADM

## 2022-01-01 RX ORDER — LATANOPROST 50 UG/ML
1 SOLUTION/ DROPS OPHTHALMIC NIGHTLY
COMMUNITY

## 2022-01-01 RX ORDER — TIMOLOL MALEATE 5 MG/ML
1 SOLUTION/ DROPS OPHTHALMIC 2 TIMES DAILY
Status: DISCONTINUED | OUTPATIENT
Start: 2022-01-01 | End: 2022-01-01 | Stop reason: HOSPADM

## 2022-01-01 RX ORDER — ONDANSETRON 4 MG/1
4 TABLET, ORALLY DISINTEGRATING ORAL EVERY 8 HOURS PRN
Status: DISCONTINUED | OUTPATIENT
Start: 2022-01-01 | End: 2022-01-01 | Stop reason: HOSPADM

## 2022-01-01 RX ORDER — POLYETHYLENE GLYCOL 3350 17 G/17G
17 POWDER, FOR SOLUTION ORAL DAILY PRN
Status: DISCONTINUED | OUTPATIENT
Start: 2022-01-01 | End: 2022-01-01 | Stop reason: HOSPADM

## 2022-01-01 RX ORDER — SODIUM CHLORIDE 0.9 % (FLUSH) 0.9 %
5-40 SYRINGE (ML) INJECTION EVERY 12 HOURS SCHEDULED
Status: CANCELLED | OUTPATIENT
Start: 2022-01-01

## 2022-01-01 RX ORDER — EPHEDRINE SULFATE/0.9% NACL/PF 50 MG/5 ML
SYRINGE (ML) INTRAVENOUS PRN
Status: DISCONTINUED | OUTPATIENT
Start: 2022-01-01 | End: 2022-01-01 | Stop reason: SDUPTHER

## 2022-01-01 RX ORDER — CITALOPRAM 20 MG/1
10 TABLET ORAL EVERY MORNING
Status: DISCONTINUED | OUTPATIENT
Start: 2022-01-01 | End: 2022-01-01 | Stop reason: HOSPADM

## 2022-01-01 RX ORDER — SODIUM CHLORIDE 0.9 % (FLUSH) 0.9 %
5-40 SYRINGE (ML) INJECTION PRN
Status: DISCONTINUED | OUTPATIENT
Start: 2022-01-01 | End: 2022-01-01 | Stop reason: HOSPADM

## 2022-01-01 RX ORDER — FENTANYL CITRATE 50 UG/ML
25 INJECTION, SOLUTION INTRAMUSCULAR; INTRAVENOUS EVERY 5 MIN PRN
Status: DISCONTINUED | OUTPATIENT
Start: 2022-01-01 | End: 2022-01-01 | Stop reason: HOSPADM

## 2022-01-01 RX ORDER — ACETAMINOPHEN 650 MG/1
650 SUPPOSITORY RECTAL EVERY 6 HOURS PRN
Status: DISCONTINUED | OUTPATIENT
Start: 2022-01-01 | End: 2022-01-01 | Stop reason: HOSPADM

## 2022-01-01 RX ORDER — LOSARTAN POTASSIUM 100 MG/1
1 TABLET ORAL DAILY
COMMUNITY
End: 2022-01-01 | Stop reason: SDUPTHER

## 2022-01-01 RX ORDER — SODIUM CHLORIDE 0.9 % (FLUSH) 0.9 %
5-40 SYRINGE (ML) INJECTION PRN
Status: CANCELLED | OUTPATIENT
Start: 2022-01-01

## 2022-01-01 RX ORDER — LOSARTAN POTASSIUM 100 MG/1
100 TABLET ORAL DAILY
Qty: 90 TABLET | Refills: 3 | Status: SHIPPED | OUTPATIENT
Start: 2022-01-01

## 2022-01-01 RX ORDER — CIPROFLOXACIN 2 MG/ML
400 INJECTION, SOLUTION INTRAVENOUS
Status: COMPLETED | OUTPATIENT
Start: 2022-01-01 | End: 2022-01-01

## 2022-01-01 RX ORDER — SIMVASTATIN 20 MG
1 TABLET ORAL
COMMUNITY
End: 2022-01-01 | Stop reason: SDUPTHER

## 2022-01-01 RX ORDER — LATANOPROST 50 UG/ML
1 SOLUTION/ DROPS OPHTHALMIC NIGHTLY
Status: DISCONTINUED | OUTPATIENT
Start: 2022-01-01 | End: 2022-01-01 | Stop reason: HOSPADM

## 2022-01-01 RX ORDER — DORZOLAMIDE HYDROCHLORIDE AND TIMOLOL MALEATE 20; 5 MG/ML; MG/ML
1 SOLUTION/ DROPS OPHTHALMIC 2 TIMES DAILY
Status: DISCONTINUED | OUTPATIENT
Start: 2022-01-01 | End: 2022-01-01 | Stop reason: CLARIF

## 2022-01-01 RX ORDER — ONDANSETRON 2 MG/ML
4 INJECTION INTRAMUSCULAR; INTRAVENOUS EVERY 6 HOURS PRN
Status: DISCONTINUED | OUTPATIENT
Start: 2022-01-01 | End: 2022-01-01 | Stop reason: HOSPADM

## 2022-01-01 RX ORDER — CIPROFLOXACIN 2 MG/ML
400 INJECTION, SOLUTION INTRAVENOUS
Status: CANCELLED | OUTPATIENT
Start: 2022-01-01 | End: 2022-01-01

## 2022-01-01 RX ORDER — SODIUM CHLORIDE 0.9 % (FLUSH) 0.9 %
5-40 SYRINGE (ML) INJECTION EVERY 12 HOURS SCHEDULED
Status: DISCONTINUED | OUTPATIENT
Start: 2022-01-01 | End: 2022-01-01 | Stop reason: SDUPTHER

## 2022-01-01 RX ORDER — SODIUM CHLORIDE 9 MG/ML
25 INJECTION, SOLUTION INTRAVENOUS PRN
Status: CANCELLED | OUTPATIENT
Start: 2022-01-01

## 2022-01-01 RX ORDER — PHENAZOPYRIDINE HYDROCHLORIDE 100 MG/1
100 TABLET, FILM COATED ORAL 3 TIMES DAILY PRN
Status: DISCONTINUED | OUTPATIENT
Start: 2022-01-01 | End: 2022-01-01 | Stop reason: HOSPADM

## 2022-01-01 RX ORDER — 0.9 % SODIUM CHLORIDE 0.9 %
500 INTRAVENOUS SOLUTION INTRAVENOUS ONCE
Status: COMPLETED | OUTPATIENT
Start: 2022-01-01 | End: 2022-01-01

## 2022-01-01 RX ORDER — SIMVASTATIN 20 MG
20 TABLET ORAL DAILY
Qty: 90 TABLET | Refills: 3 | Status: SHIPPED | OUTPATIENT
Start: 2022-01-01

## 2022-01-01 RX ORDER — PHENYLEPHRINE HCL IN 0.9% NACL 1 MG/10 ML
SYRINGE (ML) INTRAVENOUS PRN
Status: DISCONTINUED | OUTPATIENT
Start: 2022-01-01 | End: 2022-01-01 | Stop reason: SDUPTHER

## 2022-01-01 RX ORDER — PHENAZOPYRIDINE HYDROCHLORIDE 100 MG/1
TABLET, FILM COATED ORAL
Status: COMPLETED
Start: 2022-01-01 | End: 2022-01-01

## 2022-01-01 RX ORDER — PROPOFOL 10 MG/ML
INJECTION, EMULSION INTRAVENOUS CONTINUOUS PRN
Status: DISCONTINUED | OUTPATIENT
Start: 2022-01-01 | End: 2022-01-01 | Stop reason: SDUPTHER

## 2022-01-01 RX ORDER — AMLODIPINE BESYLATE 5 MG/1
2.5 TABLET ORAL EVERY MORNING
Status: DISCONTINUED | OUTPATIENT
Start: 2022-01-01 | End: 2022-01-01 | Stop reason: HOSPADM

## 2022-01-01 RX ORDER — DORZOLAMIDE HCL 20 MG/ML
1 SOLUTION/ DROPS OPHTHALMIC 2 TIMES DAILY
Status: DISCONTINUED | OUTPATIENT
Start: 2022-01-01 | End: 2022-01-01 | Stop reason: HOSPADM

## 2022-01-01 RX ORDER — SODIUM CHLORIDE 0.9 % (FLUSH) 0.9 %
5-40 SYRINGE (ML) INJECTION PRN
Status: DISCONTINUED | OUTPATIENT
Start: 2022-01-01 | End: 2022-01-01 | Stop reason: SDUPTHER

## 2022-01-01 RX ORDER — PHENAZOPYRIDINE HYDROCHLORIDE 100 MG/1
100 TABLET, FILM COATED ORAL 3 TIMES DAILY PRN
Qty: 20 TABLET | Refills: 1 | Status: SHIPPED | OUTPATIENT
Start: 2022-01-01 | End: 2022-01-01

## 2022-01-01 RX ORDER — DORZOLAMIDE HYDROCHLORIDE AND TIMOLOL MALEATE 20; 5 MG/ML; MG/ML
1 SOLUTION/ DROPS OPHTHALMIC 2 TIMES DAILY
COMMUNITY

## 2022-01-01 RX ORDER — SODIUM CHLORIDE 9 MG/ML
INJECTION, SOLUTION INTRAVENOUS PRN
Status: DISCONTINUED | OUTPATIENT
Start: 2022-01-01 | End: 2022-01-01 | Stop reason: SDUPTHER

## 2022-01-01 RX ADMIN — SODIUM CHLORIDE: 9 INJECTION, SOLUTION INTRAVENOUS at 06:53

## 2022-01-01 RX ADMIN — Medication 100 MCG: at 07:35

## 2022-01-01 RX ADMIN — SODIUM CHLORIDE, PRESERVATIVE FREE 10 ML: 5 INJECTION INTRAVENOUS at 08:48

## 2022-01-01 RX ADMIN — PHENAZOPYRIDINE HYDROCHLORIDE 100 MG: 100 TABLET, FILM COATED ORAL at 09:01

## 2022-01-01 RX ADMIN — TIMOLOL MALEATE 1 DROP: 5 SOLUTION OPHTHALMIC at 08:56

## 2022-01-01 RX ADMIN — PHENAZOPYRIDINE HYDROCHLORIDE 100 MG: 100 TABLET ORAL at 09:01

## 2022-01-01 RX ADMIN — SODIUM CHLORIDE: 9 INJECTION, SOLUTION INTRAVENOUS at 07:17

## 2022-01-01 RX ADMIN — CITALOPRAM 10 MG: 20 TABLET, FILM COATED ORAL at 08:55

## 2022-01-01 RX ADMIN — SODIUM CHLORIDE 500 ML: 9 INJECTION, SOLUTION INTRAVENOUS at 21:10

## 2022-01-01 RX ADMIN — Medication 10 MG: at 07:38

## 2022-01-01 RX ADMIN — SODIUM CHLORIDE, PRESERVATIVE FREE 10 ML: 5 INJECTION INTRAVENOUS at 23:49

## 2022-01-01 RX ADMIN — CIPROFLOXACIN 400 MG: 2 INJECTION INTRAVENOUS at 07:26

## 2022-01-01 RX ADMIN — PROPOFOL 100 MCG/KG/MIN: 10 INJECTION, EMULSION INTRAVENOUS at 07:29

## 2022-01-01 RX ADMIN — IOPAMIDOL 90 ML: 755 INJECTION, SOLUTION INTRAVENOUS at 20:58

## 2022-01-01 RX ADMIN — AMLODIPINE BESYLATE 2.5 MG: 5 TABLET ORAL at 08:44

## 2022-01-01 RX ADMIN — CEFTRIAXONE 2000 MG: 2 INJECTION, POWDER, FOR SOLUTION INTRAMUSCULAR; INTRAVENOUS at 23:44

## 2022-01-01 ASSESSMENT — PULMONARY FUNCTION TESTS
PIF_VALUE: 0

## 2022-01-01 ASSESSMENT — PATIENT HEALTH QUESTIONNAIRE - PHQ9
SUM OF ALL RESPONSES TO PHQ QUESTIONS 1-9: 0
2. FEELING DOWN, DEPRESSED OR HOPELESS: 0
SUM OF ALL RESPONSES TO PHQ QUESTIONS 1-9: 0
2. FEELING DOWN, DEPRESSED OR HOPELESS: 0
1. LITTLE INTEREST OR PLEASURE IN DOING THINGS: 0
SUM OF ALL RESPONSES TO PHQ QUESTIONS 1-9: 0
SUM OF ALL RESPONSES TO PHQ9 QUESTIONS 1 & 2: 0
SUM OF ALL RESPONSES TO PHQ QUESTIONS 1-9: 0
SUM OF ALL RESPONSES TO PHQ QUESTIONS 1-9: 0
1. LITTLE INTEREST OR PLEASURE IN DOING THINGS: 0
SUM OF ALL RESPONSES TO PHQ9 QUESTIONS 1 & 2: 0
SUM OF ALL RESPONSES TO PHQ QUESTIONS 1-9: 0

## 2022-01-01 ASSESSMENT — ENCOUNTER SYMPTOMS
RESPIRATORY NEGATIVE: 1
EYES NEGATIVE: 1
ALLERGIC/IMMUNOLOGIC NEGATIVE: 1
GASTROINTESTINAL NEGATIVE: 1

## 2022-01-01 ASSESSMENT — PAIN SCALES - GENERAL
PAINLEVEL_OUTOF10: 0

## 2022-01-01 ASSESSMENT — PAIN - FUNCTIONAL ASSESSMENT: PAIN_FUNCTIONAL_ASSESSMENT: 0-10

## 2022-02-21 NOTE — PROGRESS NOTES
22  Name: Mary Ann Leal    : 3/20/1928    Sex: female    Age: 80 y.o. Subjective:  Chief Complaint: Patient is here for 6-month checkup regarding blood pressure cholesterol arthritis see the bladder kidney function liver function weight arthritis    Patient presents today accompanied by her daughter. She is lost 8 pounds last 6 months. Her labs are explained to show an elevated creatinine and elevated liver function studies. She states she was given Flomax and take up to a week ago to slow down her urinary stream and it may have caused some elevated creatinine. Also I see she had a scope done in September and was discharged with ibuprofen. She did take that for that short period of time. Again advised her no anti-inflammatories today. She gets a scope every 4 months she says in the hospital with her neurologist.  She does not eat very well she has a does not drink much fluids. The daughter prepares her meals      Review of Systems   Constitutional: Negative. HENT: Negative. Eyes: Negative. Respiratory: Negative. Cardiovascular: Negative. Gastrointestinal: Negative. Endocrine: Negative. Genitourinary: Negative. Musculoskeletal: Positive for arthralgias. Skin: Negative. Allergic/Immunologic: Negative. Neurological: Negative. Hematological: Negative. Psychiatric/Behavioral: Negative. Current Outpatient Medications:     losartan (COZAAR) 100 MG tablet, Take 1 tablet by mouth daily, Disp: 30 tablet, Rfl: 11    simvastatin (ZOCOR) 20 MG tablet, Take 1 tablet by mouth nightly, Disp: 30 tablet, Rfl: 11    amLODIPine (NORVASC) 2.5 MG tablet, Take 1 tablet by mouth every morning, Disp: 90 tablet, Rfl: 3    citalopram (CELEXA) 10 MG tablet, Take 1 tablet by mouth every morning, Disp: 90 tablet, Rfl: 3    ammonium lactate (LAC-HYDRIN) 12 % lotion, Apply topically daily. , Disp: 1 Bottle, Rfl: 5    Multiple Vitamins-Minerals (ONCOVITE PO), Take by mouth daily, Disp: , Rfl:     Cholecalciferol (VITAMIN D3) 50 MCG ( UT) CAPS, Take 1 capsule by mouth daily, Disp: 90 capsule, Rfl: 3    bimatoprost (LUMIGAN) 0.01 % SOLN ophthalmic drops, Place 1 drop into both eyes nightly, Disp: , Rfl:   Allergies   Allergen Reactions    Enalapril Maleate Other (See Comments)     Other reaction(s): cough/rash    Penicillins      ?  Sulfa Antibiotics      ? Social History     Socioeconomic History    Marital status:      Spouse name: Not on file    Number of children: Not on file    Years of education: Not on file    Highest education level: Not on file   Occupational History    Not on file   Tobacco Use    Smoking status: Never Smoker    Smokeless tobacco: Never Used   Vaping Use    Vaping Use: Never used   Substance and Sexual Activity    Alcohol use: Never    Drug use: Never    Sexual activity: Not Currently   Other Topics Concern    Not on file   Social History Narrative        Stress Test - 09 neg.  (Jaimes)    Duplex Carotid Ultasound - 09 no evidence of significant stenosis    HTN    LIPID    GLAUCOMA    C HYSTER    CVA--    OA L KNEE    DIET DM    SON SHAHZAD HINES AND WILL LIVE WITH HER    Pam Luis  1928 Page #2    LOW VIT B12 AND VIT D 4-10    DAUGHTER WORKS AT DR MEDEIROS OFFICE    GYN DR Isabel Pham    US KIDNEY WITH RIGHT RENAL CYST AND SEVERAL RIGHT RENAL STONES    ADMIT WITH GI BLEED----AND EGD WITH ESOPHAGITIS AND POSS MASS BUT BX NEG---    2-15----DR HORN------RE DONE DR WALLER 5-15    ER WITH FALL WITH 20 % COMP FX L-1 AND CT ABD IWTH LEFT HYDRONEPHROSIS AND    URETER WITH NO STONE------GB STONE OR SLUDGE NOTED    CT -16 WITH DISTAL LEFT URETER MASS---DX WITH URETER CA---- LEFT----DR MAGGI FITZPATRICK-----STENT---bladder scope every 4 omths     SON DX WITH BLADDER CA 12-16    Admit  10-19 with GB sx and new cary murmur   Seen by  Priya panchal and echo with mod AR     Social Determinants of Health Financial Resource Strain:     Difficulty of Paying Living Expenses: Not on file   Food Insecurity:     Worried About Running Out of Food in the Last Year: Not on file    Amy of Food in the Last Year: Not on file   Transportation Needs:     Lack of Transportation (Medical): Not on file    Lack of Transportation (Non-Medical):  Not on file   Physical Activity:     Days of Exercise per Week: Not on file    Minutes of Exercise per Session: Not on file   Stress:     Feeling of Stress : Not on file   Social Connections:     Frequency of Communication with Friends and Family: Not on file    Frequency of Social Gatherings with Friends and Family: Not on file    Attends [employer] Services: Not on file    Active Member of [address] [place] [address] [employer] or Organizations: Not on file    Attends Club or Organization Meetings: Not on file    Marital Status: Not on file   Intimate Partner Violence:     Fear of Current or Ex-Partner: Not on file    Emotionally Abused: Not on file    Physically Abused: Not on file    Sexually Abused: Not on file   Housing Stability:     Unable to Pay for Housing in the Last Year: Not on file    Number of [place] in the Last Year: Not on file    Unstable Housing in the Last Year: Not on file      Past Medical History:   Diagnosis Date    Abdominal pain 1/29/2015    Acute cholecystitis 10/29/2019    Diet-controlled diabetes mellitus ([facility] [address] 8/20/2019    no med    Gastritis 1/30/2015    Glaucoma     Hematemesis 1/29/2015    [place] (hard of hearing)     Hyperlipidemia     Hypertension     PONV (postoperative nausea and vomiting)      Family History   Problem Relation Age of Onset    Early Death Mother     Prostate Cancer Father     Kidney Cancer Sister     Cancer Brother     Alzheimer's Disease Brother     Cancer Sister       Past Surgical History:   Procedure Laterality Date    BLADDER SURGERY Left 9/7/2021    CYSTOURETHROSCOPY, BLADDER WASHING CYTOLOGY, LEFT SEMI RIDGID URETEROSCOPY WITH INTRACOPOREAL LASER ALBATION OF RECURRENT URETERAL TUMOR WITH FULGURATION WITH LEFT STENT CHANGE performed by Rome Pelayo MD at [facility] NEW ACCESS  8/27/2020    [facility] NEW ACCESS 8/27/2020 Edie Opitz, MD [facility] CT    CYSTOSCOPY  04/04/2017    cysto retro left ureteroscopy, stent removal with Dr. Vertell Bosworth  07/18/2017    stent placement    CYSTOSCOPY  10/19/2017    Left stent placement    CYSTOSCOPY N/A 9/22/2020    CYSTO URETHROSCOPY, RETROGRADE PYELOGRAM, BLADDER TUMOR RESECTION AND FULGURATION, POSSIBLE LEFT URETEROSCOPY AND LASER TUMOR ABLATION,  POSSIBLE LEFT STENT INSERTION performed by Rome Pelayo MD at [address] [facility] Bilateral 1/5/2021    CYSTOSCOPY, BILATERAL RETROGRADE PYELOGRAM,WITH BIOPSY,  LEFT URETEROSCOPY, LASER ABLATION OF URETERAL TUMOR WITH LEFT STENT CHANGE performed by Rome Pelayo MD at [address] [facility] / [address] [facility] 8/20/2020    CYSTOSCOPY, TRANSURETHRAL RESECTION BLADDER TUMOR performed by Rome Pelayo MD at [address] [facility] LITHOTRIPSY Left 4/29/2021    CYSTOURETHROSCOPY, BLADDER WASHINGS FOR CYTOLOGY, , LEFT SEMI RIGID URETEROSCOPY WITH INTRACORPOREAL LASER ABLATION OF RECURRENT LEFT URETERAL TUMOR AND LEFT STENT CHANGE performed by Rome Pelayo MD at [address] [address] Left 4/17/2018    CYSTOSCOPY RETROGRADE PYELOGRAM BLADDER AND LEFT URETERAL WASHINGS INSERTION LEFT URETERAL STENT LEFT URETEROSCOPY performed by Rome Pelayo MD at [address] [facility]  1/29/15      Vitals:    02/21/22 1335   BP: 138/83   Temp: 97.2 °F (36.2 °C)   TempSrc: Infrared   Weight: 143 lb (64.9 kg)       Objective:    Physical Exam  Vitals reviewed. Constitutional:       Appearance: She is well-developed. HENT:      Head: Normocephalic. Eyes:      Pupils: Pupils are equal, round, and reactive to light. to avoid risk of worsening medical condition      A great deal of time spent reviewing medications, diet, exercise, social issues. Also reviewing the chart before entering the room with patient and finishing charting after leaving patient's room. More than half of that time was spent face to face with the patient in counseling and coordinating care. Follow Up: Return in about 2 months (around 4/21/2022) for Lab Before.      Seen by:  Zeb Ray, DO

## 2022-03-01 NOTE — PLAN OF CARE
CLAUDE/CRISTY Discharge Plan  Informed patient is ready for discharge. Patient’s discharge destination is [facility]  . Patient to be picked up by [facility] 11am  .  Patient/interested person has been counseled for post hospitalization care.  Patient agrees and understands goals and plan. Initial implementation of the patient’s discharge plan has been arranged, including any devices/equipment needed for discharge. Discharge plan communicated to MD, RN, AYAN, CRISTY, CLAUDE, and Receiving Facility/Agency.  SW to remain available through outpatient clinic as additional needs arise.      After Visit Summary - Transition Report Information  Receiving Agency/Facility: [facility]   Receiving Agency/Facility phone number: [phone]  Receiving Agency/Facility address: [address] [facility]   Receiving Agency/Facility city/state: [facility]    Problem: Pain:  Goal: Pain level will decrease  Description: Pain level will decrease  Outcome: Met This Shift  Goal: Control of acute pain  Description: Control of acute pain  Outcome: Met This Shift  Goal: Control of chronic pain  Description: Control of chronic pain  Outcome: Met This Shift     Problem: Daily Care:  Goal: Daily care needs are met  Description: Daily care needs are met  Outcome: Met This Shift

## 2022-03-03 NOTE — PROGRESS NOTES
[address]   PRE-ADMISSION TESTING GENERAL INSTRUCTIONS  [facility] Phone Number: [phone]      GENERAL INSTRUCTIONS:    [x] Antibacterial Soap Shower Night before and/or AM of Surgery  [] Hernán (CHG) wipe instruction sheet and wipes given. [] Hibiclens shower the night before and the morning of surgery. Do not use Hibiclens on your face or head. [x] Do not wear makeup, lotions, powders, deodorant. [x] Nothing by mouth after midnight, including gum, candy, mints, or water. [x] You may brush your teeth, gargle, but do NOT swallow water. [x] No tobacco products, illegal drugs, or alcohol within 24 hours of your surgery. [x] Jewelry or valuables should not be brought to the hospital. All body and/or tongue piercing's must be removed prior to arriving to hospital. ALL hair pins must be removed. [x] Arrange transportation with a responsible adult  to and from the hospital. Arrange for someone to be with you for the remainder of the day and for 24 hours after your procedure due to having had anesthesia. Who will be your  for transportation?___Debbie_______________         Who will be staying with you for 24 hrs after your procedure?_____Debbie_____________  [x] Bring insurance card and photo ID.  [] Bring copy of living will or healthcare power of  papers to be placed in your electronic record. [] Transfusion Bracelet: Please bring with you to hospital, day of surgery  [] CPAP/BI-PAP: Please bring your machine if you are to spend the night in the hospital.     PARKING INSTRUCTIONS:     · [x] ARRIVAL TIME: 0630 on 3/8/22  · [x] Enter into the [employer] [facility] [place] [facility] [place]. Two people may accompany you. Masks are required. · [x] Parking Lot \"I\" is where you will park. It is located on the corner of [facility] and [facility] [facility]. The entrance is on [address] [facility]. · To enter, press the button and the gate will lift.  A free token will be provided to exit the lot.    EDUCATION INSTRUCTIONS:        [] Knee or hip replacement booklet & exercise pamphlets given. [] [address] placed in chart. [] Pre-admission Testing educational folder given  [] Incentive Spirometry,coughing & deep breathing exercises reviewed. [] Medication information sheet(s)   [] Fluoroscopy-Xray used in surgery reviewed with patient. Educational pamphlet placed in chart. [] Pain: Post-op pain is normal and to be expected. You will be asked to rate your pain from 0-10. [] Ask your nurse for your pain medication. [] Joint camp offered. [] Joint replacement booklets given. [] Other:___________________________    MEDICATION INSTRUCTIONS:    [x] Bring a complete list of your medications, please write the last time you took the medicine, give this list to the nurse. [x] Take the following medications the morning of surgery with 1-2 ounces of water: celexa and norvasc. [x] Stop herbal supplements and vitamins 5 days before your surgery. [x] DO NOT take any diabetic medicine the morning of surgery. Follow instructions for insulin the day before surgery. [x] If you are diabetic and your blood sugar is low or you feel symptomatic, you may drink 1-2 ounces of apple juice or take a glucose tablet.            -The morning of your procedure, you may call the pre-op area if you have concerns about your blood sugar [phone]. [x] Use your inhalers the morning of surgery. Bring your emergency inhaler with you day of surgery. [x] Follow physician instructions regarding any blood thinners you may be taking. WHAT TO EXPECT:    [x] The day of surgery you will be greeted and checked in by the [employer].  In addition, you will be registered in the [facility] by a Patient Access Representative. Please bring your photo ID and insurance card.  A nurse will greet you in accordance to the time you are needed in the pre-op area to prepare you for surgery. Please do not be discouraged if you are not greeted in the order you arrive as there are many variables that are involved in patient preparation. Your patience is greatly appreciated as you wait for your nurse. Please bring in items such as: books, magazines, newspapers, electronics, or any other items  to occupy your time in the waiting area. [x]  Delays may occur with surgery and staff will make a sincere effort to keep you informed of delays. If any delays occur with your procedure, we apologize ahead of time for your inconvenience as we recognize the value of your time.

## 2022-03-07 NOTE — PROGRESS NOTES
Patient and daughter notified of time change for tomorrow. Arrival time is now 5:30am. Verbalized understanding.   Anthony Mart RN

## 2022-03-08 NOTE — ANESTHESIA PRE PROCEDURE
Department of Anesthesiology  Preprocedure Note       Name:  Jared Lombardi   Age:  80 y.o.  :  3/20/1928                                          MRN:  [id number]         Date:  3/8/2022      Surgeon: Toñito Ojeda):  Margarita Davis MD    Procedure: Procedure(s):  CYSTOSCOPY URETEROSCOPY BLADDER WASHING FOR CYTOLOGY, BLADDER STONE REMOVAL, LEFT URETEROSCOPY WITH LASER ABLATION OF RECURRENT URETERAL TUMOR AND LEFT STENT CHANGE -(PAT- CALL DAUGHTER)    Medications prior to admission:   Prior to Admission medications    Medication Sig Start Date End Date Taking?  Authorizing Provider   dorzolamide-timolol (COSOPT) 22.3-6.8 MG/ML ophthalmic solution Place 1 drop into both eyes 2 times daily   Yes Historical Provider, MD   latanoprost (XALATAN) 0.005 % ophthalmic solution Place 1 drop into both eyes nightly   Yes Historical Provider, MD   losartan (COZAAR) 100 MG tablet Take 1 tablet by mouth daily 10/18/21  Yes Ganga Lujan DO   simvastatin (ZOCOR) 20 MG tablet Take 1 tablet by mouth nightly 10/18/21  Yes Ganga Lujan DO   amLODIPine (NORVASC) 2.5 MG tablet Take 1 tablet by mouth every morning 21  Yes Ganga Lujan DO   citalopram (CELEXA) 10 MG tablet Take 1 tablet by mouth every morning 21  Yes Ganga Lujan DO   Multiple Vitamins-Minerals (ONCOVITE PO) Take by mouth daily   Yes Historical Provider, MD   Cholecalciferol (VITAMIN D3) 50 MCG (2000) CAPS Take 1 capsule by mouth daily 19  Yes Ganga Lujan DO       Current medications:    Current Facility-Administered Medications   Medication Dose Route Frequency Provider Last Rate Last Admin    0.9 % sodium chloride infusion   IntraVENous Continuous OVIDIO Henderson  mL/hr at 22 0653 New Bag at 22 0653    0.9 % sodium chloride infusion  25 mL IntraVENous PRN OVIDIO Henderson CNP        ciprofloxacin (CIPRO) IVPB 400 mg  400 mg IntraVENous On Call to [address], APRN - CNP  sodium chloride flush 0.9 % injection 5-40 mL  5-40 mL IntraVENous 2 times per day OVIDIO Henderson CNP        sodium chloride flush 0.9 % injection 5-40 mL  5-40 mL IntraVENous PRN OVIDIO Henderson CNP           Allergies: Allergies   Allergen Reactions    Enalapril Maleate Other (See Comments)     Other reaction(s): cough/rash    Penicillins      ?  Sulfa Antibiotics      ?        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    Kidney disease, chronic, stage III (GFR 30-59 ml/min) ([facility]) N18.30    Gross hematuria R31.0    Bladder tumor D49.4    Ureteral tumor D49.59    Abdominal pain R10.9    Hydronephrosis N13.30    Hydroureter N13.4    Lesion of lumbar spine M89.9    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 ([address] C79.49       Past Medical History:        Diagnosis Date    Abdominal pain 1/29/2015    Acute cholecystitis 10/29/2019    Diet-controlled diabetes mellitus ([address] 8/20/2019    no med    Gastritis 1/30/2015    Glaucoma     Hematemesis 1/29/2015    [place] (hard of hearing)     Hyperlipidemia     Hypertension     PONV (postoperative nausea and vomiting)        Past Surgical History:        Procedure Laterality Date    BLADDER SURGERY Left 9/7/2021    CYSTOURETHROSCOPY, BLADDER WASHING CYTOLOGY, LEFT SEMI RIDGID URETEROSCOPY WITH INTRACOPOREAL LASER ALBATION OF RECURRENT URETERAL TUMOR WITH FULGURATION WITH LEFT STENT CHANGE performed by Cait Hooker [facility] at [facility] OR    [facility]  8/27/2020 CT [facility] 8/27/2020 Mary Jane Will MD [facility] CT    CYSTOSCOPY  04/04/2017    cysto retro left ureteroscopy, stent removal with Dr. Elonda Gitelman  07/18/2017    stent placement    CYSTOSCOPY  10/19/2017    Left stent placement    CYSTOSCOPY N/A 9/22/2020    CYSTO URETHROSCOPY, RETROGRADE PYELOGRAM, BLADDER TUMOR RESECTION AND FULGURATION, POSSIBLE LEFT URETEROSCOPY AND LASER TUMOR ABLATION,  POSSIBLE LEFT STENT INSERTION performed by Willow Justice MD at [address] [facility] Bilateral 1/5/2021    CYSTOSCOPY, BILATERAL RETROGRADE PYELOGRAM,WITH BIOPSY,  LEFT URETEROSCOPY, LASER ABLATION OF URETERAL TUMOR WITH LEFT STENT CHANGE performed by Willow Justice MD at [address] [address] [facility] Left 8/20/2020    CYSTOSCOPY, TRANSURETHRAL RESECTION BLADDER TUMOR performed by Willow Justice MD at [address] [facility] LITHOTRIPSY Left 4/29/2021    CYSTOURETHROSCOPY, BLADDER WASHINGS FOR CYTOLOGY, , LEFT SEMI RIGID URETEROSCOPY WITH INTRACORPOREAL LASER ABLATION OF RECURRENT LEFT URETERAL TUMOR AND LEFT STENT CHANGE performed by Willow Justice MD at [address] [place] [address] Left 4/17/2018    CYSTOSCOPY RETROGRADE PYELOGRAM BLADDER AND LEFT URETERAL WASHINGS INSERTION LEFT URETERAL STENT LEFT URETEROSCOPY performed by Willow Justice MD at [place] [address]  1/29/15       Social History:    Social History     Tobacco Use    Smoking status: Never Smoker    Smokeless tobacco: Never Used   Substance Use Topics    Alcohol use: Never                                Counseling given: Not Answered      Vital Signs (Current):   Vitals:    03/03/22 1452 03/08/22 0628   BP:  (!) 164/70   Pulse:  63   Resp:  18   Temp:  36.8 °C (98.2 °F)   TempSrc:  Temporal   SpO2:  93%   Weight: 142 lb (64.4 kg) 142 lb (64.4 kg)   Height: 5' 2\" (1.575 m) 5' 2\" (1.575 m) BP Readings from Last 3 Encounters:   03/08/22 (!) 164/70   02/21/22 138/83   09/07/21 121/63       NPO Status: Time of last liquid consumption: 2100                        Time of last solid consumption: 2100                        Date of last liquid consumption: 03/07/22                        Date of last solid food consumption: 03/07/22    BMI:   Wt Readings from Last 3 Encounters:   03/08/22 142 lb (64.4 kg)   02/21/22 143 lb (64.9 kg)   09/07/21 151 lb (68.5 kg)     Body mass index is 25.97 kg/m². CBC:   Lab Results   Component Value Date    WBC 7.4 02/08/2022    RBC 4.51 02/08/2022    HGB 12.7 02/08/2022    HCT 41.9 02/08/2022    MCV 92.9 02/08/2022    RDW 13.6 02/08/2022     02/08/2022       CMP:   Lab Results   Component Value Date     02/08/2022    K 4.8 02/08/2022    K 3.9 10/31/2019     02/08/2022    CO2 20 02/08/2022    BUN 21 02/08/2022    CREATININE 1.5 02/08/2022    GFRAA 39 02/08/2022    LABGLOM 32 02/08/2022    GLUCOSE 109 02/08/2022    PROT 7.8 02/08/2022    CALCIUM 9.5 02/08/2022    BILITOT 0.5 02/08/2022    ALKPHOS 119 02/08/2022    AST 32 02/08/2022    ALT 9 02/08/2022       POC Tests: No results for input(s): POCGLU, POCNA, POCK, POCCL, POCBUN, POCHEMO, POCHCT in the last 72 hours. Coags:   Lab Results   Component Value Date    PROTIME 11.0 12/21/2020    INR 1.0 12/21/2020    APTT 24.5 01/29/2015       HCG (If Applicable): No results found for: PREGTESTUR, PREGSERUM, HCG, HCGQUANT     ABGs: No results found for: PHART, PO2ART, EZZ7EZQ, KFK1GNM, BEART, J3PZLWUQ     Type & Screen (If Applicable):  No results found for: LABABO, LABRH    Drug/Infectious Status (If Applicable):  No results found for: HIV, HEPCAB    COVID-19 Screening (If Applicable):   Lab Results   Component Value Date    COVID19 Not Detected 04/23/2021           Anesthesia Evaluation  Patient summary reviewed and Nursing notes reviewed   history of anesthetic complications: PONV.   Airway: Mallampati: II  TM distance: >3 FB   Neck ROM: full  Mouth opening: > = 3 FB Dental: normal exam         Pulmonary:Negative Pulmonary ROS breath sounds clear to auscultation                             Cardiovascular:    (+) hypertension:, valvular problems/murmurs:, murmur,         Rhythm: irregular  Rate: normal                    Neuro/Psych:                ROS comment: Onset of dementia, cooperative   GI/Hepatic/Renal:   (+) GERD:,           Endo/Other:    (+) Diabetes, . Abdominal:       Abdomen: soft. Vascular: Other Findings:           Anesthesia Plan      MAC     ASA 3       Induction: intravenous. Anesthetic plan and risks discussed with patient.     Use of blood products discussed with patient whom.                   faisal Marie, APRN - CRNA   3/8/2022

## 2022-03-08 NOTE — OP NOTE
[address] [place] [address].  Urology Post-operative Note    Kerwin Lindo  YOB: 1928  [id number]    Pre-operative Diagnosis: Recurrent bladder cancer, recurrent distal left ureteral tumor    Post-operative Diagnosis: Left distal ureteral stricture    Procedure: Cystourethroscopy, bladder washings for cytology, bladder biopsy and fulguration, left ureteral dilation with an 8/10 coaxial dilator, left semirigid ureteroscopy, left ureteral holmium laser tumor ablation, left JJ ureteral stent insertion    Surgeon: Jeremiah Raymond MD    [facility]    Estimated Blood Loss:   Minimal    Complications: None    Drains: Left 7 [place] by 26 cm JJ ureteral stent    Specimen(s): Bladder tumor and bladder washings for cytology    Clinical Note:   78-year-old female with recurrent tumor in the left distal ureter and in the bladder. She has periodic endoscopies with tumor ablations. This was last performed in September 2021. She presents for the above listed procedure. The risks and benefits of the procedure including but not limited to infection, bleeding, possible need for stent with stent irritation, possible inability to remove all tumor requiring further procedures etc. were discussed in detail and she elected to proceed    Operative Description:   She was taken to the operating room placed on the OR table in supine position. She received IV Cipro preoperatively. Following induction of anesthesia she was repositioned into the dorsolithotomy position. Her external genitalia and abdomen were then prepped and draped sterilely. A  film KUB showed no stones or abnormalities overlying the urinary tract. A 21 [place] cystoscope with a 30 degree lens was inserted per urethra and into the bladder. The bladder was vigorously irrigated and cytologic washings were obtained and sent to pathology for analysis.   Panendoscopic evaluation of the bladder with a 30 degree lens showed no clots, This was done until there is meticulous hemostasis and no obvious macroscopic residual tumor. The ureteroscope was removed keeping the wire in place. A 7 [place] by 26 cm JJ ureteral stent was passed over the wire and into the left renal pelvis. The wire was removed. Fluoroscopy confirmed coiling the stent proximally left renal pelvis and it was visualized to coil distally in the bladder. The cystoscope was reintroduced per urethra and the bladder. The stent was in good position. There was no active bleeding. The bladder was emptied and the cystoscope was removed. She tolerated the procedure well and was taken to the PACU in stable condition. She was discharged home with a prescription for Pyridium as needed. She was Tylenol as needed for pain. She will avoid nephrotoxins and NSAIDs. She will continue trospium 20 mg twice daily. She will undergo a repeat procedure in roughly 3 to 4 months which I discussed with her family postoperatively.       Jessica Cronin MD  3/8/2022  8:44 AM

## 2022-03-08 NOTE — BRIEF OP NOTE
Brief Postoperative Note      Patient: Yvette Mendoza  YOB: 1928  MRN: [id number]    Date of Procedure: 3/8/2022    Pre-Op Diagnosis: URETHRAL CARCINOMA    Post-Op Diagnosis: Same       Procedure(s):  CYSTOSCOPY URETEROSCOPY BLADDER WASHING FOR CYTOLOGY, LEFT URETEROSCOPY WITH LASER ABLATION OF RECURRENT URETERAL TUMOR AND LEFT STENT CHANGE    Surgeon(s):  Nori Goddard MD    Assistant:  * No surgical staff found *    Anesthesia: Monitor Anesthesia Care    Estimated Blood Loss (mL): Minimal    Complications: None    Specimens:   ID Type Source Tests Collected by Time Destination   A : BLADDER URINE FOR CYTOLOGY, NON-GYN Urine Urine, Cystoscopic CYTOLOGY, NON-GYN Nori Goddard MD 3/8/2022 4445    B : BLADDER TUMOR Tissue Tissue SURGICAL PATHOLOGY Nori Goddard MD 3/8/2022 0502        Implants:  Implant Name Type Inv.  Item Serial No.  Lot No. LRB No. Used Action   [facility] PERCFLX FIRM DUROMETER DBL [place] Uribe [id number]  [facility] 7FR [id number] PERCFLX FIRM DUROMETER DBL PGTL TAPR  [employer]-WD [id number] Left 1 Implanted         Drains:   Closed/Suction Drain Right RUQ Bulb 10 [place] (Active)       Findings: Recurrent tumor in the bladder and left distal ureter, left distal ureteral stricture    Electronically signed by Nori Goddard MD on 3/8/2022 at 8:40 AM

## 2022-03-08 NOTE — H&P
HER DAUGHTER AND SON ARE WITH HER TODAY   S/P URETEROSCOPY WAS ON 4/17/18 WHICH SHOWED NO RECURRENT DISEASE   URINE CYTOLOGY ON 4/17/18 WAS NEGATIVE   4/4/17: URINE CYTOLOGY WAS NEGATIVE   URINE CYTOLOGY ON 10/25/16 WAS NEGATIVE   URINE CULTURE ON 4/4/17 GREW ENTEROCOCCUS AND PSEUDOMONAS AND WAS TREATED WITH ANTIBIOTICS WHICH SHE FINISHED   2/22/17: COMPLETED 6 WEEKLY BCG TREATMENTS   + NOCTURIA X1   - HEMATURIA   - PAIN   LASIX RENAL SCAN 11/2/16 WAS NORMAL WITH 49% LEFT AND 51% LEFT RENAL FUNCTION   PATHOLOGY FROM LEFT URETERAL TUMOR BIOPSY ON 10/25/16 WAS POSITIVE FOR LOW GRADE PAPILLARY UROTHELIAL CARCINOMA, NON INVASIVE   CT SCAN ON 9/19/16 SHOWED LEFT HYDRONEPHROSIS AND AN ENHANCING LEFT DISTAL URETERAL TUMOR (NO METS)   BUN AND CREATININE ON 10/30/19 WERE 14 AND 0.8, RESPECTIVELY         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     The problem is on the left side. Her cancer was diagnosed 4 years ago.      She has had laser ablation to treat her transitional cell carcinoma. She has not received radiation therapy. She did not receive chemotherapy for her cancer.      She did not see blood in her urine. She is not having pain in new locations. She does have a good appetite. Her bowels are moving normally. She has not recently had unwanted weight loss.      Her last cysto was 2 years ago.  Her last radiologic test to evaluate the kidneys was 4 years ago.         ALLERGIES: Enalapril Maleate  Sulfa        MEDICATIONS: Oncovite tablet 2 tablet PO BID   Amlodipine Besilate   Losartan Potassium   Lumigan   Rhopressa   Simvastatin   Timolol Maleate   Vitamin D3          PSH: Bladder Instill AntiCA Agent - 2017, 2017, 2017, 2017, 2017, 2017  Cysto Uretero Biopsy Fulgura, Left, W/ LEFT JJ URETERAL STENT INSERTION - 2016  Cysto Uretero W/excise Tumor, Left, W/URETERAL STENT EXCHANGE - 2016  Cystoscopy Insert Stent - 10/19/2017  Cystoscopy Ureteroscopy, Left - 2018 - at [address] [place] - [id number], 2017, Left - 2017  Hysterectomy        NON- PSH: None       PMH: Dysuria - 2017  Malignant neoplasm of bladder, unspecified, S/P INTRAVESICAL BC17--17 - 2017  Malignant neoplasm of unspecified ureter, Left, LOW GRADE, NON-INVASIVE UROTHELIAL CARCINOMA - 2016  Hydroureter, Left - 2016  Cyst of kidney, acquired, Right  Family history of malignant neoplasm of kidney, SON HAS BLADDER CANCER  Urinary Tract Inf, Unspec site        NON- PMH: Dvrtclos of lg int w/o perforation or abscess w/o bleeding  Essential (primary) hypertension  Gastro-esophageal reflux disease without esophagitis  Other cerebrovascular disease  Other fracture of unspecified lumbar vertebra, sequela  Other specified glaucoma  Pure hypercholesterolemia, unspecified  Type 2 diabetes mellitus without complications        FAMILY HISTORY: Bladder Cancer - Son  Heart Disease - Runs in Family  Kidney Cancer - Runs in Family      SOCIAL HISTORY: Marital Status:   Preferred Language: English; Ethnicity: Unknown; Race: White  Current Smoking Status: Patient has never smoked. Does not use smokeless tobacco.  Has never drank. Does not use drugs. Drinks 1 caffeinated drink per day. Has not had a blood transfusion. Patient's occupation is/was RETIRED.      Notes: FOUR CHILDREN      REVIEW OF SYSTEMS:     Constitutional:   Patient denies fever, chills, and weight loss. Eyes:   Patient denies wearing glasses. Ears, Nose, Mouth, Throat:   Patient denies hearing loss. Cardiovascular:   Patient denies chest pains, swollen ankles, and irregular heartbeat. Respiratory:   Patient denies shortness of breath, wheezing, and chronic cough. Gastrointestinal:   Patient denies abdominal pain, nausea/vomiting, and change in bowels.    Genitourinary:   Patient denies incontinence, painful urination, blood in urine, gerard catheter, and suprapubic catheter. Musculoskeletal:   Patient denies using wheelchair, using walker, and using cane. Integumentary/Skin:   Patient denies rash, persistent itching, and skin cancer history. Neurological:   Patient denies numbness, tingling, dizziness, and altered mental status. Hematologic/Lymphatic:   Patient denies swollen glands, abnormal bleeding, and history blood transfusion.      VITAL SIGNS:        Weight 145 lb / 65.77 kg   Height 63 in / 160.02 cm   Pulse 72 /min   Resp. Rate 18 /min   BMI 25.7 kg/m²      MULTI-SYSTEM PHYSICAL EXAMINATION:     Constitutional: Well-nourished. No physical deformities. Normally developed. Good grooming. Neck: Neck symmetrical, not swollen. Normal tracheal position. Respiratory: No labored breathing, no use of accessory muscles.    Cardiovascular: Normal temperature, normal extremity pulses, no swelling, no varicosities. Lymphatic: No enlargement of neck, axillae, groin. Skin: No paleness, no jaundice, no cyanosis. No lesion, no ulcer, no rash. Neurologic / Psychiatric: Oriented to time, oriented to place, oriented to person. No depression, no anxiety, no agitation. Gastrointestinal: No mass, no tenderness, no rigidity, non obese abdomen. Eyes: Normal conjunctivae. Normal eyelids. Ears, Nose, Mouth, and Throat: Left ear no scars, no lesions, no masses. Right ear no scars, no lesions, no masses. Nose no scars, no lesions, no masses. Normal hearing. Normal lips. Musculoskeletal: Normal gait and station of head and neck.         PAST DATA REVIEWED:   Source Of History:  Patient, Family/Caregiver   Lab Test Review:   Creatinine, Blood Urea Nitrogen (BUN)   Records Review:   Pathology Reports, Previous Patient Records   X-Ray Review: C.T.  Abdomen/Pelvis: Reviewed Report.         PROCEDURES: None      ASSESSMENT:       ICD-10 Details   1 :   Malignant neoplasm of unspecified ureter - C66.9 Left, Stable   2   Urinary Tract Inf, Unspec site - N39.0 Improving      PLAN:              Orders  Labs Urine Cytology               Schedule  Return Visit/Planned Activity: Follow Up After Testing   Procedure: Unspecified Date at 3 - [address] [facility]. [place] - [id number] - Cysto Uretero Biopsy Fulgura - [address], left, RETROGRADE PYELOGRAPHY, POSSIBLE JJ URETERAL STENT INSERTION, LEFT KIDNEY WASHINGS FOR CYTOLOGY            The patient and I talked at length about etiologies of urinary tract infection. We discussed bacterial and viral UTIs. We discussed the possible relationship between urinary tract infection and bladder outlet obstruction, neurogenic bladder, urethral stenosis, urethral stricture, meatal stenosis, urinary tract stones, congenital urinary tract abnormalities, acquired urinary tract abnormalities, hydronephrosis, ureteral obstruction, and the development of chronic cystitis. Alternative treatment options were discussed with the patient in detail. All questions were answered     Antibiotics, anti-inflammatories, and urinary analgesics were discussed with the patient.      The patient gives informed consent to proceed with conservative treatment of their urinary tract infection with urinary tract imaging as indicated.     The patient was given instructions to call for abdominal pain, pelvic pain, perirectal pain, nausea, vomiting, diarrhea, fever over 100? ?F, chills, hematuria, dysuria, frequency, urgency, or urge incontinence.         She feels well today. She does not need additional antibiotics at this time.  I recommended she undergo surveillance cystoscopy with left ureteroscopy and possible biopsy with fulguration as she has had done in the past. She may require additional postoperative intravesical BCG if she has recurrence of her tumor in the left distal ureter.

## 2022-03-08 NOTE — ANESTHESIA POSTPROCEDURE EVALUATION
Department of Anesthesiology  Postprocedure Note    Patient: Sloan Martinez  MRN: [id number]  YOB: 1928  Date of evaluation: 3/8/2022  Time:  8:40 AM     Procedure Summary     Date: 03/08/22 Room / Location: [facility] OR  / [facility]    Anesthesia Start: 5896 Anesthesia Stop: 6564    Procedure: CYSTOSCOPY URETEROSCOPY BLADDER WASHING FOR CYTOLOGY, LEFT URETEROSCOPY WITH LASER ABLATION OF RECURRENT URETERAL TUMOR AND LEFT STENT CHANGE (Left ) Diagnosis: (URETHRAL CARCINOMA)    Surgeons: Melvina Davidson MD Responsible Provider: Erica Sanchez MD    Anesthesia Type: MAC ASA Status: 3          Anesthesia Type: MAC    Johnna Phase I: Johnna Score: 10    Johnna Phase II:      Last vitals: Reviewed and per EMR flowsheets.        Anesthesia Post Evaluation    Patient location during evaluation: PACU  Patient participation: complete - patient participated  Level of consciousness: sleepy but conscious  Pain score: 0  Airway patency: patent  Nausea & Vomiting: no nausea and no vomiting  Complications: no  Cardiovascular status: hemodynamically stable  Respiratory status: acceptable  Hydration status: euvolemic    faisal quinonez, APRN - CRNA

## 2022-04-21 PROBLEM — R09.02 HYPOXIA: Status: ACTIVE | Noted: 2022-01-01

## 2022-04-21 PROBLEM — J96.01 ACUTE RESPIRATORY FAILURE WITH HYPOXIA (HCC): Status: ACTIVE | Noted: 2022-01-01

## 2022-04-21 NOTE — ED NOTES
Department of Emergency Medicine  FIRST PROVIDER TRIAGE NOTE             Independent MLP           4/21/22  6:36 PM EDT    Date of Encounter: 4/21/22   MRN: [id number]      HPI: Kathy Rubi is a 80 y.o. female who presents to the ED for Failure To Thrive (more lethargic the past couple of weeks, not eating/ drinking)     Patient is a 80year-old that is presenting with weakness fatigue, poor appetite over the last 3 weeks and family seen a steady decline. When asked questions patient does respond. Patient was found 2 weeks ago on her bathroom floor just sitting up it is unknown whether the patient hit her head or loss consciousness. Patient is not on blood thinners. Patient does have urethral cancer for which she sees Dr. You Gunter and gets the area lasered every 3 months because she is unable to have the lengthy procedure of the urethra to be rerouted. Patient does have slight baseline dementia but is normally alert and oriented x3. She is alert and oriented x3 today but per family is just not herself    ROS: Negative for cp, sob, fever, cough or vomiting. PE: Gen Appearance/Constitutional: alert  HEENT: NC/NT. PERRLA,  Airway patent. Neck: supple     Initial Plan of Care: All treatment areas with department are currently occupied. Plan to order/Initiate the following while awaiting opening in ED: labs, EKG and imaging studies.   Initiate Treatment-Testing, Proceed toTreatment Area When Bed Available for ED Attending/MLP to Continue Care    Electronically signed by Vijay Sandoval PA-C   DD: 4/21/22         Vijay Sandoval PA-C  04/21/22 1185

## 2022-04-22 NOTE — CONSULTS
4/22/2022 7:57 AM  Service: Urology  Group: [facility] (Zana/Aguilar/Magy)    Kathy Rubi  [id number]     Chief Complaint:    Poor oral intake, failure to thrive    History of Present Illness: The patient is a 80 y.o. female patient who presents with decreased oral intake, increased work of breathing, hypoxia, intermittent nausea. Pt has a hx of recurrent bladder cancer, recurrent distal ureteral tumor. She underwent cystourethroscopy, bladder washings for cytology, bladder biopsy and fulguration, left ureteral dilation with an 8/10 coaxial dilator, left semirigid ureteroscopy, left ureteral holmium laser tumor ablation, left JJ ureteral stent insertion 3/8/22. Pathology revealed papillary urothelial carcinoma. Urine culture done 3/30 showed E Coli. She has been on Nitrofurantoin. Of note, pt's son committed suicide 4 weeks ago. Approximately 2 weeks ago, she was found sitting on the bathroom floor. No known injury. Currently the pt is awake and alert. She is voiding comfortably. She has poor oral intake. She has 02 on per nasal cannula. Family is at the bedside.           Past Medical History:   Diagnosis Date    Abdominal pain 1/29/2015    Acute cholecystitis 10/29/2019    Diet-controlled diabetes mellitus ([address] 8/20/2019    no med    Gastritis 1/30/2015    Glaucoma     Hematemesis 1/29/2015    [place] (hard of hearing)     Hyperlipidemia     Hypertension     PONV (postoperative nausea and vomiting)          Past Surgical History:   Procedure Laterality Date    BLADDER SURGERY Left 9/7/2021    CYSTOURETHROSCOPY, BLADDER WASHING CYTOLOGY, LEFT SEMI RIDGID URETEROSCOPY WITH INTRACOPOREAL LASER ALBATION OF RECURRENT URETERAL TUMOR WITH FULGURATION WITH LEFT STENT CHANGE performed by Lucita Johnson MD at [address] [facility] Left 3/8/2022    CYSTOSCOPY URETEROSCOPY BLADDER WASHING FOR CYTOLOGY, LEFT URETEROSCOPY WITH LASER ABLATION OF RECURRENT URETERAL TUMOR AND LEFT STENT CHANGE performed by Robert Tejada MD at [address] [facility] NEW ACCESS  8/27/2020    CT [facility] NEW ACCESS 8/27/2020 Rob Mccoy MD SE CT    CYSTOSCOPY  04/04/2017    cysto retro left ureteroscopy, stent removal with Dr. Holley Chavez  07/18/2017    stent placement    CYSTOSCOPY  10/19/2017    Left stent placement    CYSTOSCOPY N/A 9/22/2020    CYSTO URETHROSCOPY, RETROGRADE PYELOGRAM, BLADDER TUMOR RESECTION AND FULGURATION, POSSIBLE LEFT URETEROSCOPY AND LASER TUMOR ABLATION,  POSSIBLE LEFT STENT INSERTION performed by Robert Tejada MD at [address] Bilateral 1/5/2021    CYSTOSCOPY, BILATERAL RETROGRADE PYELOGRAM,WITH BIOPSY,  LEFT URETEROSCOPY, LASER ABLATION OF URETERAL TUMOR WITH LEFT STENT CHANGE performed by Robert Tejada MD at [address] / [facility] / Kelly Gaston Left 8/20/2020    CYSTOSCOPY, TRANSURETHRAL RESECTION BLADDER TUMOR performed by Robert Tejada MD at [address] [facility] LITHOTRIPSY Left 4/29/2021    CYSTOURETHROSCOPY, BLADDER WASHINGS FOR CYTOLOGY, , LEFT SEMI RIGID URETEROSCOPY WITH INTRACORPOREAL LASER ABLATION OF RECURRENT LEFT URETERAL TUMOR AND LEFT STENT CHANGE performed by Robert Tejada MD at [address] [address] Left 4/17/2018    CYSTOSCOPY RETROGRADE PYELOGRAM BLADDER AND LEFT URETERAL WASHINGS INSERTION LEFT URETERAL STENT LEFT URETEROSCOPY performed by Robert Tejada MD at [address] [facility]  1/29/15       Medications Prior to Admission:    Medications Prior to Admission: dorzolamide-timolol (COSOPT) 22.3-6.8 MG/ML ophthalmic solution, Place 1 drop into both eyes 2 times daily  latanoprost (XALATAN) 0.005 % ophthalmic solution, Place 1 drop into both eyes nightly  losartan (COZAAR) 100 MG tablet, Take 1 tablet by mouth daily  simvastatin (ZOCOR) 20 MG tablet, Take 1 tablet by mouth nightly  amLODIPine (NORVASC) 2.5 MG tablet, Take 1 tablet by mouth every morning  citalopram (CELEXA) 10 MG tablet, Take 1 tablet by mouth every morning  Multiple Vitamins-Minerals (ONCOVITE PO), Take by mouth daily  Cholecalciferol (VITAMIN D3) 50 MCG (2000 UT) CAPS, Take 1 capsule by mouth daily    Allergies:    Enalapril maleate, Penicillins, and Sulfa antibiotics    Social History:    reports that she has never smoked. She has never used smokeless tobacco. She reports that she does not drink alcohol and does not use drugs. Family History:   Non-contributory to this Urological problem  family history includes Alzheimer's Disease in her brother; Cancer in her brother and sister; Early Death in her mother; Kidney Cancer in her sister; Prostate Cancer in her father. Review of Systems:  Constitutional: no chills or sweats   Respiratory: negative for cough and hemoptysis  Cardiovascular: negative for chest pain and dyspnea  Gastrointestinal: negative for abdominal pain, diarrhea, nausea and vomiting   Derm: negative for rash and skin lesion(s)  Neurological: negative for seizures and tremors  Musculoskeletal:   Endocrine: negative for diabetic symptoms including polydipsia and polyuria  Psychiatric: poor historian   : As above in the HPI, otherwise negative  All other reviews are negative    Physical Exam:     Vitals:  BP (!) 111/58   Pulse 87   Temp 97.7 °F (36.5 °C) (Oral)   Resp 20   Ht 5' 5\" (1.651 m)   Wt 137 lb (62.1 kg)   SpO2 91%   BMI 22.80 kg/m²     General:  Awake, alert. Converses. Well developed, well nourished, well groomed. No apparent distress. HEENT:  Normocephalic, atraumatic. Pupils equal, round. No scleral icterus. No conjunctival injection. Normal lips, teeth, and gums. No nasal discharge. Neck:  Supple, no masses. Heart:  Regular rate. Lungs:  No audible wheezing. Respirations symmetric and non-labored. Abdomen:  soft, nontender, no cv tenderness.   Extremities:  No clubbing, cyanosis, or edema  Skin: Warm and dry, no open lesions or rashes  Neuro: alert, follows commands  Rectal: deferred  Genitalia: deferred    Labs:     Recent Labs     04/21/22  1838   WBC 8.9   RBC 3.99   HGB 10.8*   HCT 35.1   MCV 88.0   MCH 27.1   MCHC 30.8*   RDW 14.7      MPV 9.1       Results for Cirilo Angel (MRN [id number]) as of 4/22/2022 08:03   Ref. Range 4/21/2022 18:38   Sodium Latest Ref Range: 132 - 146 mmol/L 134   Potassium Latest Ref Range: 3.5 - 5.0 mmol/L 4.8   Chloride Latest Ref Range: 98 - 107 mmol/L 100   CO2 Latest Ref Range: 22 - 29 mmol/L 20 (L)   BUN,BUNPL Latest Ref Range: 6 - 23 mg/dL 27 (H)   Creatinine Latest Ref Range: 0.5 - 1.0 mg/dL 1.7 (H)   Anion Gap Latest Ref Range: 7 - 16 mmol/L 14   GFR Non- Latest Ref Range: >=60 mL/min/1.73 28   GFR  Unknown 34   Magnesium Latest Ref Range: 1.6 - 2.6 mg/dL 2.2   Lactic Acid Latest Ref Range: 0.5 - 2.2 mmol/L 2.3 (H)   GLUCOSE, FASTING,GF Latest Ref Range: 74 - 99 mg/dL 178 (H)   CALCIUM, SERUM, [id number] Latest Ref Range: 8.6 - 10.2 mg/dL 9.4   Total Protein Latest Ref Range: 6.4 - 8.3 g/dL 7.1     Results for Ciirlo Angel (MRN [id number]) as of 4/22/2022 08:03   Ref. Range 4/21/2022 18:38 4/21/2022 21:02   Pro-BNP Latest Ref Range: 0 - 450 pg/mL 1,288 (H)    Troponin, High Sensitivity Latest Ref Range: 0 - 9 ng/L 41 (H) 38 (H)     Results for Cirilo Angel (MRN [id number]) as of 4/22/2022 09:38   Ref.  Range 4/21/2022 22:56   Color, UA Latest Ref Range: Straw/Yellow  Yellow   Clarity, UA Latest Ref Range: Clear  CLOUDY (A)   Glucose, UA Latest Ref Range: Negative mg/dL Negative   Bilirubin, Urine Latest Ref Range: Negative  Negative   Ketones, Urine Latest Ref Range: Negative mg/dL Negative   Specific Gravity, UA Latest Ref Range: 1.005 - 1.030  1.015   Blood, Urine Latest Ref Range: Negative  LARGE (A)   pH, UA Latest Ref Range: 5.0 - 9.0  7.0   Protein, UA Latest Ref Range: Negative mg/dL 100 (A) Urobilinogen, Urine Latest Ref Range: <2.0 E.U./dL 1.0   Nitrite, Urine Latest Ref Range: Negative  POSITIVE (A)   Leukocyte Esterase, Urine Latest Ref Range: Negative  MODERATE (A)   WBC, UA Latest Ref Range: 0 - 5 /HPF PACKED (A)   RBC, UA Latest Ref Range: 0 - 2 /HPF 10-20 (A)   Bacteria, UA Latest Ref Range: None Seen /HPF MANY (A)   Amorphous, UA Unknown MODERATE      Result Notes    Component 3/30/22 1125   Organism Escherichia coli Abnormal     Urine Culture, Routine >100,000 CFU/ml    Resulting Agency [facility]          Susceptibility      Escherichia coli (1)    Antibiotic Interpretation Microscan  Method Status    amoxicillin-clavulanate Sensitive <=^2 mcg/mL BACTERIAL SUSCEPTIBILITY PANEL BY ANGEL     ceFAZolin Sensitive <=^4 mcg/mL BACTERIAL SUSCEPTIBILITY PANEL BY ANGEL     cefepime Sensitive <=^0.12 mcg/mL BACTERIAL SUSCEPTIBILITY PANEL BY ANGEL     cefotaxime Sensitive <=^0.25 mcg/mL BACTERIAL SUSCEPTIBILITY PANEL BY ANGEL     cefOXitin Sensitive <=^4 mcg/mL BACTERIAL SUSCEPTIBILITY PANEL BY ANGEL     cefTAZidime-avibactam Sensitive <=^0.12 mcg/mL BACTERIAL SUSCEPTIBILITY PANEL BY ANGEL     gentamicin Sensitive <=^1 mcg/mL BACTERIAL SUSCEPTIBILITY PANEL BY ANGEL     levofloxacin Resistant >=^8 mcg/mL BACTERIAL SUSCEPTIBILITY PANEL BY ANGEL     meropenem Sensitive <=^0.25 mcg/mL BACTERIAL SUSCEPTIBILITY PANEL BY ANGEL     nitrofurantoin Sensitive <=^16 mcg/mL BACTERIAL SUSCEPTIBILITY PANEL BY ANGEL     piperacillin-tazobactam Sensitive <=^4 mcg/mL BACTERIAL SUSCEPTIBILITY PANEL BY ANGEL     trimethoprim-sulfamethoxazole Sensitive <=^20 mcg/mL BACTERIAL SUSCEPTIBILITY PANEL BY ANGEL                    3/8/22    Diagnosis:   Urinary bladder, biopsy: Papillary urothelial cell carcinoma, low-grade,   partially inverted. Comment:    Intradepartmental consultation is obtained.      EXAMINATION:   CT OF THE ABDOMEN AND PELVIS WITH CONTRAST 4/21/2022 8:37 pm       TECHNIQUE:   CT of the abdomen and pelvis was performed with the administration of   intravenous contrast. Multiplanar reformatted images are provided for review. Dose modulation, iterative reconstruction, and/or weight based adjustment of   the mA/kV was utilized to reduce the radiation dose to as low as reasonably   achievable.       COMPARISON:   August 27 2020       HISTORY:   ORDERING SYSTEM PROVIDED HISTORY: hx nephroliathiasis, has a stent   TECHNOLOGIST PROVIDED HISTORY:   Reason for exam:->hx nephroliathiasis, has a stent   Additional Contrast?->None   Decision Support Exception - unselect if not a suspected or confirmed   emergency medical condition->Emergency Medical Condition (MA)   What reading provider will be dictating this exam?->CRC       FINDINGS:   Enlarged appearance of the left kidney.  Heterogeneous enhancement involving   left kidney with significant thickening of proximal left renal collecting   system.  There is a and left-sided ureteral stent. Alla Hoguet seen at level of   left renal sinus.  Thickened appearance of left ureter.  Fat stranding seen   adjacent to left aspect of the urinary bladder.  Stable cyst associated with   the right kidney.  New left adrenal nodule measures 2.4 x 1.8 cm.  Enlarged   left periaortic lymph node measures 1.7 x 1 cm.  No obvious renal vein   thrombosis.  Multiple hypoattenuating masses associated with the liver   measure up to 3 cm.  Numerous gallstones are present.  Pancreas is   unremarkable.  Spleen is unremarkable.  Nodule located in left upper abdomen   likely represents a splenule.  Numerous diverticula throughout the colon. The appendix is unremarkable.  Compression fracture involving L1 with   retropulsion is stable compared to prior from August 24, 2020.  No evidence   of lumbar spine metastases.           Impression   1. Heterogeneous and enlarged appearance of the left kidney likely related to   infiltrative neoplasm versus acute pyelonephritis.    2. Thickened appearance of proximal left renal collecting system related to   pyonephrosis or urothelial neoplasm. 3. Inflammatory changes at level of left renal hilum.  Left ureteral stent is   present. 4. Fat stranding and wall thickening involving left aspect of urinary bladder   related to cystitis or urothelial neoplasm.  Clinical correlation recommended. 5. Multiple hypoattenuating lesions associated with the liver concerning for   hepatic metastases. 6. Enlarged left retroperitoneal lymph node concerning for metastatic   lymphadenopathy. 7. Numerous metastatic lesions in visualized lung bases. 8. New nodule associated with left adrenal gland concerning for adrenal   metastases. EXAMINATION:   CTA OF THE CHEST 4/21/2022 8:37 pm       TECHNIQUE:   CTA of the chest was performed after the administration of intravenous   contrast.  Multiplanar reformatted images are provided for review.  MIP   images are provided for review. Dose modulation, iterative reconstruction,   and/or weight based adjustment of the mA/kV was utilized to reduce the   radiation dose to as low as reasonably achievable.       COMPARISON:   None.       HISTORY:   ORDERING SYSTEM PROVIDED HISTORY: hypoxia   TECHNOLOGIST PROVIDED HISTORY:   Reason for exam:->hypoxia   Decision Support Exception - unselect if not a suspected or confirmed   emergency medical condition->Emergency Medical Condition (MA)   What reading provider will be dictating this exam?->CRC       FINDINGS:   No filling defect in the pulmonary arteries to suggest pulmonary arterial   embolism.  The heart is normal in size.  No pericardial effusion. Atherosclerotic calcifications are associated with the coronary arteries.    There are multiple enlarged mediastinal lymph nodes notable in anterior   paratracheal location measuring up to 1.6 by 1 cm as well as enlarged lymph   nodes in lower paratracheal location and right and left hilum.  Numerous   nodules and masses throughout both lungs.  Largest mass on the right is   located in right lower lobe measuring up to 5 cm.  Largest in left lung is in   left lower lobe measuring up to 5.8 cm.  There is no pneumothorax.  No   airspace opacity or pleural effusion.  Moderate hiatal hernia.  View of the   upper abdomen shows a hypoattenuating lesion associated with dome of the   liver measuring 11 x 10 mm.  Spleen appears normal in size.  No lytic or   blastic bone lesion involving the thoracic spine.  Heterogeneous mass like   area of attenuation associated with left kidney.  Multiple calcified   gallstones.           Impression   1. No evidence of pulmonary embolism. 2. Numerous nodules and masses throughout both lungs suggesting lung   metastases. 3. Mediastinal and bilateral perihilar lymphadenopathy. 4. Partial visualization of a mass at level of the left kidney which could   indicate renal cell carcinoma.  CT abdomen pelvis recommended. 5. Cholelithiasis. 6. Hypoattenuating lesion associated with dome of the liver.  Continued   follow-up recommended. Assessment/plan:    80year old female with history of pt has a hx of recurrent bladder cancer, recurrent distal ureteral tumor. She appears to have diffuse metastatic disease.   UTI in the setting of stent  Advanced age  Antibiotics per primary  Family is present  They have told me that hospice has been consulted and they are taking her home  Their primary focus is comfort  No further  intervention is planned  Continue stent    Onofre Dallas NP-C  [facility]              Electronically signed by OVIDIO Villanueva CNP on 4/22/2022 at 7:57 AM

## 2022-04-22 NOTE — ED PROVIDER NOTES
Department of Emergency Medicine   ED  Provider Note  Admit Date/RoomTime: 4/21/2022  7:50 PM  ED Room: 17A/17A-17          History of Present Illness:  4/21/22, Time: 10:17 PM EDT  Chief Complaint   Patient presents with    Failure To Thrive     more lethargic the past couple of weeks, not eating/ drinking       Koko Goldstein is a 80 y.o. female presenting to the ED for decreased intake. Patient presents with her son and ex daughter-in-law. For the past 1-1/2 to 2 weeks the patient has not been eating or drinking. The patient does have past medical history of ureteral cancer and received cystoscopy with laser treatment but no chemotherapy. Patient seems to have had pain of the left flank since she had a stent placed a few months ago. She is unable to characterize this. She denies any pain to myself. Her urine is malodorous. No evidence of hematuria. Family also felt that the patient has increased work of breathing. This has been present for the past week and half to 2 weeks. She is not on oxygen at home. No associated chest pain. She does have a cough that is nonproductive. No fevers. She is intermittently nauseous but has not had any vomiting. No diarrhea and having bowel movements. Of note, the patient's son committed suicide 4 weeks ago and they are unsure if this is weighing on the patient. Patient denies suicidal or homicidal ideation. Approximately 2 weeks ago, she was found sitting on the floor in the bathroom. Is unclear if there was any trauma. They do not believe she hit her head. Patient denies any vision change, numbness, tingling or weakness. Family does feel that that since that time she has been less conversational.  She does have history of dementia.     Review of Systems:   Constitutional symptoms: Denies fever  Eyes: Denies eye pain  Ears, Nose, Mouth, Throat: Denies ear pain  Cardiovascular: Denies chest pain  Respiratory: Positive for increased work of breathing, positive for cough  Gastrointestinal: Denies blood per rectum  Genitourinary: Positive for left flank pain, positive for malodorous urine  Skin: Denies rashes  Neurological: Denies headache. Left conversational  Musculoskeletal: Denies extremity pain    --------------------------------------------- PAST HISTORY ---------------------------------------------  Past Medical History:  has a past medical history of Abdominal pain, Acute cholecystitis, Diet-controlled diabetes mellitus ([facility] [address] Gastritis, Glaucoma, Hematemesis, [place] (hard of hearing), Hyperlipidemia, Hypertension, and PONV (postoperative nausea and vomiting). Past Surgical History:  has a past surgical history that includes Hysterectomy; Upper gastrointestinal endoscopy (1/29/15); Cystocopy (04/04/2017); Cystoscopy (07/18/2017); Cystocopy (10/19/2017); pr cystourethroscopy,ureter catheter (Left, 4/17/2018); CYSTOSCOPY INSERTION / REMOVAL STENT / STONE (Left, 8/20/2020); CT PTC NEW ACCESS (8/27/2020); Cystoscopy (N/A, 9/22/2020); Cystoscopy (Bilateral, 1/5/2021); Lithotripsy (Left, 4/29/2021); Bladder surgery (Left, 9/7/2021); and Bladder surgery (Left, 3/8/2022). Social History:  reports that she has never smoked. She has never used smokeless tobacco. She reports that she does not drink alcohol and does not use drugs. Family History: family history includes Alzheimer's Disease in her brother; Cancer in her brother and sister; Early Death in her mother; Kidney Cancer in her sister; Prostate Cancer in her father. . Unless otherwise noted, family history is non contributory    The patients home medications have been reviewed.     Allergies: Enalapril maleate, Penicillins, and Sulfa antibiotics    I have reviewed the past medical history, past surgical history, social history, and family history    ---------------------------------------------------PHYSICAL EXAM--------------------------------------    General: The patient is conversational and lying comfortably in bed. Head: Normocephalic and atraumatic. Eyes: Sclera non-icteric and no conjunctival injection  ENT: Nasal and oral membranes appear dry  Neck: Trachea midline. No JVD  Respiratory: Lungs clear to auscultation bilaterally. Patient speaking in full sentences. Patient is on 2 L nasal cannula  Cardiovascular: Regular rate. No pedal edema. Gastrointestinal:  Abdomen is soft and nondistended. Bowel sounds present. There is no tenderness. There is no guarding or rebound. Genitourinary: No CVA tenderness  Musculoskeletal: No deformity. No tenderness of the midline spine. No step-offs or deformities. Skin: Skin warm and dry. No rashes. Neurologic: No gross motor deficits. No aphasia. Alert and oriented to person and place. Patient believed it was 2021. Symmetric movement of the extremities. Mild tremor with finger-nose testing. Pupils equal reactive to light. Extraocular eye movements intact. Sensation intact. Psychiatric: Not responding to internal stimuli    -------------------------------------------------- RESULTS -------------------------------------------------  I have personally reviewed all laboratory and imaging results for this patient. Results are listed below.      LABS: (Lab results interpreted by me)  Results for orders placed or performed during the hospital encounter of 04/21/22   COVID-19, Rapid    Specimen: Nasopharyngeal Swab   Result Value Ref Range    SARS-CoV-2, NAAT Not Detected Not Detected   Comprehensive Metabolic Panel   Result Value Ref Range    Sodium 134 132 - 146 mmol/L    Potassium 4.8 3.5 - 5.0 mmol/L    Chloride 100 98 - 107 mmol/L    CO2 20 (L) 22 - 29 mmol/L    Anion Gap 14 7 - 16 mmol/L    Glucose 178 (H) 74 - 99 mg/dL    BUN 27 (H) 6 - 23 mg/dL    CREATININE 1.7 (H) 0.5 - 1.0 mg/dL    GFR Non-African American 28 >=60 mL/min/1.73    GFR African American 34     Calcium 9.4 8.6 - 10.2 mg/dL    Total Protein 7.1 6.4 - 8.3 g/dL    Albumin 3.3 (L) 3.5 - 5.2 g/dL    Total Bilirubin 0.2 0.0 - 1.2 mg/dL    Alkaline Phosphatase 149 (H) 35 - 104 U/L    ALT 17 0 - 32 U/L    AST 25 0 - 31 U/L   CBC with Auto Differential   Result Value Ref Range    WBC 8.9 4.5 - 11.5 E9/L    RBC 3.99 3.50 - 5.50 E12/L    Hemoglobin 10.8 (L) 11.5 - 15.5 g/dL    Hematocrit 35.1 34.0 - 48.0 %    MCV 88.0 80.0 - 99.9 fL    MCH 27.1 26.0 - 35.0 pg    MCHC 30.8 (L) 32.0 - 34.5 %    RDW 14.7 11.5 - 15.0 fL    Platelets 564 843 - 759 E9/L    MPV 9.1 7.0 - 12.0 fL    Neutrophils % 67.4 43.0 - 80.0 %    Immature Granulocytes % 0.5 0.0 - 5.0 %    Lymphocytes % 19.7 (L) 20.0 - 42.0 %    Monocytes % 10.5 2.0 - 12.0 %    Eosinophils % 1.2 0.0 - 6.0 %    Basophils % 0.7 0.0 - 2.0 %    Neutrophils Absolute 5.97 1.80 - 7.30 E9/L    Immature Granulocytes # 0.04 E9/L    Lymphocytes Absolute 1.74 1.50 - 4.00 E9/L    Monocytes Absolute 0.93 0.10 - 0.95 E9/L    Eosinophils Absolute 0.11 0.05 - 0.50 E9/L    Basophils Absolute 0.06 0.00 - 0.20 E9/L   Lactic Acid   Result Value Ref Range    Lactic Acid 2.3 (H) 0.5 - 2.2 mmol/L   Urinalysis with Microscopic   Result Value Ref Range    Color, UA Yellow Straw/Yellow    Clarity, UA CLOUDY (A) Clear    Glucose, Ur Negative Negative mg/dL    Bilirubin Urine Negative Negative    Ketones, Urine Negative Negative mg/dL    Specific Gravity, UA 1.015 1.005 - 1.030    Blood, Urine LARGE (A) Negative    pH, UA 7.0 5.0 - 9.0    Protein,  (A) Negative mg/dL    Urobilinogen, Urine 1.0 <2.0 E.U./dL    Nitrite, Urine POSITIVE (A) Negative    Leukocyte Esterase, Urine MODERATE (A) Negative    WBC, UA PACKED (A) 0 - 5 /HPF    RBC, UA 10-20 (A) 0 - 2 /HPF    Bacteria, UA MANY (A) None Seen /HPF    Amorphous, UA MODERATE    Troponin   Result Value Ref Range    Troponin, High Sensitivity 41 (H) 0 - 9 ng/L   Brain Natriuretic Peptide   Result Value Ref Range    Pro-BNP 1,288 (H) 0 - 450 pg/mL   Magnesium   Result Value Ref Range    Magnesium 2.2 1.6 - 2.6 mg/dL   TSH   Result Value Ref Range    TSH 1.870 0.270 - 4.200 uIU/mL   Troponin   Result Value Ref Range    Troponin, High Sensitivity 38 (H) 0 - 9 ng/L   ,     RADIOLOGY:  Interpreted by Radiologist unless otherwise specified  XR CHEST PORTABLE   Final Result   Multiple masses and nodules throughout both lungs related to diffuse lung   metastases. Please refer to report from CT chest performed today. CT HEAD WO CONTRAST   Final Result   1. No intracranial hemorrhage or edema. 2. No acute abnormality involving cervical spine. CT CERVICAL SPINE WO CONTRAST   Final Result   1. No intracranial hemorrhage or edema. 2. No acute abnormality involving cervical spine. CTA PULMONARY W CONTRAST   Final Result   1. No evidence of pulmonary embolism. 2. Numerous nodules and masses throughout both lungs suggesting lung   metastases. 3. Mediastinal and bilateral perihilar lymphadenopathy. 4. Partial visualization of a mass at level of the left kidney which could   indicate renal cell carcinoma. CT abdomen pelvis recommended. 5. Cholelithiasis. 6. Hypoattenuating lesion associated with dome of the liver. Continued   follow-up recommended. CT ABDOMEN PELVIS W IV CONTRAST Additional Contrast? None   Final Result   1. Heterogeneous and enlarged appearance of the left kidney likely related to   infiltrative neoplasm versus acute pyelonephritis. 2. Thickened appearance of proximal left renal collecting system related to   pyonephrosis or urothelial neoplasm. 3. Inflammatory changes at level of left renal hilum. Left ureteral stent is   present. 4. Fat stranding and wall thickening involving left aspect of urinary bladder   related to cystitis or urothelial neoplasm. Clinical correlation recommended. 5. Multiple hypoattenuating lesions associated with the liver concerning for   hepatic metastases. 6. Enlarged left retroperitoneal lymph node concerning for metastatic   lymphadenopathy.    7. Numerous metastatic lesions in visualized lung bases. 8. New nodule associated with left adrenal gland concerning for adrenal   metastases. ------------------------- NURSING NOTES AND VITALS REVIEWED ---------------------------   The nursing notes within the ED encounter and vital signs as below have been reviewed by myself  BP (!) 97/50   Pulse 79   Temp 98.2 °F (36.8 °C) (Oral)   Resp 16   SpO2 92%     Oxygen Saturation Interpretation: Abnormal    The patients available past medical records and past encounters were reviewed. ------------------------------ ED COURSE/MEDICAL DECISION MAKING----------------------  Medications   sodium chloride flush 0.9 % injection 5-40 mL (10 mLs IntraVENous Given 4/21/22 2349)   sodium chloride flush 0.9 % injection 5-40 mL (has no administration in time range)   0.9 % sodium chloride infusion (has no administration in time range)   acetaminophen (TYLENOL) tablet 650 mg (has no administration in time range)   ondansetron (ZOFRAN-ODT) disintegrating tablet 4 mg (has no administration in time range)     Or   ondansetron (ZOFRAN) injection 4 mg (has no administration in time range)   0.9 % sodium chloride bolus (0 mLs IntraVENous Stopped 4/21/22 2349)   iopamidol (ISOVUE-370) 76 % injection 90 mL (90 mLs IntraVENous Given 4/21/22 2058)   cefTRIAXone (ROCEPHIN) 2,000 mg in sterile water 20 mL IV syringe (2,000 mg IntraVENous Given 4/21/22 2344)       Medical Decision Making: This is a 80 y.o. female presenting to the ED for altered mental status, shortness of breath, decreased intake. On initial presentation, the patient's vital signs are interpreted as hypertensive, afebrile and hypoxic. Based on history and physical exam, we have a broad differential.  Patient has known history of ureteral cancer. Her acute presentation may be worsening of her chronic disease.   As she is somewhat altered and was found down a few weeks ago I cannot exclude intracranial bedside. They are agreeable to admission as the patient is hypoxic. She is resting comfortably without complaint. Primary care physician admits to Dr. Lauri Patrick who has accepted the admission. Consult to Rohan will also be place routine. Upon my evaluation, this patient had a high probability of imminent or life-threatening deterioration due to hypoxia, which required my direct attention, intervention, and personal management. I have personally provided 31 minutes of critical care time excluding time spent on separately billable procedures. Time includes review of laboratory data, radiology results, discussion with consultants, and monitoring for potential decompensation. Interventions were performed as documented above. This patient's ED course included: a personal history and physicial examination, re-evaluation prior to disposition, IV medications, cardiac monitoring and continuous pulse oximetry    This patient has been closely monitored during their ED course. Consultations:  Medicine and urology    The cardiac monitor revealed sinus rhythm with a heart rate in the 70s as interpreted by me. The cardiac monitor was ordered secondary to the patient's hypoxia and to monitor the patient for dysrhythmia. CPT [id number]    Oxygen Saturation Interpretation: 86 % on room air. Abnormal    Counseling:   I have spoken with the patient and family member son and discussed todays results, in addition to providing specific details for the plan of care and counseling regarding the diagnosis and prognosis. Questions are answered at this time and they are agreeable with the plan.     --------------------------------- IMPRESSION AND DISPOSITION ---------------------------------    IMPRESSION  1. Pyelonephritis    2. [facility]    3. Hypoxia    4.  Elevated troponin        DISPOSITION  Disposition: Admit to telemetry  Patient condition is fair    I, Dr. Trish Marr, am the

## 2022-04-22 NOTE — ED NOTES
Taken to 6503A in stable condition with all personal belongings     Claudell DangerVeter[facility] Affairs [facility] System  04/22/22 3161

## 2022-04-22 NOTE — ED NOTES
Pt came in for fatigue, sleeping more than usual, and poor appetite for the past 3 weeks. Pt says she just isn't quite like her usual self. Pt is AOx3, stable vital signs, on 3L of oxygen NC, family at bedside, call bell within reach, connected to monitor, and IV placed. No further needs at this moment.      Kathy Queen RN  04/21/22 2011

## 2022-04-22 NOTE — H&P
[address]                  [address] [place],  [facility]                              HISTORY AND PHYSICAL    PATIENT NAME: Marjorie Russell              :        1928  MED REC NO:   [id number]                            ROOM:       7819  ACCOUNT NO:   [de-identified]                           ADMIT DATE: 2022  PROVIDER:     Tan Higgins DO    CHIEF COMPLAINT:  Fatigue, weakness. HISTORY OF PRESENT ILLNESS:  The patient is a 79-year-old   female who presented to the emergency room complaining of several-week  history of fatigue, weakness. Diagnostic evaluation in the emergency  room revealed hypoxia. Imaging studies revealed multiple lesions in the  liver, lungs, abdomen. The patient with history of left ureteral CA,  followed by Dr. Oscar De Anda, has ureteral stent. The patient was admitted  for further evaluation and treatment. PAST MEDICAL HISTORY:  Ureteral CA, glaucoma, hyperlipidemia. PAST SURGICAL HISTORY:  Bilateral eye cataract surgery, multiple  urologic procedures with previous ureteral stent, hysterectomy. SOCIAL HISTORY:  No tobacco, no alcohol. REVIEW OF SYSTEMS:  Remarkable for above-stated chief complaint. ALLERGIES:  To SULFA, PENICILLIN, ENALAPRIL. PRIMARY CARE PHYSICIAN:  Wilbert Hayes DO    MEDICATIONS PRIOR TO ADMISSION:  Cosopt, Xalatan, Cozaar, Zocor,  Norvasc, Celexa, multivitamin, vitamin D.    PHYSICAL EXAMINATION:  GENERAL APPEARANCE:  Physical exam reveals a 79-year-old   female who is alert, cooperative and a poor historian. Much of history  obtained from the patient's son who is at the bedside. VITAL SIGNS:  On admission, temperature 98.2, pulse 87, respirations 17,  blood pressure 99/63. HEENT:  Head:  Normocephalic, atraumatic. Eyes:  Pupils equal and  reactive to light. Extraocular muscles intact. Fundi not well  visualized.   Nose:  No obstruction, polyp or discharge noted. Mouth  mucosa without lesion. Pharynx noninjected without exudate. NECK:  Supple. No JVD. No thyromegaly. No carotid bruits. HEART:  Regular rate and rhythm with grade 2/6 systolic murmur. LUNGS:  Clear to auscultation bilaterally. ABDOMEN:  Positive bowel sounds, soft, nontender. No rebound or  guarding. No hepatosplenomegaly. No masses. BACK:  With increased thoracic kyphosis. EXTREMITIES:  With edema in the bilateral lower extremities. LYMPH NODES:  No adenopathy noted. SKIN:  Without rash or lesion. IMPRESSION:  Acute hypoxic respiratory failure, ureteral CA, multiple  lesions in the chest and abdomen consistent with metastatic disease,  hyperlipidemia, glaucoma, hypertension. PLAN:  Admit. After discussion with the patient's son, the patient is a  DNR-CC. We will involve hospice for end-of-life care. Discharge plan  home once arrangements made.         Jackie Hernández DO    D: 04/22/2022 7:53:47       T: 04/22/2022 7:56:01     [email or web address]  Job#: [id number]     Doc#: [id number]    CC:

## 2022-04-22 NOTE — PROGRESS NOTES
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 Hospice care is a service that is covered by most insurance plans. Explained hospice services at home, at [facility] with room/board private pay unless patient has Medicaid and the [facility]. Explained that once in hospice care, all aggressive treatments would be stopped and allow nature to takes its course with focus on comfort care for the patient. Patient assessed. She is alert. Family is receptive for [facility] hospice services in the home, no steps. Dr Bruno Proper to follow. DME ordered for delivery today. Comfort kit ordered from [employer]. Please send discharge instructions per usual.      Discharge Plan:  Discharge Disposition; unknown  HOTV plan:  1. Home via family care. HOTV to admit once at home. 2. Please call HOTV [phone] with any questions. 3. Patient not currently under the care of hospice.     Electronically signed by Pamela Polanco RN on 4/22/2022 at 10:13 AM

## 2022-04-22 NOTE — CARE COORDINATION
Care Coordination:  80year old admitted with weakness, poor appetite and functional decline. History of fall at home 2 weeks ago. Patient with hx of dementia. Dr. Sara Miller met with patient and family this am.  [facility] order in place.  recommended Hospice Care and family agreeable and choiced for HOTV. Referral called . Family will wait at bedside. Their goal is to obtain support for comfort care at home. Son Axel Fernando and daughter Anh Hinojosa ([phone]) . Moreno Marte Patient lives with son Axel Fernando. Two daughters including Anh Hinojosa live with in 1-2 houses. Patient required total care for bathing and dressing. She ambulated short. Distance. She has been declining over last month. They have a  at [address] [place] [address] Drive, [address] but no services other than case management at this time. Has handicapped bathroom and walker. She is currently on 02 but none at home. PCP is Dr. Felix. Uses [facility] in [facility]. SW will follow with plan to discharge home with Hospice services. 1032:  Hospice evaluation complete. Plan is home with Hospice care. Son at bedside and wants to transport home via car. No o2 needed for transport. Dr. Sara Miller phoned and made aware of plan and will process discharge. The Plan for Transition of Care is related to the following treatment goals: home with Hospice    The Patient and/or patient representative son and Darylene Friedlander was provided with a choice of provider and agrees   with the discharge plan. [] Yes [] No    Freedom of choice list was provided with basic dialogue that supports the patient's individualized plan of care/goals, treatment preferences and shares the quality data associated with the providers.  [x] Yes [] No

## 2022-04-22 NOTE — PROGRESS NOTES
AVS reviewed with patient and her family at the bedside. Patient awaiting transport for discharge.  Electronically signed by Jessa Harrell RN on 4/22/22 at 11:37 AM EDT

## 2022-04-22 NOTE — ED NOTES
Radiology Procedure Waiver   Name: Yvette Mendoza  : 3/20/1928  MRN: [id number]    Date:  22    Time: 8:32 PM EDT    Benefits of immediately proceeding with Radiology exam(s) without pre-testing outweigh the risks or are not indicated as specified below and therefore the following is/are being waived:    [] Pregnancy test   [] Patients LMP on-time and regular.   [] Patient had Tubal Ligation or has other Contraception Device. [] Patient  is Menopausal or Premenarcheal.    [] Patient had Full or Partial Hysterectomy. [] Protocol for Iodine allergy    [] MRI Questionnaire     [x] BUN/Creatinine   [] Patient age w/no hx of renal dysfunction. [] Patient on Dialysis. [] Recent Normal Labs.   Electronically signed by Jorge Al MD on 22 at 8:32 PM EDT               Jorge Al MD  22

## 2022-04-22 NOTE — PATIENT CARE CONFERENCE
P Quality Flow/Interdisciplinary Rounds Progress Note        Quality Flow Rounds held on April 22, 2022    Disciplines Attending:  Bedside Nurse, ,  and Nursing Unit [address] was admitted on 4/21/2022  7:50 PM    Anticipated Discharge Date:       Disposition:    Eric Score:  Eric Scale Score: 15    Readmission Risk              Risk of Unplanned Readmission:  15           Discussed patient goal for the day, patient clinical progression, and barriers to discharge.   The following Goal(s) of the Day/Commitment(s) have been identified:  Diagnostics - Report Results      Cintia Ching RN  April 22, 2022

## 2022-04-22 NOTE — ED NOTES
Pt back from CT in stable conditon      [facility]  04/21/22 3012

## 2022-04-22 NOTE — PLAN OF CARE
Problem: Chronic Conditions and Co-morbidities  Goal: Patient's chronic conditions and co-morbidity symptoms are monitored and maintained or improved  Outcome: Completed     Problem: Discharge Planning  Goal: Discharge to home or other facility with appropriate resources  Outcome: Completed     Problem: Skin/Tissue Integrity  Goal: Absence of new skin breakdown  Description: 1. Monitor for areas of redness and/or skin breakdown  2. Assess vascular access sites hourly  3. Every 4-6 hours minimum:  Change oxygen saturation probe site  4. Every 4-6 hours:  If on nasal continuous positive airway pressure, respiratory therapy assess nares and determine need for appliance change or resting period.   Outcome: Completed     Problem: Safety - Adult  Goal: Free from fall injury  Outcome: Completed     Problem: ABCDS Injury Assessment  Goal: Absence of physical injury  Outcome: Completed

## 2022-04-25 NOTE — DISCHARGE SUMMARY
[address]                  [address] [facility],  [facility]                               DISCHARGE SUMMARY    PATIENT NAME: Modesta Duenas              :        1928  MED REC NO:   [id number]                            ROOM:       1607  ACCOUNT NO:   [de-identified]                           ADMIT DATE: 2022  PROVIDER:     Evaristo Jhaveri DO                  DISCHARGE DATE:  2022    DISCHARGE DIAGNOSES:  1. Acute hypoxic respiratory failure. 2.  Ureteral cancer. 3.  Hyperlipidemia. 4.  Glaucoma. 5.  Hypertension. HOSPITAL COURSE:  The patient is a 26-year-old  female who  presented to the emergency room complaining of a several-week history of  fatigue, weakness. Diagnostic evaluation in the emergency room revealed  hypoxia. Imaging studies revealed multiple lesions in the liver, lungs  and abdomen. The patient was admitted to the hospital after discussion  with family. Family's wishes were for the patient to be a DNR-CC. Hospice was consulted and the patient was discharged to home in fair  condition on 2022 with hospice care. DISCHARGE MEDICATIONS:  As per discharge med rec which include,  1. Amlodipine. 2.  Celexa. 3.  Cosopt. 4.  Xalatan. 5.  _____.         Payton Pollard DO    D: 2022 12:09:11       T: 2022 12:11:28     MM/S_BUD_  Job#: [id number]     Doc#: [id number]    CC:

## (undated) DEVICE — FIBER LASER 365 MH 100 W FLEXSHIELD OUTPT TIP ETFE SIL

## (undated) DEVICE — PATIENT RETURN ELECTRODE, SINGLE-USE, CONTACT QUALITY MONITORING, ADULT, WITH 9FT CORD, FOR PATIENTS WEIGING OVER 33LBS. (15KG): Brand: MEGADYNE

## (undated) DEVICE — BAG DRAINAGE CONTAINER 15 LT FLUID COLLCTN

## (undated) DEVICE — Z INACTIVE USE 2660664 SOLUTION IRRIG 3000ML 0.9% SOD CHL USP UROMATIC PLAS CONT

## (undated) DEVICE — SYRINGE,TOOMEY,IRRIGATION,70CC,STERILE: Brand: MEDLINE

## (undated) DEVICE — GOWN,SIRUS,FABRNF,XL,20/CS: Brand: MEDLINE

## (undated) DEVICE — DRAINBAG,ANTI-REFLUX TOWER,L/F,2000ML,LL: Brand: MEDLINE

## (undated) DEVICE — MEDIA CONTRAST ISOVUE  300 10X50ML

## (undated) DEVICE — READY WET SKIN SCRUB TRAY-LF: Brand: MEDLINE INDUSTRIES, INC.

## (undated) DEVICE — BAG DRNGE COMB PK

## (undated) DEVICE — SOLUTION IRRIG 1000ML STRL H2O USP PLAS POUR BTL

## (undated) DEVICE — CYSTO PACK: Brand: MEDLINE INDUSTRIES, INC.

## (undated) DEVICE — GUIDEWIRE ENDOSCP L150CM DIA0.035IN TIP 3CM PTFE NIT

## (undated) DEVICE — SOLUTION IRRIG 3000ML STRL H2O USP UROMATIC PLAS CONT

## (undated) DEVICE — CAMERA STRYKER 1488 HD GEN

## (undated) DEVICE — SOLUTION IV IRRIG WATER 1000ML POUR BRL 2F7114

## (undated) DEVICE — SOLUTION IRRIG 3000ML 0.9% SOD CHL USP UROMATIC PLAS CONT

## (undated) DEVICE — HIGH POWER SINGLE-USE LASER FIBER: Brand: FLEXIVA™ ID

## (undated) DEVICE — Z INACTIVE USE 2635503 SOLUTION IRRIG 3000ML ST H2O USP UROMATIC PLAS CONT

## (undated) DEVICE — SPECIMEN CUP W/LID: Brand: DEROYAL

## (undated) DEVICE — TUR/ENDOSCOPIC CABLE, 10' (3.05 M): Brand: CONMED

## (undated) DEVICE — Z DISCONTINUED USE 2271023 SYRINGE IRRIGATION TOOM 70 CC CATH LUER ADPT TIP PLAS STRL

## (undated) DEVICE — HF-RESECTION ELECTRODE PLASMALOOP LOOP, MEDIUM, 24 FR., 12°/16°, ESG TURIS: Brand: OLYMPUS

## (undated) DEVICE — ELECTRODE ES VPR FOR USA ELITE SYS

## (undated) DEVICE — GLOVE SURG SIGNATURE LTX ESS LTX PF 7.5

## (undated) DEVICE — CATHETER URET 5FR L70CM TIP 8FR OPN END CONE TIP INJ HUB

## (undated) DEVICE — URETERAL STENT
Type: IMPLANTABLE DEVICE | Site: URETER | Status: NON-FUNCTIONAL
Brand: PERCUFLEX™

## (undated) DEVICE — SYRINGE 20ML LL S/C 50

## (undated) DEVICE — CATHETER URETH 22FR BLLN 30CC L16IN SIL 3 W F DISP

## (undated) DEVICE — Device: Brand: OLYMPUS

## (undated) DEVICE — HF-RESECTION ELECTRODE PLASMALOOP LOOP, LARGE, 24 FR., 12°/16°, ESG TURIS: Brand: OLYMPUS

## (undated) DEVICE — GLOVE SURG SZ 75 STD WHT LTX SYN POLYMER BEAD REINF ANTI RL

## (undated) DEVICE — GARMENT,MEDLINE,DVT,INT,CALF,MED, GEN2: Brand: MEDLINE

## (undated) DEVICE — CABLE ENDOSCP L10FT ACT DISP

## (undated) DEVICE — MEDIA CONTRAST RX ISOVUE-300 61% 30ML VIALS

## (undated) DEVICE — DISCONTINUED USE 393900 LIDOCAINE JELLY 2% UROJET PFS25X5ML

## (undated) DEVICE — CYSTO: Brand: MEDLINE INDUSTRIES, INC.

## (undated) DEVICE — DILATOR/SHEATH SET: Brand: 8/10 DILATOR/SHEATH SET

## (undated) DEVICE — CATHETER URET 5FR L70CM OPN END SGL LUMN INJ HUB FLEXIMA

## (undated) DEVICE — PAD,NON-ADHERENT,2X3,STERILE,LF,1/PK: Brand: MEDLINE